# Patient Record
Sex: MALE | Race: WHITE | NOT HISPANIC OR LATINO | Employment: STUDENT | URBAN - METROPOLITAN AREA
[De-identification: names, ages, dates, MRNs, and addresses within clinical notes are randomized per-mention and may not be internally consistent; named-entity substitution may affect disease eponyms.]

---
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
## 2021-01-07 ENCOUNTER — TELEPHONE (OUTPATIENT)
Dept: GASTROENTEROLOGY | Facility: CLINIC | Age: 21
End: 2021-01-07

## 2021-01-07 NOTE — TELEPHONE ENCOUNTER
----- Message from Tong Aly MD sent at 1/4/2021  8:46 PM EST -----  01/04/21  Tell him that his urinalysis came back normal. Let's see what the Urologist says about his kidney stone seen on the CT abd/pelvis. We'll also see what the Gastric emptying study and Kinevac Hida scan show? FAX this to his PCP and the Urologist that he is to see.   Thx. kjh   [facility]  Dr. Tsosie Hodgkins Progress Note     Subjective:   Edna Mendes is a(n) 77year old female.    Current complaints: Patient presents with:  Rheumatoid Arthritis: 3mo f/u, RAPID 3 high severity, labs 7/12/18 CA 8.7, Pt States she has been feeling diazepam 5 mg tablets as a muscle relaxer 3 times a day. Discontinue methotrexate due to liver function abnormalities. Hopefully the Norco with the diazepam can control your condition.   Since you are on Eliquis you cannot take nonsteroidal anti-inflammat

## 2021-01-07 NOTE — TELEPHONE ENCOUNTER
Call from pt.  Advise per DR Aly note.  Verb understanding.     \[facility]\"" has seen urologist and been advised he had a kidney stone.  \[facility]\"" urologist says this is causing him a little pain, but further GI testing should be done.     Pt has GES and HIDA scheduled.     Update to Dr Aly.

## 2021-01-18 ENCOUNTER — HOSPITAL ENCOUNTER (OUTPATIENT)
Dept: NUCLEAR MEDICINE | Facility: HOSPITAL | Age: 21
Discharge: HOME | End: 2021-01-18

## 2021-01-18 DIAGNOSIS — R10.10 PAIN OF UPPER ABDOMEN: ICD-10-CM

## 2021-01-18 DIAGNOSIS — R10.9 ABDOMINAL CRAMPING: ICD-10-CM

## 2021-01-18 PROCEDURE — 0 TECHNETIUM TC 99M MEBROFENIN KIT: Performed by: INTERNAL MEDICINE

## 2021-01-18 PROCEDURE — A9537 TC99M MEBROFENIN: HCPCS | Performed by: INTERNAL MEDICINE

## 2021-01-18 PROCEDURE — 25010000002 SINCALIDE PER 5 MCG: Performed by: INTERNAL MEDICINE

## 2021-01-18 PROCEDURE — 78227 HEPATOBIL SYST IMAGE W/DRUG: CPT

## 2021-01-18 RX ORDER — KIT FOR THE PREPARATION OF TECHNETIUM TC 99M MEBROFENIN 45 MG/10ML
1 INJECTION, POWDER, LYOPHILIZED, FOR SOLUTION INTRAVENOUS
Status: COMPLETED | OUTPATIENT
Start: 2021-01-18 | End: 2021-01-18

## 2021-01-18 RX ADMIN — MEBROFENIN 1 DOSE: 45 INJECTION, POWDER, LYOPHILIZED, FOR SOLUTION INTRAVENOUS at 07:25

## 2021-01-18 RX ADMIN — SINCALIDE 1.2 MCG: 5 INJECTION, POWDER, LYOPHILIZED, FOR SOLUTION INTRAVENOUS at 08:35

## 2021-01-21 ENCOUNTER — TELEPHONE (OUTPATIENT)
Dept: GASTROENTEROLOGY | Facility: CLINIC | Age: 21
End: 2021-01-21

## 2021-01-21 NOTE — TELEPHONE ENCOUNTER
----- Message from Tong Aly MD sent at 1/20/2021  7:25 PM EST -----  01/20/21  Tell him that his Kinevac HIDA scan came back normal with a gallbladder ejection fraction of 69% which is normal.  Was a gastric emptying study done?  Please fax a copy of this report to his PCP.  Kassandra. kjh

## 2021-01-21 NOTE — TELEPHONE ENCOUNTER
Called pt and advised of Dr Aly's note. Verb understanding. Pt did reports that his ges is scheduled for 02/16/2021.  Update sent to Dr Aly.     Results sent to Dr Carballo thru [facility].

## 2021-01-21 NOTE — PROGRESS NOTES
01/20/21  Tell him that his Kinevac HIDA scan came back normal with a gallbladder ejection fraction of 69% which is normal.  Was a gastric emptying study done?  Please fax a copy of this report to his PCP.  Becca olea

## 2021-02-16 ENCOUNTER — HOSPITAL ENCOUNTER (OUTPATIENT)
Dept: NUCLEAR MEDICINE | Facility: HOSPITAL | Age: 21
End: 2021-02-16

## 2021-03-03 ENCOUNTER — HOSPITAL ENCOUNTER (EMERGENCY)
Facility: HOSPITAL | Age: 21
Discharge: HOME | End: 2021-03-03
Attending: EMERGENCY MEDICINE | Admitting: EMERGENCY MEDICINE

## 2021-03-03 ENCOUNTER — APPOINTMENT (OUTPATIENT)
Dept: GENERAL RADIOLOGY | Facility: HOSPITAL | Age: 21
End: 2021-03-03

## 2021-03-03 VITALS
WEIGHT: 135 LBS | HEART RATE: 74 BPM | HEIGHT: 71 IN | SYSTOLIC BLOOD PRESSURE: 127 MMHG | TEMPERATURE: 97.8 F | RESPIRATION RATE: 18 BRPM | OXYGEN SATURATION: 98 % | DIASTOLIC BLOOD PRESSURE: 59 MMHG | BODY MASS INDEX: 18.9 KG/M2

## 2021-03-03 DIAGNOSIS — F41.1 ANXIETY REACTION: Primary | ICD-10-CM

## 2021-03-03 LAB
ALBUMIN SERPL-MCNC: 4.9 G/DL (ref 3.5–5.2)
ALBUMIN/GLOB SERPL: 1.9 G/DL
ALP SERPL-CCNC: 162 U/L (ref 39–117)
ALT SERPL W P-5'-P-CCNC: 14 U/L (ref 1–41)
ANION GAP SERPL CALCULATED.3IONS-SCNC: 12.9 MMOL/L (ref 5–15)
AST SERPL-CCNC: 16 U/L (ref 1–40)
BASOPHILS # BLD AUTO: 0.02 10*3/MM3 (ref 0–0.2)
BASOPHILS NFR BLD AUTO: 0.3 % (ref 0–1.5)
BILIRUB SERPL-MCNC: 0.6 MG/DL (ref 0–1.2)
BUN SERPL-MCNC: 14 MG/DL (ref 6–20)
BUN/CREAT SERPL: 20.9 (ref 7–25)
CALCIUM SPEC-SCNC: 9.6 MG/DL (ref 8.6–10.5)
CHLORIDE SERPL-SCNC: 103 MMOL/L (ref 98–107)
CO2 SERPL-SCNC: 24.1 MMOL/L (ref 22–29)
CREAT SERPL-MCNC: 0.67 MG/DL (ref 0.76–1.27)
DEPRECATED RDW RBC AUTO: 41.6 FL (ref 37–54)
EOSINOPHIL # BLD AUTO: 0.01 10*3/MM3 (ref 0–0.4)
EOSINOPHIL NFR BLD AUTO: 0.1 % (ref 0.3–6.2)
ERYTHROCYTE [DISTWIDTH] IN BLOOD BY AUTOMATED COUNT: 12.9 % (ref 12.3–15.4)
GFR SERPL CREATININE-BSD FRML MDRD: >150 ML/MIN/1.73
GLOBULIN UR ELPH-MCNC: 2.6 GM/DL
GLUCOSE SERPL-MCNC: 93 MG/DL (ref 65–99)
HCT VFR BLD AUTO: 46.4 % (ref 37.5–51)
HGB BLD-MCNC: 16.3 G/DL (ref 13–17.7)
IMM GRANULOCYTES # BLD AUTO: 0.03 10*3/MM3 (ref 0–0.05)
IMM GRANULOCYTES NFR BLD AUTO: 0.4 % (ref 0–0.5)
LIPASE SERPL-CCNC: 27 U/L (ref 13–60)
LYMPHOCYTES # BLD AUTO: 1.94 10*3/MM3 (ref 0.7–3.1)
LYMPHOCYTES NFR BLD AUTO: 28.8 % (ref 19.6–45.3)
MCH RBC QN AUTO: 31.7 PG (ref 26.6–33)
MCHC RBC AUTO-ENTMCNC: 35.1 G/DL (ref 31.5–35.7)
MCV RBC AUTO: 90.3 FL (ref 79–97)
MONOCYTES # BLD AUTO: 0.45 10*3/MM3 (ref 0.1–0.9)
MONOCYTES NFR BLD AUTO: 6.7 % (ref 5–12)
NEUTROPHILS NFR BLD AUTO: 4.29 10*3/MM3 (ref 1.7–7)
NEUTROPHILS NFR BLD AUTO: 63.7 % (ref 42.7–76)
NRBC BLD AUTO-RTO: 0 /100 WBC (ref 0–0.2)
PLATELET # BLD AUTO: 213 10*3/MM3 (ref 140–450)
PMV BLD AUTO: 11.3 FL (ref 6–12)
POTASSIUM SERPL-SCNC: 3.8 MMOL/L (ref 3.5–5.2)
PROT SERPL-MCNC: 7.5 G/DL (ref 6–8.5)
QT INTERVAL: 336 MS
RBC # BLD AUTO: 5.14 10*6/MM3 (ref 4.14–5.8)
SODIUM SERPL-SCNC: 140 MMOL/L (ref 136–145)
TROPONIN T SERPL-MCNC: <0.01 NG/ML (ref 0–0.03)
WBC # BLD AUTO: 6.74 10*3/MM3 (ref 3.4–10.8)

## 2021-03-03 PROCEDURE — 85025 COMPLETE CBC W/AUTO DIFF WBC: CPT | Performed by: NURSE PRACTITIONER

## 2021-03-03 PROCEDURE — 25010000002 LORAZEPAM PER 2 MG: Performed by: NURSE PRACTITIONER

## 2021-03-03 PROCEDURE — 83690 ASSAY OF LIPASE: CPT | Performed by: NURSE PRACTITIONER

## 2021-03-03 PROCEDURE — 84484 ASSAY OF TROPONIN QUANT: CPT | Performed by: NURSE PRACTITIONER

## 2021-03-03 PROCEDURE — 71045 X-RAY EXAM CHEST 1 VIEW: CPT

## 2021-03-03 PROCEDURE — 99284 EMERGENCY DEPT VISIT MOD MDM: CPT

## 2021-03-03 PROCEDURE — 96374 THER/PROPH/DIAG INJ IV PUSH: CPT

## 2021-03-03 PROCEDURE — 96375 TX/PRO/DX INJ NEW DRUG ADDON: CPT

## 2021-03-03 PROCEDURE — 93010 ELECTROCARDIOGRAM REPORT: CPT | Performed by: INTERNAL MEDICINE

## 2021-03-03 PROCEDURE — 80053 COMPREHEN METABOLIC PANEL: CPT | Performed by: NURSE PRACTITIONER

## 2021-03-03 PROCEDURE — 93005 ELECTROCARDIOGRAM TRACING: CPT | Performed by: NURSE PRACTITIONER

## 2021-03-03 PROCEDURE — 25010000002 KETOROLAC TROMETHAMINE PER 15 MG: Performed by: NURSE PRACTITIONER

## 2021-03-03 RX ORDER — SODIUM CHLORIDE 0.9 % (FLUSH) 0.9 %
10 SYRINGE (ML) INJECTION AS NEEDED
Status: DISCONTINUED | OUTPATIENT
Start: 2021-03-03 | End: 2021-03-03 | Stop reason: HOSPADM

## 2021-03-03 RX ORDER — LORAZEPAM 2 MG/ML
1 INJECTION INTRAMUSCULAR ONCE
Status: COMPLETED | OUTPATIENT
Start: 2021-03-03 | End: 2021-03-03

## 2021-03-03 RX ORDER — KETOROLAC TROMETHAMINE 15 MG/ML
10 INJECTION, SOLUTION INTRAMUSCULAR; INTRAVENOUS ONCE
Status: COMPLETED | OUTPATIENT
Start: 2021-03-03 | End: 2021-03-03

## 2021-03-03 RX ADMIN — LORAZEPAM 1 MG: 2 INJECTION INTRAMUSCULAR; INTRAVENOUS at 18:29

## 2021-03-03 RX ADMIN — KETOROLAC TROMETHAMINE 10 MG: 15 INJECTION, SOLUTION INTRAMUSCULAR; INTRAVENOUS at 19:25

## 2021-03-03 RX ADMIN — SODIUM CHLORIDE 500 ML: 9 INJECTION, SOLUTION INTRAVENOUS at 18:27

## 2021-03-03 NOTE — ED PROVIDER NOTES
EMERGENCY DEPARTMENT ENCOUNTER    Room Number:  08/08  Date seen:  3/3/2021  Time seen: 17:55 EST  PCP: Abdullahi Carballo MD  Historian: Patient    HPI:  Chief complaint: Anxiety attack   A complete HPI/ROS/PMH/PSH/SH/FH are unobtainable due to: Not applicable  Context:Wilber Hopkins is a 20 y.o. male who presents to the ED with c/o moderate to severe anxiety attack which occurred prior to arrival right after he got home from [place] today.  He has associated bilateral hand tingling, chest pain described as moderate and stabbing and associated abdominal pain again described as intermittent, mild and stabbing.  He states the symptoms are similar to prior anxiety attack for which he usually takes hydroxyzine 25 mg.  He took hydroxyzine earlier today and states that it did not help today.  He states increased stressors in life including death of an aunt, school stress, and history of being bullied.  He denies any suicidal ideations or homicidal ideations.   He denies any illicit substance abuse or alcohol.  He has had these problems before.    Patient was placed in face mask in first look. Patient was wearing facemask when I entered the room and throughout our encounter. I wore full protective equipment throughout this patient encounter including a face mask, eye shield and gloves. Hand hygiene/washing of hands was performed before donning protective equipment and after removal when leaving the room.    MEDICAL RECORD REVIEW    ALLERGIES  Amoxicillin and Penicillins    PAST MEDICAL HISTORY  Active Ambulatory Problems     Diagnosis Date Noted   • Pain of upper abdomen 07/08/2020   • Diarrhea 07/08/2020   • Constipation 07/08/2020   • Nausea and vomiting 07/08/2020   • Gastroesophageal reflux disease 07/08/2020     Resolved Ambulatory Problems     Diagnosis Date Noted   • No Resolved Ambulatory Problems     Past Medical History:   Diagnosis Date   • Anxiety    • Gastritis    • GERD (gastroesophageal reflux disease)    •  OCD (obsessive compulsive disorder)    • Panic attack    • PTSD (post-traumatic stress disorder)        PAST SURGICAL HISTORY  Past Surgical History:   Procedure Laterality Date   • COLONOSCOPY N/A 7/18/2020    Procedure: COLONOSCOPY to cecum and t.i. with biopsies;  Surgeon: Tong Aly MD;  Location: [facility] ENDOSCOPY;  Service: Gastroenterology;  Laterality: N/A;  pre-  abd pain, n/v, diarrhea  post- normal   • ENDOSCOPY N/A 7/18/2020    Procedure: ESOPHAGOGASTRODUODENOSCOPY with biopsies;  Surgeon: Tong Aly MD;  Location: [facility] ENDOSCOPY;  Service: Gastroenterology;  Laterality: N/A;  pre- abd pain, n/v, diarrhea  post- gastritis       FAMILY HISTORY  No family history on file.    SOCIAL HISTORY  Social History     Socioeconomic History   • Marital status: Single     Spouse name: Not on file   • Number of children: Not on file   • Years of education: Not on file   • Highest education level: Not on file   Tobacco Use   • Smoking status: Never Smoker   • Smokeless tobacco: Never Used   Substance and Sexual Activity   • Alcohol use: No     Frequency: Never   • Drug use: No   • Sexual activity: Defer       REVIEW OF SYSTEMS  Review of Systems    All systems reviewed and negative except for those discussed in HPI.     PHYSICAL EXAM    ED Triage Vitals [03/03/21 1722]   Temp Heart Rate Resp BP SpO2   97.8 °F (36.6 °C) 93 16 -- 95 %      Temp src Heart Rate Source Patient Position BP Location FiO2 (%)   Tympanic Monitor -- -- --     Physical Exam    I have reviewed the triage vital signs and nursing notes.      GENERAL: very anxious, trembling  HENT: nares patent, mm moist  EYES: no scleral icterus  NECK: no ROM limitations  CV: regular rhythm, sinus tachycardia, no murmur, no rubs, no gallups  RESPIRATORY: normal effort, CTAB  ABDOMEN: soft, no focal tenderness, no guarding or rebound  : deferred  MUSCULOSKELETAL: no deformity  NEURO: alert, moves all extremities, follows commands  SKIN: warm, dry    LAB  RESULTS  Recent Results (from the past 24 hour(s))   ECG 12 Lead    Collection Time: 03/03/21  6:20 PM   Result Value Ref Range    QT Interval 336 ms   Comprehensive Metabolic Panel    Collection Time: 03/03/21  6:28 PM    Specimen: Blood   Result Value Ref Range    Glucose 93 65 - 99 mg/dL    BUN 14 6 - 20 mg/dL    Creatinine 0.67 (L) 0.76 - 1.27 mg/dL    Sodium 140 136 - 145 mmol/L    Potassium 3.8 3.5 - 5.2 mmol/L    Chloride 103 98 - 107 mmol/L    CO2 24.1 22.0 - 29.0 mmol/L    Calcium 9.6 8.6 - 10.5 mg/dL    Total Protein 7.5 6.0 - 8.5 g/dL    Albumin 4.90 3.50 - 5.20 g/dL    ALT (SGPT) 14 1 - 41 U/L    AST (SGOT) 16 1 - 40 U/L    Alkaline Phosphatase 162 (H) 39 - 117 U/L    Total Bilirubin 0.6 0.0 - 1.2 mg/dL    eGFR Non African Amer >150 >60 mL/min/1.73    Globulin 2.6 gm/dL    A/G Ratio 1.9 g/dL    BUN/Creatinine Ratio 20.9 7.0 - 25.0    Anion Gap 12.9 5.0 - 15.0 mmol/L   Lipase    Collection Time: 03/03/21  6:28 PM    Specimen: Blood   Result Value Ref Range    Lipase 27 13 - 60 U/L   CBC Auto Differential    Collection Time: 03/03/21  6:28 PM    Specimen: Blood   Result Value Ref Range    WBC 6.74 3.40 - 10.80 10*3/mm3    RBC 5.14 4.14 - 5.80 10*6/mm3    Hemoglobin 16.3 13.0 - 17.7 g/dL    Hematocrit 46.4 37.5 - 51.0 %    MCV 90.3 79.0 - 97.0 fL    MCH 31.7 26.6 - 33.0 pg    MCHC 35.1 31.5 - 35.7 g/dL    RDW 12.9 12.3 - 15.4 %    RDW-SD 41.6 37.0 - 54.0 fl    MPV 11.3 6.0 - 12.0 fL    Platelets 213 140 - 450 10*3/mm3    Neutrophil % 63.7 42.7 - 76.0 %    Lymphocyte % 28.8 19.6 - 45.3 %    Monocyte % 6.7 5.0 - 12.0 %    Eosinophil % 0.1 (L) 0.3 - 6.2 %    Basophil % 0.3 0.0 - 1.5 %    Immature Grans % 0.4 0.0 - 0.5 %    Neutrophils, Absolute 4.29 1.70 - 7.00 10*3/mm3    Lymphocytes, Absolute 1.94 0.70 - 3.10 10*3/mm3    Monocytes, Absolute 0.45 0.10 - 0.90 10*3/mm3    Eosinophils, Absolute 0.01 0.00 - 0.40 10*3/mm3    Basophils, Absolute 0.02 0.00 - 0.20 10*3/mm3    Immature Grans, Absolute 0.03 0.00 - 0.05  10*3/mm3    nRBC 0.0 0.0 - 0.2 /100 WBC   Troponin    Collection Time: 03/03/21  6:28 PM    Specimen: Blood   Result Value Ref Range    Troponin T <0.010 0.000 - 0.030 ng/mL         RADIOLOGY RESULTS  XR Chest 1 View               PROGRESS, DATA ANALYSIS, CONSULTS AND MEDICAL DECISION MAKING  All labs have been independently reviewed by me.  All radiology studies have been reviewed by me and discussed with radiologist dictating the report.  EKG's independently viewed and interpreted by me unless stated otherwise. Discussion below represents my analysis of pertinent findings related to patient's condition, differential diagnosis, treatment plan and final disposition.     ED Course as of Mar 03 2336   Wed Mar 03, 2021   1838 EKG   viewed by ER MD prior to my interpretation       EKG time:1820  Rhythm/Rate:107, sinus tachycardia  P waves and CT: normal CT, normal NEHEMIAH  QRS, axis: normal qRS, normal axis  ST and T waves: borderline T wave abnormalities    Interpreted Contemporaneously by me, independently viewed  No prior available for comparison      [EW]   2007 I viewed chest x-ray on PACS.  My interpretation is no acute abnormalities.    [EW]      ED Course User Index  [EW] Brodie [place], LOLIS     DDX: anxiety reaction, CP, abdominal pain    MDM: labs, EKG and troponin are normal.  His anxiety and tremulousness is improved after Ativan and his chest pain is completely gone after Toradol.  No evidence of ACS, pericarditis, myocarditis, pulmonary embolism, pneumothorax, pneumonia, Zoster, or esophageal perforation. Historically not abrupt in onset, tearing or ripping, pulses symmetric, no evidence of aortic dissection.     Reviewed pt's history and workup with Dr. Jones.  After a bedside evaluation, Dr. Jones agrees with the plan of care.    The patient's history, physical exam, and lab findings were discussed with the physician, who also performed a face to face history and physical exam.  I discussed all  "results and noted any abnormalities with patient.  Discussed absoute need to recheck abnormalities with their family physician.  I answered any of the patient's questions.  Discussed plan for discharge, as there is no emergent indication for admission.  Pt is agreeable and understands need for follow up and repeat testing.  Pt is aware that discharge does not mean that nothing is wrong but it indicates no emergency is present and they must continue care with their family physician.  Pt is discharged with instructions to follow up with primary care doctor to have their blood pressure rechecked.       Disposition vitals:  /59 (BP Location: Left arm, Patient Position: Lying)   Pulse 74   Temp 97.8 °F (36.6 °C) (Tympanic)   Resp 18   Ht 180.3 cm (71\")   Wt 61.2 kg (135 lb)   SpO2 98%   BMI 18.83 kg/m²       DIAGNOSIS  Final diagnoses:   Anxiety reaction       FOLLOW UP   Abdullahi Carballo MD  [address] [id number]  [phone]    Schedule an appointment as soon as possible for a visit in 1 day           Nydia Calloway, LOLIS  03/03/21 [id number]    "

## 2021-03-03 NOTE — ED NOTES
Patient was placed in face mask in first look.  Patient was wearing a face mask throughout our encounter.  I wore protective eye protection throughout the encounter.  Hand hygiene was performed before and after patient encounter.       Patient c/o panic attack, abdominal pain, and chest pain about 30 minutes PTA.  Pt has a Hx of Anxiety.     Galo Carvajal, RN  03/03/21 1472

## 2021-03-04 ENCOUNTER — OFFICE VISIT (OUTPATIENT)
Dept: GASTROENTEROLOGY | Facility: CLINIC | Age: 21
End: 2021-03-04

## 2021-03-04 VITALS — WEIGHT: 132.4 LBS | HEIGHT: 71 IN | TEMPERATURE: 98 F | BODY MASS INDEX: 18.54 KG/M2

## 2021-03-04 DIAGNOSIS — R10.10 PAIN OF UPPER ABDOMEN: Primary | ICD-10-CM

## 2021-03-04 DIAGNOSIS — K21.9 GASTROESOPHAGEAL REFLUX DISEASE WITHOUT ESOPHAGITIS: ICD-10-CM

## 2021-03-04 DIAGNOSIS — R19.8 PAIN ASSOCIATED WITH DEFECATION: ICD-10-CM

## 2021-03-04 PROCEDURE — 99214 OFFICE O/P EST MOD 30 MIN: CPT | Performed by: NURSE PRACTITIONER

## 2021-03-04 NOTE — PROGRESS NOTES
Chief Complaint   Patient presents with   • Follow-up   • Diarrhea     Resolved   • Heartburn   • Constipation     Pain with BMs. OTC meds 0% effective       Wilber Hopkins is a  20 y.o. male here for a follow up visit for abdominal pain.    HPI  20-year-old male presents today for follow-up visit for upper abdominal pain.  He is a patient of Dr. Aly.  He was last seen in the office on 12/30/2020.  Yesterday he was in the [facility] ER for a nasty panic attack.  Cardiac work-up was negative.  He tells me school has been quite stressful lately.  He is currently on spring break.  He has had an extensive GI work-up including unremarkable EGD and colonoscopy last year, H. pylori breath test that was negative, small bowel follow-through that was normal, gallbladder ultrasound which was normal and most recently a HIDA scan which was normal.  Patient has yet to get the gastric emptying study done.  He does have a history of GERD and admits as long as he takes the omeprazole 20 mg daily he does well.  He does admit his anxiety makes all of his symptoms worse.  He is occasionally having to take the bentyl for the upper abdominal pain and cramping.  He admits the Bentyl works well for that.  He still reports several times a week having pain with a bowel movement.  Sometimes his stools can be harder than others.  He does have 1-2 bowel movements every day.  He tells me he drinks plenty of water.  He denies any dysphagia, nausea and vomiting, diarrhea, rectal bleeding or melena.  Admits his appetite is good and his weight is stable.  Past Medical History:   Diagnosis Date   • Anxiety    • Gastritis    • GERD (gastroesophageal reflux disease)    • OCD (obsessive compulsive disorder)    • Panic attack    • PTSD (post-traumatic stress disorder)        Past Surgical History:   Procedure Laterality Date   • COLONOSCOPY N/A 7/18/2020    Procedure: COLONOSCOPY to cecum and t.i. with biopsies;  Surgeon: Tong Aly MD;  Location:   [facility] ENDOSCOPY;  Service: Gastroenterology;  Laterality: N/A;  pre-  abd pain, n/v, diarrhea  post- normal   • ENDOSCOPY N/A 7/18/2020    Procedure: ESOPHAGOGASTRODUODENOSCOPY with biopsies;  Surgeon: Tong Aly MD;  Location: [facility] ENDOSCOPY;  Service: Gastroenterology;  Laterality: N/A;  pre- abd pain, n/v, diarrhea  post- gastritis       Scheduled Meds:    Continuous Infusions:No current facility-administered medications for this visit.       PRN Meds:.    Allergies   Allergen Reactions   • Amoxicillin GI Intolerance   • Penicillins GI Intolerance       Social History     Socioeconomic History   • Marital status: Single     Spouse name: Not on file   • Number of children: Not on file   • Years of education: Not on file   • Highest education level: Not on file   Tobacco Use   • Smoking status: Never Smoker   • Smokeless tobacco: Never Used   Substance and Sexual Activity   • Alcohol use: No     Frequency: Never   • Drug use: No   • Sexual activity: Defer       History reviewed. No pertinent family history.    Review of Systems   Constitutional: Negative for appetite change, chills, diaphoresis, fatigue, fever and unexpected weight change.   HENT: Negative for nosebleeds, postnasal drip, sore throat, trouble swallowing and voice change.    Respiratory: Negative for cough, choking, chest tightness, shortness of breath and wheezing.    Cardiovascular: Negative for chest pain, palpitations and leg swelling.   Gastrointestinal: Negative for abdominal distention, abdominal pain, anal bleeding, blood in stool, constipation, diarrhea, nausea, rectal pain and vomiting.   Endocrine: Negative for polydipsia, polyphagia and polyuria.   Musculoskeletal: Negative for gait problem.   Skin: Negative for rash and wound.   Allergic/Immunologic: Negative for food allergies.   Neurological: Negative for dizziness, speech difficulty and light-headedness.   Psychiatric/Behavioral: Negative for confusion, self-injury, sleep  disturbance and suicidal ideas.       Vitals:    03/04/21 1131   Temp: 98 °F (36.7 °C)       Physical Exam  Constitutional:       General: He is not in acute distress.     Appearance: He is well-developed. He is not ill-appearing.   HENT:      Head: Normocephalic.   Eyes:      Pupils: Pupils are equal, round, and reactive to light.   Cardiovascular:      Rate and Rhythm: Normal rate and regular rhythm.      Heart sounds: Normal heart sounds.   Pulmonary:      Effort: Pulmonary effort is normal.      Breath sounds: Normal breath sounds.   Abdominal:      General: Bowel sounds are normal. There is no distension.      Palpations: Abdomen is soft. There is no mass.      Tenderness: There is no abdominal tenderness. There is no guarding or rebound.      Hernia: No hernia is present.   Musculoskeletal: Normal range of motion.   Skin:     General: Skin is warm and dry.   Neurological:      Mental Status: He is alert and oriented to person, place, and time.   Psychiatric:         Speech: Speech normal.         Behavior: Behavior normal.         Judgment: Judgment normal.         No radiology results for the last 7 days     Assessment and plan    1. Pain of upper abdomen    2. Gastroesophageal reflux disease without esophagitis    3. Pain associated with defecation        Reviewed ER records and imaging records with him today.  HIDA scan was normal.  Small bowel follow-through was negative.  Gallbladder ultrasound was normal.  H. pylori breath test was negative.  Sounds like his anxiety attacks really seem to make all of his GI symptoms much worse.  GERD seems well controlled right now on omeprazole 20 mg daily.  Bentyl seems to help with the abdominal pain and cramping.  He still is reporting pain with having a bowel movement.  I think he needs to increase his water and fiber in his diet.  Patient still needs to get a gastric emptying study done.  He is in a call scheduling today and see about getting on the schedule for  that.  Patient to call the office with any issues.  Patient to follow-up with Dr. Aly as planned.

## 2021-03-04 NOTE — DISCHARGE INSTRUCTIONS
Talk to your Primary Care Provider or Therapist about an antidepressant that may help with your anxiety    Also, you can take 1-2 of the 25 mg Hydroxyzine tablets as needed for anxiety    Although you are being discharged from the ED today, I encourage you to return for worsening symptoms. Things can, and do, change such that treatment at home with medication may not be adequate. Specifically I recommend returning for chest pain or discomfort, difficulty breathing, persistent vomiting or difficulty holding down liquids or medications, fever > 102.0 F, or any other worsening or alarming symptoms.     Rest. Drink plenty of fluids.  Follow up with PCP or provider listed for further evaluation and management.  Follow up with primary care provider for further management and to have blood pressure rechecked.  Take all medications as prescribed.

## 2021-03-18 ENCOUNTER — HOSPITAL ENCOUNTER (EMERGENCY)
Facility: HOSPITAL | Age: 21
Discharge: HOME | End: 2021-03-18
Attending: EMERGENCY MEDICINE | Admitting: EMERGENCY MEDICINE

## 2021-03-18 ENCOUNTER — HOSPITAL ENCOUNTER (EMERGENCY)
Facility: HOSPITAL | Age: 21
End: 2021-03-18

## 2021-03-18 VITALS
RESPIRATION RATE: 16 BRPM | HEART RATE: 76 BPM | OXYGEN SATURATION: 94 % | DIASTOLIC BLOOD PRESSURE: 71 MMHG | TEMPERATURE: 98 F | SYSTOLIC BLOOD PRESSURE: 119 MMHG

## 2021-03-18 DIAGNOSIS — F41.0 ANXIETY ATTACK: Primary | ICD-10-CM

## 2021-03-18 PROCEDURE — 99284 EMERGENCY DEPT VISIT MOD MDM: CPT

## 2021-03-18 PROCEDURE — 93010 ELECTROCARDIOGRAM REPORT: CPT | Performed by: INTERNAL MEDICINE

## 2021-03-18 PROCEDURE — 99283 EMERGENCY DEPT VISIT LOW MDM: CPT

## 2021-03-18 PROCEDURE — 93005 ELECTROCARDIOGRAM TRACING: CPT

## 2021-03-18 RX ORDER — ACETAMINOPHEN 500 MG
1000 TABLET ORAL ONCE
Status: COMPLETED | OUTPATIENT
Start: 2021-03-18 | End: 2021-03-18

## 2021-03-18 RX ADMIN — ACETAMINOPHEN 1000 MG: 500 TABLET, FILM COATED ORAL at 21:26

## 2021-03-18 NOTE — ED TRIAGE NOTES
Pt was brought in by ems for chest pain and anxiety.     Pt wearing mask on arrival. Staff wearing mask and goggles at time of triage.

## 2021-03-19 LAB — QT INTERVAL: 313 MS

## 2021-03-19 NOTE — ED PROVIDER NOTES
" EMERGENCY DEPARTMENT ENCOUNTER    Room Number:  22/22  Date of encounter:  3/18/2021  PCP: Abdullahi Carballo MD  Historian: Patient     I used full protective equipment while examining this patient.  This includes face mask, gloves and protective eyewear.  I washed my hands before entering the room and immediately upon leaving the room      HPI:  Chief Complaint: \"Panic attack\"  A complete HPI/ROS/PMH/PSH/SH/FH are unobtainable due to: None    Context: Wilber Hopkins is a 21 y.o. male who presents to the ED c/o \"panic attack\".  Patient was on his bike tonight around 7:00 when he developed a panic attack.  He developed chest pain and upper abdominal pain.  He became very short of breath and had difficulty calming down.  Symptoms lasted for about 1 hour and he was calm down when EMS got there and helped him relax.  He is not given any medications and now is feeling somewhat better although still has some upper chest pain which is described as aching.  This pain is very typical of prior panic attacks.  He was seen in our ED several weeks ago with the same and had negative work-up.  He has been seen multiple times for same.  He generally takes hydroxyzine when he gets panic attack but is never been on long-term medication to help with generalized anxiety.  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  He denies any suicidal ideation.  He denies any illegal drug use.      MEDICAL RECORD REVIEW  I reviewed prior medical records and note the patient was seen in our ED on 3/3/2021 for chest pain and anxiety.  He had a fairly benign work-up including normal EKG x-ray and labs.    PAST MEDICAL HISTORY  Active Ambulatory Problems     Diagnosis Date Noted   • Pain of upper abdomen 07/08/2020   • Diarrhea 07/08/2020   • Constipation 07/08/2020   • Nausea and vomiting 07/08/2020   • Gastroesophageal reflux disease 07/08/2020     Resolved Ambulatory Problems     Diagnosis Date Noted   • " No Resolved Ambulatory Problems     Past Medical History:   Diagnosis Date   • Anxiety    • Gastritis    • GERD (gastroesophageal reflux disease)    • OCD (obsessive compulsive disorder)    • Panic attack    • PTSD (post-traumatic stress disorder)          PAST SURGICAL HISTORY  Past Surgical History:   Procedure Laterality Date   • COLONOSCOPY N/A 7/18/2020    Procedure: COLONOSCOPY to cecum and t.i. with biopsies;  Surgeon: Tong Aly MD;  Location: [facility] ENDOSCOPY;  Service: Gastroenterology;  Laterality: N/A;  pre-  abd pain, n/v, diarrhea  post- normal   • ENDOSCOPY N/A 7/18/2020    Procedure: ESOPHAGOGASTRODUODENOSCOPY with biopsies;  Surgeon: Tong Aly MD;  Location: [facility] ENDOSCOPY;  Service: Gastroenterology;  Laterality: N/A;  pre- abd pain, n/v, diarrhea  post- gastritis         FAMILY HISTORY  No family history on file.      SOCIAL HISTORY  Social History     Socioeconomic History   • Marital status: Single     Spouse name: Not on file   • Number of children: Not on file   • Years of education: Not on file   • Highest education level: Not on file   Tobacco Use   • Smoking status: Never Smoker   • Smokeless tobacco: Never Used   Vaping Use   • Vaping Use: Never used   Substance and Sexual Activity   • Alcohol use: No   • Drug use: No   • Sexual activity: Defer         ALLERGIES  Amoxicillin and Penicillins       REVIEW OF SYSTEMS  Review of Systems   Constitutional: Negative.  Negative for fever.   HENT: Negative.  Negative for sore throat.    Eyes: Negative.    Respiratory: Negative.  Negative for cough.    Cardiovascular: Positive for chest pain (As per HPI).   Gastrointestinal: Positive for abdominal pain (Chronic upper abdominal pain).   Genitourinary: Negative.  Negative for dysuria.   Musculoskeletal: Negative.  Negative for back pain.   Skin: Negative.  Negative for rash.   Neurological: Negative.  Negative for headaches.   Psychiatric/Behavioral: Negative for self-injury. The patient is  nervous/anxious.    All other systems reviewed and are negative.          PHYSICAL EXAM    I have reviewed the triage vital signs and nursing notes.    ED Triage Vitals [03/18/21 1906]   Temp Heart Rate Resp BP SpO2   98 °F (36.7 °C) 103 28 134/86 98 %      Temp src Heart Rate Source Patient Position BP Location FiO2 (%)   -- Monitor -- -- --       Physical Exam  GENERAL: Alert male in no obvious distress.  Vitals reviewed notable for mildly elevated heart rate of 103.  HENT: nares patent  EYES: no scleral icterus  CV: regular rhythm, regular rate-no murmur  RESPIRATORY: normal effort, clear to auscultation bilaterally  ABDOMEN: soft, nontender to palpation  MUSCULOSKELETAL: no deformity-no swelling or tenderness to palpation  NEURO: Strength, sensation, and coordination are grossly intact.  Speech and mentation are unremarkable  SKIN: warm, dry      LAB RESULTS  Recent Results (from the past 24 hour(s))   ECG 12 Lead    Collection Time: 03/18/21  7:36 PM   Result Value Ref Range    QT Interval 313 ms       Ordered the above labs and independently reviewed the results.      RADIOLOGY  No Radiology Exams Resulted Within Past 24 Hours    I ordered the above noted radiological studies. Reviewed by me and discussed with radiologist.  See dictation for official radiology interpretation.      PROCEDURES  Procedures      MEDICATIONS GIVEN IN ER    Medications   acetaminophen (TYLENOL) tablet 1,000 mg (has no administration in time range)         PROGRESS, DATA ANALYSIS, CONSULTS, AND MEDICAL DECISION MAKING    All labs have been independently reviewed by me.  All radiology studies have been reviewed by me and discussed with radiologist dictating the report.   EKG's independently viewed and interpreted by me.  Discussion below represents my analysis of pertinent findings related to patient's condition, differential diagnosis, treatment plan and final disposition.      ED Course as of Mar 18 2125   Thu Mar 18, 2021   2116  DAE-14-rvkx-old male with history of panic attacks and prior ED visits for same presents with similar episode tonight.  I think we are dealing again with recurrence of panic attack but will get EKG for further assessment.    [DB]   2117 EKG          EKG time: 1936  Rhythm/Rate: Sinus 95 with occasional PACs  P waves and PA: Normal P waves and PA intervals  QRS, axis: Normal axis, normal QRS  ST and T waves: Nonspecific ST and T wave changes    Interpreted Contemporaneously by me, independently viewed  Not significantly changed from 3/3/2021      [DB]   2118 MDM-I spoke with patient at length about the severity of these panic attacks and I really encouraged him to follow-up with his primary care provider, Dr. Markos espinosa and discussed the severity of these episodes.  He has never been on long-term antianxiety medication such as Paxil or Zoloft and I do believe that these may be helpful.  However there are risks of suicidality in young patients and this should really be discussed with his primary care provider.  I did discuss having a behavioral health assessment here in the ED the patient states he does not want a wait to be seen by that as he does have a therapist that he already sees as an outpatient.  We will go ahead and give Tylenol 1 g to help with pain related to anxiety attack.    [DB]      ED Course User Index  [DB] Darrick Munoz MD       AS OF 21:25 EDT VITALS:    BP - 115/62  HR - 103  TEMP - 98 °F (36.7 °C)  O2 SATS - 98%      DIAGNOSIS  Final diagnoses:   Anxiety attack         DISPOSITION  DISCHARGE    Patient discharged in stable condition.    Reviewed implications of results, diagnosis, meds, responsibility to follow up, warning signs and symptoms of possible worsening, potential complications and reasons to return to ER, including increased chest pain, shortness of breath or as needed.    Patient/Family voiced understanding of above instructions.    Discussed plan for discharge, as there is no  emergent indication for admission. Patient referred to primary care provider for BP management due to today's BP. Pt/family is agreeable and understands need for follow up and repeat testing.  Pt is aware that discharge does not mean that nothing is wrong but it indicates no emergency is present that requires admission and they must continue care with follow-up as given below or physician of their choice.     FOLLOW-UP  Abdullahi Carballo MD  [address] [id number] [phone]      Call for Appointment         Medication List      No changes were made to your prescriptions during this visit.                Darrick Munoz MD  03/18/21 5684

## 2021-03-19 NOTE — DISCHARGE INSTRUCTIONS
I do heartily recommend discussing these episodes with your primary care provider.  There are a number of medications that may be useful, however there are side effects with all medications so this should really be discussed with your primary care provider.

## 2021-05-26 ENCOUNTER — TELEPHONE (OUTPATIENT)
Dept: GASTROENTEROLOGY | Facility: CLINIC | Age: 21
End: 2021-05-26

## 2021-05-26 RX ORDER — DICYCLOMINE HCL 20 MG
20 TABLET ORAL 3 TIMES DAILY PRN
Qty: 90 TABLET | Refills: 0 | OUTPATIENT
Start: 2021-05-26 | End: 2021-06-16

## 2021-05-26 NOTE — TELEPHONE ENCOUNTER
----- Message from Hortensia Arthur sent at 5/26/2021 10:42 AM EDT -----  Regarding: med refill  Contact: [phone]  Pt is requesting med refill      dicyclomine (BENTYL) 20 MG tablet 90 tablet 0 12/23/2020    Sig - Route: Take 1 tablet by mouth 3 (Three) Times a Day As Needed (abd pain or cramping). - Oral   Sent to pharmacy as: Dicyclomine HCl 20 MG Oral Tablet (BENTYL)   E-Prescribing Status: Receipt confirmed by pharmacy (12/23/2020  4:23 PM EST)   Pharmacy    [facility] [address] [place] [id number] [facility] [phone] [facility] [phone]

## 2021-06-15 ENCOUNTER — APPOINTMENT (OUTPATIENT)
Dept: GENERAL RADIOLOGY | Facility: HOSPITAL | Age: 21
End: 2021-06-15

## 2021-06-15 ENCOUNTER — HOSPITAL ENCOUNTER (EMERGENCY)
Facility: HOSPITAL | Age: 21
Discharge: HOME | End: 2021-06-15
Attending: EMERGENCY MEDICINE | Admitting: EMERGENCY MEDICINE

## 2021-06-15 ENCOUNTER — APPOINTMENT (OUTPATIENT)
Dept: CT IMAGING | Facility: HOSPITAL | Age: 21
End: 2021-06-15

## 2021-06-15 VITALS
TEMPERATURE: 97.3 F | RESPIRATION RATE: 16 BRPM | HEART RATE: 80 BPM | OXYGEN SATURATION: 95 % | SYSTOLIC BLOOD PRESSURE: 150 MMHG | DIASTOLIC BLOOD PRESSURE: 76 MMHG

## 2021-06-15 DIAGNOSIS — F41.9 ANXIETY: Primary | ICD-10-CM

## 2021-06-15 DIAGNOSIS — R11.2 NAUSEA AND VOMITING IN ADULT: ICD-10-CM

## 2021-06-15 DIAGNOSIS — R10.84 GENERALIZED ABDOMINAL PAIN: ICD-10-CM

## 2021-06-15 LAB
ALBUMIN SERPL-MCNC: 4.8 G/DL (ref 3.5–5.2)
ALBUMIN/GLOB SERPL: 2.1 G/DL
ALP SERPL-CCNC: 128 U/L (ref 39–117)
ALT SERPL W P-5'-P-CCNC: 21 U/L (ref 1–41)
AMPHET+METHAMPHET UR QL: NEGATIVE
ANION GAP SERPL CALCULATED.3IONS-SCNC: 10.3 MMOL/L (ref 5–15)
AST SERPL-CCNC: 28 U/L (ref 1–40)
BARBITURATES UR QL SCN: NEGATIVE
BASOPHILS # BLD AUTO: 0.03 10*3/MM3 (ref 0–0.2)
BASOPHILS NFR BLD AUTO: 0.6 % (ref 0–1.5)
BENZODIAZ UR QL SCN: NEGATIVE
BILIRUB SERPL-MCNC: 0.5 MG/DL (ref 0–1.2)
BILIRUB UR QL STRIP: NEGATIVE
BUN SERPL-MCNC: 16 MG/DL (ref 6–20)
BUN/CREAT SERPL: 21.9 (ref 7–25)
CALCIUM SPEC-SCNC: 9.2 MG/DL (ref 8.6–10.5)
CANNABINOIDS SERPL QL: NEGATIVE
CHLORIDE SERPL-SCNC: 107 MMOL/L (ref 98–107)
CLARITY UR: CLEAR
CO2 SERPL-SCNC: 26.7 MMOL/L (ref 22–29)
COCAINE UR QL: NEGATIVE
COLOR UR: YELLOW
CREAT SERPL-MCNC: 0.73 MG/DL (ref 0.76–1.27)
DEPRECATED RDW RBC AUTO: 42.1 FL (ref 37–54)
EOSINOPHIL # BLD AUTO: 0.03 10*3/MM3 (ref 0–0.4)
EOSINOPHIL NFR BLD AUTO: 0.6 % (ref 0.3–6.2)
ERYTHROCYTE [DISTWIDTH] IN BLOOD BY AUTOMATED COUNT: 12.5 % (ref 12.3–15.4)
ETHANOL BLD-MCNC: <10 MG/DL (ref 0–10)
ETHANOL UR QL: <0.01 %
GFR SERPL CREATININE-BSD FRML MDRD: 137 ML/MIN/1.73
GLOBULIN UR ELPH-MCNC: 2.3 GM/DL
GLUCOSE SERPL-MCNC: 93 MG/DL (ref 65–99)
GLUCOSE UR STRIP-MCNC: NEGATIVE MG/DL
HCT VFR BLD AUTO: 47 % (ref 37.5–51)
HGB BLD-MCNC: 16.7 G/DL (ref 13–17.7)
HGB UR QL STRIP.AUTO: NEGATIVE
HOLD SPECIMEN: NORMAL
HOLD SPECIMEN: NORMAL
IMM GRANULOCYTES # BLD AUTO: 0.02 10*3/MM3 (ref 0–0.05)
IMM GRANULOCYTES NFR BLD AUTO: 0.4 % (ref 0–0.5)
KETONES UR QL STRIP: ABNORMAL
LEUKOCYTE ESTERASE UR QL STRIP.AUTO: NEGATIVE
LIPASE SERPL-CCNC: 25 U/L (ref 13–60)
LYMPHOCYTES # BLD AUTO: 1.33 10*3/MM3 (ref 0.7–3.1)
LYMPHOCYTES NFR BLD AUTO: 26.1 % (ref 19.6–45.3)
MCH RBC QN AUTO: 32.8 PG (ref 26.6–33)
MCHC RBC AUTO-ENTMCNC: 35.5 G/DL (ref 31.5–35.7)
MCV RBC AUTO: 92.3 FL (ref 79–97)
METHADONE UR QL SCN: NEGATIVE
MONOCYTES # BLD AUTO: 0.61 10*3/MM3 (ref 0.1–0.9)
MONOCYTES NFR BLD AUTO: 12 % (ref 5–12)
NEUTROPHILS NFR BLD AUTO: 3.07 10*3/MM3 (ref 1.7–7)
NEUTROPHILS NFR BLD AUTO: 60.3 % (ref 42.7–76)
NITRITE UR QL STRIP: NEGATIVE
NRBC BLD AUTO-RTO: 0 /100 WBC (ref 0–0.2)
OPIATES UR QL: NEGATIVE
OXYCODONE UR QL SCN: NEGATIVE
PH UR STRIP.AUTO: 6.5 [PH] (ref 5–8)
PLATELET # BLD AUTO: 170 10*3/MM3 (ref 140–450)
PMV BLD AUTO: 11.7 FL (ref 6–12)
POTASSIUM SERPL-SCNC: 4.1 MMOL/L (ref 3.5–5.2)
PROT SERPL-MCNC: 7.1 G/DL (ref 6–8.5)
PROT UR QL STRIP: NEGATIVE
QT INTERVAL: 361 MS
RBC # BLD AUTO: 5.09 10*6/MM3 (ref 4.14–5.8)
SODIUM SERPL-SCNC: 144 MMOL/L (ref 136–145)
SP GR UR STRIP: 1.03 (ref 1–1.03)
UROBILINOGEN UR QL STRIP: ABNORMAL
WBC # BLD AUTO: 5.09 10*3/MM3 (ref 3.4–10.8)
WHOLE BLOOD HOLD SPECIMEN: NORMAL

## 2021-06-15 PROCEDURE — 93010 ELECTROCARDIOGRAM REPORT: CPT | Performed by: INTERNAL MEDICINE

## 2021-06-15 PROCEDURE — 80307 DRUG TEST PRSMV CHEM ANLYZR: CPT | Performed by: NURSE PRACTITIONER

## 2021-06-15 PROCEDURE — 85025 COMPLETE CBC W/AUTO DIFF WBC: CPT | Performed by: NURSE PRACTITIONER

## 2021-06-15 PROCEDURE — 93005 ELECTROCARDIOGRAM TRACING: CPT | Performed by: NURSE PRACTITIONER

## 2021-06-15 PROCEDURE — 74177 CT ABD & PELVIS W/CONTRAST: CPT

## 2021-06-15 PROCEDURE — 25010000002 LORAZEPAM PER 2 MG: Performed by: NURSE PRACTITIONER

## 2021-06-15 PROCEDURE — 99283 EMERGENCY DEPT VISIT LOW MDM: CPT

## 2021-06-15 PROCEDURE — 83690 ASSAY OF LIPASE: CPT | Performed by: NURSE PRACTITIONER

## 2021-06-15 PROCEDURE — 96375 TX/PRO/DX INJ NEW DRUG ADDON: CPT

## 2021-06-15 PROCEDURE — 25010000002 IOPAMIDOL 61 % SOLUTION: Performed by: EMERGENCY MEDICINE

## 2021-06-15 PROCEDURE — 81003 URINALYSIS AUTO W/O SCOPE: CPT | Performed by: NURSE PRACTITIONER

## 2021-06-15 PROCEDURE — 96374 THER/PROPH/DIAG INJ IV PUSH: CPT

## 2021-06-15 PROCEDURE — 25010000002 ONDANSETRON PER 1 MG: Performed by: NURSE PRACTITIONER

## 2021-06-15 PROCEDURE — 71045 X-RAY EXAM CHEST 1 VIEW: CPT

## 2021-06-15 PROCEDURE — 80053 COMPREHEN METABOLIC PANEL: CPT | Performed by: NURSE PRACTITIONER

## 2021-06-15 PROCEDURE — 82077 ASSAY SPEC XCP UR&BREATH IA: CPT | Performed by: NURSE PRACTITIONER

## 2021-06-15 RX ORDER — ONDANSETRON 2 MG/ML
4 INJECTION INTRAMUSCULAR; INTRAVENOUS ONCE
Status: COMPLETED | OUTPATIENT
Start: 2021-06-15 | End: 2021-06-15

## 2021-06-15 RX ORDER — LORAZEPAM 2 MG/ML
1 INJECTION INTRAMUSCULAR ONCE
Status: COMPLETED | OUTPATIENT
Start: 2021-06-15 | End: 2021-06-15

## 2021-06-15 RX ORDER — ONDANSETRON 4 MG/1
4 TABLET, ORALLY DISINTEGRATING ORAL EVERY 8 HOURS PRN
Qty: 10 TABLET | Refills: 0 | Status: SHIPPED | OUTPATIENT
Start: 2021-06-15 | End: 2022-05-23

## 2021-06-15 RX ADMIN — ONDANSETRON 4 MG: 2 INJECTION INTRAMUSCULAR; INTRAVENOUS at 12:31

## 2021-06-15 RX ADMIN — LORAZEPAM 1 MG: 2 INJECTION INTRAMUSCULAR; INTRAVENOUS at 09:19

## 2021-06-15 RX ADMIN — IOPAMIDOL 85 ML: 612 INJECTION, SOLUTION INTRAVENOUS at 11:14

## 2021-06-15 NOTE — ED TRIAGE NOTES
abd pain and cp started yest aft.  Has had nausea.  No vd    Patient was placed in face mask during first look triage.  Patient was wearing a face mask throughout encounter.  I wore personal protective equipment throughout the encounter.  Hand hygiene was performed before and after patient encounter.

## 2021-06-15 NOTE — DISCHARGE INSTRUCTIONS
Continue hydroxyzine.  Take Zofran as needed for nausea.  Follow-up with your GI doctor.  Follow up With your therapist.  Return for worsening symptoms or new concerns.  Continue care with your primary care physician and have your blood pressure regularly checked and managed. Normal blood pressure is 120/80.

## 2021-07-25 ENCOUNTER — HOSPITAL ENCOUNTER (EMERGENCY)
Facility: HOSPITAL | Age: 21
Discharge: HOME | End: 2021-07-25
Attending: EMERGENCY MEDICINE | Admitting: EMERGENCY MEDICINE

## 2021-07-25 VITALS
SYSTOLIC BLOOD PRESSURE: 112 MMHG | DIASTOLIC BLOOD PRESSURE: 67 MMHG | RESPIRATION RATE: 17 BRPM | WEIGHT: 130 LBS | BODY MASS INDEX: 18.2 KG/M2 | OXYGEN SATURATION: 100 % | TEMPERATURE: 98.4 F | HEART RATE: 58 BPM | HEIGHT: 71 IN

## 2021-07-25 DIAGNOSIS — F41.0 PANIC ATTACK: ICD-10-CM

## 2021-07-25 DIAGNOSIS — J02.8 PHARYNGITIS DUE TO OTHER ORGANISM: Primary | ICD-10-CM

## 2021-07-25 LAB
ANION GAP SERPL CALCULATED.3IONS-SCNC: 9.1 MMOL/L (ref 5–15)
BASOPHILS # BLD AUTO: 0.02 10*3/MM3 (ref 0–0.2)
BASOPHILS NFR BLD AUTO: 0.3 % (ref 0–1.5)
BUN SERPL-MCNC: 13 MG/DL (ref 6–20)
BUN/CREAT SERPL: 14.9 (ref 7–25)
CALCIUM SPEC-SCNC: 9.3 MG/DL (ref 8.6–10.5)
CHLORIDE SERPL-SCNC: 105 MMOL/L (ref 98–107)
CO2 SERPL-SCNC: 27.9 MMOL/L (ref 22–29)
CREAT SERPL-MCNC: 0.87 MG/DL (ref 0.76–1.27)
DEPRECATED RDW RBC AUTO: 42.8 FL (ref 37–54)
EOSINOPHIL # BLD AUTO: 0.04 10*3/MM3 (ref 0–0.4)
EOSINOPHIL NFR BLD AUTO: 0.6 % (ref 0.3–6.2)
ERYTHROCYTE [DISTWIDTH] IN BLOOD BY AUTOMATED COUNT: 12.9 % (ref 12.3–15.4)
GFR SERPL CREATININE-BSD FRML MDRD: 112 ML/MIN/1.73
GLUCOSE SERPL-MCNC: 94 MG/DL (ref 65–99)
HCT VFR BLD AUTO: 43.2 % (ref 37.5–51)
HGB BLD-MCNC: 14.8 G/DL (ref 13–17.7)
LYMPHOCYTES # BLD AUTO: 1.18 10*3/MM3 (ref 0.7–3.1)
LYMPHOCYTES NFR BLD AUTO: 18 % (ref 19.6–45.3)
MCH RBC QN AUTO: 31.5 PG (ref 26.6–33)
MCHC RBC AUTO-ENTMCNC: 34.3 G/DL (ref 31.5–35.7)
MCV RBC AUTO: 91.9 FL (ref 79–97)
MONOCYTES # BLD AUTO: 0.58 10*3/MM3 (ref 0.1–0.9)
MONOCYTES NFR BLD AUTO: 8.9 % (ref 5–12)
NEUTROPHILS NFR BLD AUTO: 4.7 10*3/MM3 (ref 1.7–7)
NEUTROPHILS NFR BLD AUTO: 71.9 % (ref 42.7–76)
PLATELET # BLD AUTO: 131 10*3/MM3 (ref 140–450)
PMV BLD AUTO: 11.6 FL (ref 6–12)
POTASSIUM SERPL-SCNC: 4.1 MMOL/L (ref 3.5–5.2)
RBC # BLD AUTO: 4.7 10*6/MM3 (ref 4.14–5.8)
S PYO AG THROAT QL: NEGATIVE
SODIUM SERPL-SCNC: 142 MMOL/L (ref 136–145)
WBC # BLD AUTO: 6.54 10*3/MM3 (ref 3.4–10.8)

## 2021-07-25 PROCEDURE — 87880 STREP A ASSAY W/OPTIC: CPT | Performed by: NURSE PRACTITIONER

## 2021-07-25 PROCEDURE — 80048 BASIC METABOLIC PNL TOTAL CA: CPT | Performed by: NURSE PRACTITIONER

## 2021-07-25 PROCEDURE — 63710000001 ONDANSETRON ODT 4 MG TABLET DISPERSIBLE: Performed by: NURSE PRACTITIONER

## 2021-07-25 PROCEDURE — 87081 CULTURE SCREEN ONLY: CPT | Performed by: NURSE PRACTITIONER

## 2021-07-25 PROCEDURE — 25010000002 DEXAMETHASONE SODIUM PHOSPHATE 10 MG/ML SOLUTION: Performed by: NURSE PRACTITIONER

## 2021-07-25 PROCEDURE — 99283 EMERGENCY DEPT VISIT LOW MDM: CPT

## 2021-07-25 PROCEDURE — 85025 COMPLETE CBC W/AUTO DIFF WBC: CPT | Performed by: NURSE PRACTITIONER

## 2021-07-25 PROCEDURE — 36415 COLL VENOUS BLD VENIPUNCTURE: CPT

## 2021-07-25 PROCEDURE — 96374 THER/PROPH/DIAG INJ IV PUSH: CPT

## 2021-07-25 RX ORDER — ONDANSETRON 4 MG/1
4 TABLET, ORALLY DISINTEGRATING ORAL ONCE
Status: COMPLETED | OUTPATIENT
Start: 2021-07-25 | End: 2021-07-25

## 2021-07-25 RX ORDER — DEXAMETHASONE SODIUM PHOSPHATE 10 MG/ML
10 INJECTION, SOLUTION INTRAMUSCULAR; INTRAVENOUS ONCE
Status: COMPLETED | OUTPATIENT
Start: 2021-07-25 | End: 2021-07-25

## 2021-07-25 RX ORDER — CEPHALEXIN 500 MG/1
500 CAPSULE ORAL 4 TIMES DAILY
Qty: 28 CAPSULE | Refills: 0 | Status: SHIPPED | OUTPATIENT
Start: 2021-07-25 | End: 2022-03-31 | Stop reason: HOSPADM

## 2021-07-25 RX ADMIN — DEXAMETHASONE SODIUM PHOSPHATE 10 MG: 10 INJECTION, SOLUTION INTRAMUSCULAR; INTRAVENOUS at 12:30

## 2021-07-25 RX ADMIN — ONDANSETRON 4 MG: 4 TABLET, ORALLY DISINTEGRATING ORAL at 11:19

## 2021-07-25 NOTE — ED PROVIDER NOTES
Brief history of present illness: 21-year-old male with severe anxiety reports that he had a significant anxiety attack yesterday and feels sore in his chest and abdomen since then with no associated vomiting or diarrhea.  Today he does also complain of some sore throat that is new for him.  Hurts to swallow but he is able to swallow saliva.  No voice changes reported.  No fevers.  Patient specifically denies any recent exposures to STD oral intercourse recently.    Physical exam:     ED Triage Vitals   Temp Heart Rate Resp BP SpO2   07/25/21 0941 07/25/21 0941 07/25/21 0941 07/25/21 1000 07/25/21 0941   98.4 °F (36.9 °C) 64 16 111/84 96 %      Temp src Heart Rate Source Patient Position BP Location FiO2 (%)   -- 07/25/21 1004 07/25/21 1004 07/25/21 1004 --    Monitor Sitting Right arm      Alert appropriate.  Not overtly toxic.  No meningismus or rigidity.  Neck supple.  Posterior pharyngeal erythema without any tonsillar exudate or hypertrophy noted.  Mucous membranes moist.  Pink warm and well-perfused.  No tachypnea or increased work of breathing.  Abdomen is soft though diffusely sore with palpation.  No rebound or rigidity appreciated.  Moves all extremities equally.    MDM:    Results for orders placed or performed during the hospital encounter of 07/25/21   Rapid Strep A Screen - Swab, Throat    Specimen: Throat; Swab   Result Value Ref Range    Strep A Ag Negative Negative   Basic Metabolic Panel    Specimen: Blood   Result Value Ref Range    Glucose 94 65 - 99 mg/dL    BUN 13 6 - 20 mg/dL    Creatinine 0.87 0.76 - 1.27 mg/dL    Sodium 142 136 - 145 mmol/L    Potassium 4.1 3.5 - 5.2 mmol/L    Chloride 105 98 - 107 mmol/L    CO2 27.9 22.0 - 29.0 mmol/L    Calcium 9.3 8.6 - 10.5 mg/dL    eGFR Non African Amer 112 >60 mL/min/1.73    BUN/Creatinine Ratio 14.9 7.0 - 25.0    Anion Gap 9.1 5.0 - 15.0 mmol/L   CBC Auto Differential    Specimen: Blood   Result Value Ref Range    WBC 6.54 3.40 - 10.80 10*3/mm3    RBC  4.70 4.14 - 5.80 10*6/mm3    Hemoglobin 14.8 13.0 - 17.7 g/dL    Hematocrit 43.2 37.5 - 51.0 %    MCV 91.9 79.0 - 97.0 fL    MCH 31.5 26.6 - 33.0 pg    MCHC 34.3 31.5 - 35.7 g/dL    RDW 12.9 12.3 - 15.4 %    RDW-SD 42.8 37.0 - 54.0 fl    MPV 11.6 6.0 - 12.0 fL    Platelets 131 (L) 140 - 450 10*3/mm3    Neutrophil % 71.9 42.7 - 76.0 %    Lymphocyte % 18.0 (L) 19.6 - 45.3 %    Monocyte % 8.9 5.0 - 12.0 %    Eosinophil % 0.6 0.3 - 6.2 %    Basophil % 0.3 0.0 - 1.5 %    Neutrophils, Absolute 4.70 1.70 - 7.00 10*3/mm3    Lymphocytes, Absolute 1.18 0.70 - 3.10 10*3/mm3    Monocytes, Absolute 0.58 0.10 - 0.90 10*3/mm3    Eosinophils, Absolute 0.04 0.00 - 0.40 10*3/mm3    Basophils, Absolute 0.02 0.00 - 0.20 10*3/mm3     Agree with plan for discharge and outpatient follow-up.    I have seen and personally evaluated this patient, discussed the case with the treating advanced practice provider, and reviewed their note. I was involved in the medical decision making during the evaluation, testing and disposition planning for this patient.     Wilmer Mosley MD  07/25/21 2247

## 2021-07-25 NOTE — ED NOTES
Patient from home with c/o sore throat, abdominal pain and chest pain. Sore throat started Friday. Yesterday he started having abdominal pain and chest pain after a panic attack.      Amna Velasquez RN  07/25/21 8406

## 2021-07-25 NOTE — ED PROVIDER NOTES
EMERGENCY DEPARTMENT ENCOUNTER    Room Number:  08/08  Date of encounter:  7/25/2021  PCP: Abdullahi Carballo MD  Historian: Patient      PPE    Patient was placed in face mask in first look. Patient was wearing facemask when I entered the room and throughout our encounter. I wore full protective equipment throughout this patient encounter including a face mask, and gloves. Hand hygiene was performed before donning protective equipment and after removal when leaving the room.        HPI:  Chief Complaint: Sore throat  A complete HPI/ROS/PMH/PSH/SH/FH are unobtainable due to: Nothing    Context: Wilber Hopkins is a 21 y.o. male who arrives to the ED via private vehicle.  Patient presents with c/o mild, constant sore throat that began Friday.   Patient also complains of pain with swallowing.  Patient states that he also had a panic attack last night which he states is what is causing his chest and abdomen pain.  He states he does have panic attacks recurrently and these symptoms usually occur afterwards.  Patient denies fever, chills, cough, shortness of breath, dizziness or any other symptoms.  Patient states that nothing makes the symptoms better and swallowing worsens symptoms.          PAST MEDICAL HISTORY  Active Ambulatory Problems     Diagnosis Date Noted   • Pain of upper abdomen 07/08/2020   • Diarrhea 07/08/2020   • Constipation 07/08/2020   • Nausea and vomiting 07/08/2020   • Gastroesophageal reflux disease 07/08/2020     Resolved Ambulatory Problems     Diagnosis Date Noted   • No Resolved Ambulatory Problems     Past Medical History:   Diagnosis Date   • Anxiety    • Gastritis    • GERD (gastroesophageal reflux disease)    • OCD (obsessive compulsive disorder)    • Panic attack    • PTSD (post-traumatic stress disorder)          PAST SURGICAL HISTORY  Past Surgical History:   Procedure Laterality Date   • COLONOSCOPY N/A 7/18/2020    Procedure: COLONOSCOPY to cecum and t.i. with biopsies;  Surgeon:  Tong Aly MD;  Location: [facility] ENDOSCOPY;  Service: Gastroenterology;  Laterality: N/A;  pre-  abd pain, n/v, diarrhea  post- normal   • ENDOSCOPY N/A 7/18/2020    Procedure: ESOPHAGOGASTRODUODENOSCOPY with biopsies;  Surgeon: Tong Aly MD;  Location: [facility] ENDOSCOPY;  Service: Gastroenterology;  Laterality: N/A;  pre- abd pain, n/v, diarrhea  post- gastritis         FAMILY HISTORY  History reviewed. No pertinent family history.      SOCIAL HISTORY  Social History     Socioeconomic History   • Marital status: Single     Spouse name: Not on file   • Number of children: Not on file   • Years of education: Not on file   • Highest education level: Not on file   Tobacco Use   • Smoking status: Never Smoker   • Smokeless tobacco: Never Used   Vaping Use   • Vaping Use: Never used   Substance and Sexual Activity   • Alcohol use: No   • Drug use: No   • Sexual activity: Defer         ALLERGIES  Amoxicillin and Penicillins        REVIEW OF SYSTEMS  Review of Systems     All systems reviewed and negative except for those discussed in HPI.        PHYSICAL EXAM    ED Triage Vitals   Temp Heart Rate Resp BP SpO2   07/25/21 0941 07/25/21 0941 07/25/21 0941 07/25/21 1004 07/25/21 0941   98.4 °F (36.9 °C) 64 16 111/84 96 %       Physical Exam  HENT:      Right Ear: Tympanic membrane and ear canal normal.      Left Ear: Tympanic membrane and ear canal normal.      Mouth/Throat:      Mouth: Mucous membranes are moist.      Pharynx: Posterior oropharyngeal erythema present.       GENERAL: Well appearing, non-toxic appearing, not distressed, speaking in full sentences handling his secretions  HENT: normocephalic, atraumatic    EYES: no scleral icterus, PERRL  CV: regular rhythm, regular rate, no murmur  RESPIRATORY: normal effort, CTAB  ABDOMEN: soft, normal bowel sounds, nontender  MUSCULOSKELETAL: no deformity  NEURO: alert, moves all extremities, follows commands, mental status normal/baseline  SKIN: warm, dry, no rash    Psych: Appropriate mood and affect  Nursing notes and vital signs reviewed      LAB RESULTS  Recent Results (from the past 24 hour(s))   Basic Metabolic Panel    Collection Time: 07/25/21 10:21 AM    Specimen: Blood   Result Value Ref Range    Glucose 94 65 - 99 mg/dL    BUN 13 6 - 20 mg/dL    Creatinine 0.87 0.76 - 1.27 mg/dL    Sodium 142 136 - 145 mmol/L    Potassium 4.1 3.5 - 5.2 mmol/L    Chloride 105 98 - 107 mmol/L    CO2 27.9 22.0 - 29.0 mmol/L    Calcium 9.3 8.6 - 10.5 mg/dL    eGFR Non African Amer 112 >60 mL/min/1.73    BUN/Creatinine Ratio 14.9 7.0 - 25.0    Anion Gap 9.1 5.0 - 15.0 mmol/L   Rapid Strep A Screen - Swab, Throat    Collection Time: 07/25/21 10:21 AM    Specimen: Throat; Swab   Result Value Ref Range    Strep A Ag Negative Negative   CBC Auto Differential    Collection Time: 07/25/21 10:21 AM    Specimen: Blood   Result Value Ref Range    WBC 6.54 3.40 - 10.80 10*3/mm3    RBC 4.70 4.14 - 5.80 10*6/mm3    Hemoglobin 14.8 13.0 - 17.7 g/dL    Hematocrit 43.2 37.5 - 51.0 %    MCV 91.9 79.0 - 97.0 fL    MCH 31.5 26.6 - 33.0 pg    MCHC 34.3 31.5 - 35.7 g/dL    RDW 12.9 12.3 - 15.4 %    RDW-SD 42.8 37.0 - 54.0 fl    MPV 11.6 6.0 - 12.0 fL    Platelets 131 (L) 140 - 450 10*3/mm3    Neutrophil % 71.9 42.7 - 76.0 %    Lymphocyte % 18.0 (L) 19.6 - 45.3 %    Monocyte % 8.9 5.0 - 12.0 %    Eosinophil % 0.6 0.3 - 6.2 %    Basophil % 0.3 0.0 - 1.5 %    Neutrophils, Absolute 4.70 1.70 - 7.00 10*3/mm3    Lymphocytes, Absolute 1.18 0.70 - 3.10 10*3/mm3    Monocytes, Absolute 0.58 0.10 - 0.90 10*3/mm3    Eosinophils, Absolute 0.04 0.00 - 0.40 10*3/mm3    Basophils, Absolute 0.02 0.00 - 0.20 10*3/mm3       Ordered the above labs and independently reviewed the results.      RADIOLOGY  No Radiology Exams Resulted Within Past 24 Hours    I ordered the above noted radiological studies and viewed the images on the PACS system.       MEDICAL RECORD REVIEW  Medical records reviewed in epic, patient was last seen  here in June for anxiety and chest pain.      PROCEDURES    Procedures        DIFFERENTIAL DIAGNOSIS  Differential Diagnosis for Sore Throat include bu are not limited to the following:    Viral Pharyngitis, Strep Pharyngitis, Mononucleosis, Epiglottis, Peritonsillar abscess, Retropharyngeal abscess, Tonsillitis, Viral Illness        PROGRESS, DATA ANALYSIS, CONSULTS, AND MEDICAL DECISION MAKING        ED Course as of Jul 25 1232   Sun Jul 25, 2021   1041 Discussed pertinent information from history and physical exam with patient.  Discussed differential diagnosis and plan for ED evaluation/work-up and treatment including labs rapid strep screen.  All questions answered.  Patient is agreeable with this plan.        [MS]   1224 Reviewed pt's history and workup with Dr. Mosley.  After a bedside evaluation, they agree with the plan of care.          [MS]   1228 Patient was updated on unremarkable work-up done here today specifically negative strep screen.  Patient's throat is pretty erythematous and he is having pain with swallowing, I will write him a prescription for antibiotics however he was instructed if he is feeling better by tomorrow he does not necessarily need to take them.  Patient was given strict return to ER precautions and close follow-up with his PMD.  Patient verbalized understanding and is agreeable to this plan.    [MS]      ED Course User Index  [MS] Earline Werner APRN     Discussed plan for discharge, as there is no emergent indication for admission. Pt/family is agreeable and understands need for follow up and repeat testing.  Pt is aware that discharge does not mean that nothing is wrong but it indicates no emergency is present that requires admission and they must continue care with follow-up as given below or physician of their choice.   Patient/Family voiced understanding of above instructions.  Patient discharged in stable condition.    DIAGNOSIS  Final diagnoses:   Pharyngitis due  to other organism   Panic attack       FOLLOW UP   Abdullahi Carballo MD  [address] [place] [id number]  [phone]    Schedule an appointment as soon as possible for a visit in 1 week  If symptoms worsen      RX     Medication List      New Prescriptions    cephalexin 500 MG capsule  Commonly known as: KEFLEX  Take 1 capsule by mouth 4 (Four) Times a Day.           Where to Get Your Medications      You can get these medications from any pharmacy    Bring a paper prescription for each of these medications  · cephalexin 500 MG capsule           MEDICATIONS GIVEN IN ED    Medications   ondansetron ODT (ZOFRAN-ODT) disintegrating tablet 4 mg (4 mg Oral Given 7/25/21 1119)   dexamethasone sodium phosphate injection 10 mg (10 mg Intravenous Given 7/25/21 1230)           COURSE & MEDICAL DECISION MAKING  Any/All labs and Any/All Imaging studies that were ordered were reviewed and are noted above.  Results were reviewed/discussed with the patient and they were also made aware of online access.    Pt also made aware that some labs, such as cultures, will not be resulted during ER visit and follow up with PMD is necessary.        Earline Werner, APRN  07/25/21 1233

## 2021-07-25 NOTE — DISCHARGE INSTRUCTIONS
If you are feeling better in the a.m. tomorrow you will not need to get the prescription for the antibiotic filled.  Warm salt water gargles  Tylenol or ibuprofen as needed for pain or fever  Follow-up with your PMD in 5 to 7 days if symptoms not improving  Return to the ER for fever, chills, chest pain, shortness of breath, inability to swallow, worsening anxiety or panic attack.

## 2021-07-27 LAB — BACTERIA SPEC AEROBE CULT: NORMAL

## 2022-03-30 ENCOUNTER — HOSPITAL ENCOUNTER (EMERGENCY)
Facility: HOSPITAL | Age: 22
Discharge: HOME | End: 2022-03-30
Attending: EMERGENCY MEDICINE | Admitting: EMERGENCY MEDICINE

## 2022-03-30 ENCOUNTER — APPOINTMENT (OUTPATIENT)
Dept: CT IMAGING | Facility: HOSPITAL | Age: 22
End: 2022-03-30

## 2022-03-30 VITALS
HEIGHT: 71 IN | RESPIRATION RATE: 18 BRPM | WEIGHT: 135 LBS | HEART RATE: 90 BPM | TEMPERATURE: 97.7 F | OXYGEN SATURATION: 98 % | DIASTOLIC BLOOD PRESSURE: 89 MMHG | SYSTOLIC BLOOD PRESSURE: 134 MMHG | BODY MASS INDEX: 18.9 KG/M2

## 2022-03-30 DIAGNOSIS — R10.9 CHRONIC ABDOMINAL PAIN: Primary | ICD-10-CM

## 2022-03-30 DIAGNOSIS — R51.9 CHRONIC NONINTRACTABLE HEADACHE, UNSPECIFIED HEADACHE TYPE: ICD-10-CM

## 2022-03-30 DIAGNOSIS — G89.29 CHRONIC NONINTRACTABLE HEADACHE, UNSPECIFIED HEADACHE TYPE: ICD-10-CM

## 2022-03-30 DIAGNOSIS — G89.29 CHRONIC ABDOMINAL PAIN: Primary | ICD-10-CM

## 2022-03-30 LAB
ALBUMIN SERPL-MCNC: 4.6 G/DL (ref 3.5–5.2)
ALBUMIN/GLOB SERPL: 1.9 G/DL
ALP SERPL-CCNC: 97 U/L (ref 39–117)
ALT SERPL W P-5'-P-CCNC: 13 U/L (ref 1–41)
AMORPH URATE CRY URNS QL MICRO: ABNORMAL /HPF
ANION GAP SERPL CALCULATED.3IONS-SCNC: 8 MMOL/L (ref 5–15)
AST SERPL-CCNC: 15 U/L (ref 1–40)
BACTERIA UR QL AUTO: ABNORMAL /HPF
BASOPHILS # BLD AUTO: 0.02 10*3/MM3 (ref 0–0.2)
BASOPHILS NFR BLD AUTO: 0.5 % (ref 0–1.5)
BILIRUB SERPL-MCNC: 0.7 MG/DL (ref 0–1.2)
BILIRUB UR QL STRIP: NEGATIVE
BUN SERPL-MCNC: 15 MG/DL (ref 6–20)
BUN/CREAT SERPL: 17.9 (ref 7–25)
CALCIUM SPEC-SCNC: 9.3 MG/DL (ref 8.6–10.5)
CHLORIDE SERPL-SCNC: 103 MMOL/L (ref 98–107)
CLARITY UR: ABNORMAL
CO2 SERPL-SCNC: 27 MMOL/L (ref 22–29)
COLOR UR: YELLOW
CREAT SERPL-MCNC: 0.84 MG/DL (ref 0.76–1.27)
DEPRECATED RDW RBC AUTO: 40.1 FL (ref 37–54)
EGFRCR SERPLBLD CKD-EPI 2021: 127.2 ML/MIN/1.73
EOSINOPHIL # BLD AUTO: 0.03 10*3/MM3 (ref 0–0.4)
EOSINOPHIL NFR BLD AUTO: 0.7 % (ref 0.3–6.2)
ERYTHROCYTE [DISTWIDTH] IN BLOOD BY AUTOMATED COUNT: 12.2 % (ref 12.3–15.4)
GLOBULIN UR ELPH-MCNC: 2.4 GM/DL
GLUCOSE SERPL-MCNC: 88 MG/DL (ref 65–99)
GLUCOSE UR STRIP-MCNC: NEGATIVE MG/DL
HCT VFR BLD AUTO: 46.4 % (ref 37.5–51)
HGB BLD-MCNC: 16.4 G/DL (ref 13–17.7)
HGB UR QL STRIP.AUTO: NEGATIVE
HYALINE CASTS UR QL AUTO: ABNORMAL /LPF
IMM GRANULOCYTES # BLD AUTO: 0.01 10*3/MM3 (ref 0–0.05)
IMM GRANULOCYTES NFR BLD AUTO: 0.2 % (ref 0–0.5)
KETONES UR QL STRIP: NEGATIVE
LEUKOCYTE ESTERASE UR QL STRIP.AUTO: ABNORMAL
LIPASE SERPL-CCNC: 24 U/L (ref 13–60)
LYMPHOCYTES # BLD AUTO: 1.46 10*3/MM3 (ref 0.7–3.1)
LYMPHOCYTES NFR BLD AUTO: 33.2 % (ref 19.6–45.3)
MCH RBC QN AUTO: 32.3 PG (ref 26.6–33)
MCHC RBC AUTO-ENTMCNC: 35.3 G/DL (ref 31.5–35.7)
MCV RBC AUTO: 91.3 FL (ref 79–97)
MONOCYTES # BLD AUTO: 0.38 10*3/MM3 (ref 0.1–0.9)
MONOCYTES NFR BLD AUTO: 8.6 % (ref 5–12)
MUCOUS THREADS URNS QL MICRO: ABNORMAL /HPF
NEUTROPHILS NFR BLD AUTO: 2.5 10*3/MM3 (ref 1.7–7)
NEUTROPHILS NFR BLD AUTO: 56.8 % (ref 42.7–76)
NITRITE UR QL STRIP: NEGATIVE
NRBC BLD AUTO-RTO: 0 /100 WBC (ref 0–0.2)
PH UR STRIP.AUTO: 8.5 [PH] (ref 5–8)
PLATELET # BLD AUTO: 145 10*3/MM3 (ref 140–450)
PMV BLD AUTO: 11.8 FL (ref 6–12)
POTASSIUM SERPL-SCNC: 4 MMOL/L (ref 3.5–5.2)
PROT SERPL-MCNC: 7 G/DL (ref 6–8.5)
PROT UR QL STRIP: NEGATIVE
RBC # BLD AUTO: 5.08 10*6/MM3 (ref 4.14–5.8)
RBC # UR STRIP: ABNORMAL /HPF
REF LAB TEST METHOD: ABNORMAL
SODIUM SERPL-SCNC: 138 MMOL/L (ref 136–145)
SP GR UR STRIP: 1.02 (ref 1–1.03)
SQUAMOUS #/AREA URNS HPF: ABNORMAL /HPF
TRI-PHOS CRY URNS QL MICRO: ABNORMAL /HPF
UROBILINOGEN UR QL STRIP: ABNORMAL
WBC # UR STRIP: ABNORMAL /HPF
WBC NRBC COR # BLD: 4.4 10*3/MM3 (ref 3.4–10.8)

## 2022-03-30 PROCEDURE — 83690 ASSAY OF LIPASE: CPT | Performed by: EMERGENCY MEDICINE

## 2022-03-30 PROCEDURE — 96375 TX/PRO/DX INJ NEW DRUG ADDON: CPT

## 2022-03-30 PROCEDURE — 85025 COMPLETE CBC W/AUTO DIFF WBC: CPT | Performed by: EMERGENCY MEDICINE

## 2022-03-30 PROCEDURE — 25010000002 DROPERIDOL PER 5 MG: Performed by: EMERGENCY MEDICINE

## 2022-03-30 PROCEDURE — 96374 THER/PROPH/DIAG INJ IV PUSH: CPT

## 2022-03-30 PROCEDURE — 99283 EMERGENCY DEPT VISIT LOW MDM: CPT

## 2022-03-30 PROCEDURE — 25010000002 KETOROLAC TROMETHAMINE PER 15 MG: Performed by: EMERGENCY MEDICINE

## 2022-03-30 PROCEDURE — 81001 URINALYSIS AUTO W/SCOPE: CPT | Performed by: EMERGENCY MEDICINE

## 2022-03-30 PROCEDURE — 80053 COMPREHEN METABOLIC PANEL: CPT | Performed by: EMERGENCY MEDICINE

## 2022-03-30 PROCEDURE — 74177 CT ABD & PELVIS W/CONTRAST: CPT

## 2022-03-30 PROCEDURE — 70450 CT HEAD/BRAIN W/O DYE: CPT

## 2022-03-30 PROCEDURE — 25010000002 IOPAMIDOL 61 % SOLUTION: Performed by: EMERGENCY MEDICINE

## 2022-03-30 PROCEDURE — 25010000002 ONDANSETRON PER 1 MG: Performed by: EMERGENCY MEDICINE

## 2022-03-30 RX ORDER — KETOROLAC TROMETHAMINE 15 MG/ML
15 INJECTION, SOLUTION INTRAMUSCULAR; INTRAVENOUS ONCE
Status: COMPLETED | OUTPATIENT
Start: 2022-03-30 | End: 2022-03-30

## 2022-03-30 RX ORDER — SODIUM CHLORIDE 0.9 % (FLUSH) 0.9 %
10 SYRINGE (ML) INJECTION AS NEEDED
Status: DISCONTINUED | OUTPATIENT
Start: 2022-03-30 | End: 2022-03-30 | Stop reason: HOSPADM

## 2022-03-30 RX ORDER — DROPERIDOL 2.5 MG/ML
2.5 INJECTION, SOLUTION INTRAMUSCULAR; INTRAVENOUS ONCE
Status: COMPLETED | OUTPATIENT
Start: 2022-03-30 | End: 2022-03-30

## 2022-03-30 RX ORDER — ONDANSETRON 2 MG/ML
4 INJECTION INTRAMUSCULAR; INTRAVENOUS ONCE
Status: COMPLETED | OUTPATIENT
Start: 2022-03-30 | End: 2022-03-30

## 2022-03-30 RX ADMIN — KETOROLAC TROMETHAMINE 15 MG: 15 INJECTION, SOLUTION INTRAMUSCULAR; INTRAVENOUS at 16:23

## 2022-03-30 RX ADMIN — DROPERIDOL 2.5 MG: 2.5 INJECTION, SOLUTION INTRAMUSCULAR; INTRAVENOUS at 17:39

## 2022-03-30 RX ADMIN — IOPAMIDOL 85 ML: 612 INJECTION, SOLUTION INTRAVENOUS at 17:29

## 2022-03-30 RX ADMIN — ONDANSETRON 4 MG: 2 INJECTION INTRAMUSCULAR; INTRAVENOUS at 16:23

## 2022-03-30 RX ADMIN — SODIUM CHLORIDE, POTASSIUM CHLORIDE, SODIUM LACTATE AND CALCIUM CHLORIDE 1000 ML: 600; 310; 30; 20 INJECTION, SOLUTION INTRAVENOUS at 16:15

## 2022-03-31 ENCOUNTER — APPOINTMENT (OUTPATIENT)
Dept: CARDIOLOGY | Facility: HOSPITAL | Age: 22
End: 2022-03-31

## 2022-03-31 ENCOUNTER — HOSPITAL ENCOUNTER (OUTPATIENT)
Facility: HOSPITAL | Age: 22
Setting detail: OBSERVATION
Discharge: HOME | End: 2022-03-31
Attending: EMERGENCY MEDICINE | Admitting: EMERGENCY MEDICINE

## 2022-03-31 ENCOUNTER — APPOINTMENT (OUTPATIENT)
Dept: MRI IMAGING | Facility: HOSPITAL | Age: 22
End: 2022-03-31

## 2022-03-31 ENCOUNTER — NURSE TRIAGE (OUTPATIENT)
Dept: CALL CENTER | Facility: HOSPITAL | Age: 22
End: 2022-03-31

## 2022-03-31 VITALS
SYSTOLIC BLOOD PRESSURE: 124 MMHG | BODY MASS INDEX: 18.9 KG/M2 | WEIGHT: 135 LBS | HEART RATE: 81 BPM | DIASTOLIC BLOOD PRESSURE: 82 MMHG | TEMPERATURE: 98.1 F | RESPIRATION RATE: 18 BRPM | HEIGHT: 71 IN | OXYGEN SATURATION: 100 %

## 2022-03-31 DIAGNOSIS — R20.2 BILATERAL LEG PARESTHESIA: Primary | ICD-10-CM

## 2022-03-31 LAB
ANION GAP SERPL CALCULATED.3IONS-SCNC: 12 MMOL/L (ref 5–15)
BASOPHILS # BLD AUTO: 0.03 10*3/MM3 (ref 0–0.2)
BASOPHILS NFR BLD AUTO: 0.3 % (ref 0–1.5)
BH CV LOWER VASCULAR LEFT COMMON FEMORAL AUGMENT: NORMAL
BH CV LOWER VASCULAR LEFT COMMON FEMORAL COMPETENT: NORMAL
BH CV LOWER VASCULAR LEFT COMMON FEMORAL COMPRESS: NORMAL
BH CV LOWER VASCULAR LEFT COMMON FEMORAL PHASIC: NORMAL
BH CV LOWER VASCULAR LEFT COMMON FEMORAL SPONT: NORMAL
BH CV LOWER VASCULAR LEFT DISTAL FEMORAL COMPRESS: NORMAL
BH CV LOWER VASCULAR LEFT GASTRONEMIUS COMPRESS: NORMAL
BH CV LOWER VASCULAR LEFT GREATER SAPH AK COMPRESS: NORMAL
BH CV LOWER VASCULAR LEFT GREATER SAPH BK COMPRESS: NORMAL
BH CV LOWER VASCULAR LEFT LESSER SAPH COMPRESS: NORMAL
BH CV LOWER VASCULAR LEFT MID FEMORAL AUGMENT: NORMAL
BH CV LOWER VASCULAR LEFT MID FEMORAL COMPETENT: NORMAL
BH CV LOWER VASCULAR LEFT MID FEMORAL COMPRESS: NORMAL
BH CV LOWER VASCULAR LEFT MID FEMORAL PHASIC: NORMAL
BH CV LOWER VASCULAR LEFT MID FEMORAL SPONT: NORMAL
BH CV LOWER VASCULAR LEFT PERONEAL COMPRESS: NORMAL
BH CV LOWER VASCULAR LEFT POPLITEAL AUGMENT: NORMAL
BH CV LOWER VASCULAR LEFT POPLITEAL COMPETENT: NORMAL
BH CV LOWER VASCULAR LEFT POPLITEAL COMPRESS: NORMAL
BH CV LOWER VASCULAR LEFT POPLITEAL PHASIC: NORMAL
BH CV LOWER VASCULAR LEFT POPLITEAL SPONT: NORMAL
BH CV LOWER VASCULAR LEFT POSTERIOR TIBIAL COMPRESS: NORMAL
BH CV LOWER VASCULAR LEFT PROFUNDA FEMORAL COMPRESS: NORMAL
BH CV LOWER VASCULAR LEFT PROXIMAL FEMORAL COMPRESS: NORMAL
BH CV LOWER VASCULAR LEFT SAPHENOFEMORAL JUNCTION COMPRESS: NORMAL
BH CV LOWER VASCULAR RIGHT COMMON FEMORAL AUGMENT: NORMAL
BH CV LOWER VASCULAR RIGHT COMMON FEMORAL COMPETENT: NORMAL
BH CV LOWER VASCULAR RIGHT COMMON FEMORAL COMPRESS: NORMAL
BH CV LOWER VASCULAR RIGHT COMMON FEMORAL PHASIC: NORMAL
BH CV LOWER VASCULAR RIGHT COMMON FEMORAL SPONT: NORMAL
BH CV LOWER VASCULAR RIGHT DISTAL FEMORAL COMPRESS: NORMAL
BH CV LOWER VASCULAR RIGHT GASTRONEMIUS COMPRESS: NORMAL
BH CV LOWER VASCULAR RIGHT GREATER SAPH AK COMPRESS: NORMAL
BH CV LOWER VASCULAR RIGHT GREATER SAPH BK COMPRESS: NORMAL
BH CV LOWER VASCULAR RIGHT LESSER SAPH COMPRESS: NORMAL
BH CV LOWER VASCULAR RIGHT MID FEMORAL AUGMENT: NORMAL
BH CV LOWER VASCULAR RIGHT MID FEMORAL COMPETENT: NORMAL
BH CV LOWER VASCULAR RIGHT MID FEMORAL COMPRESS: NORMAL
BH CV LOWER VASCULAR RIGHT MID FEMORAL PHASIC: NORMAL
BH CV LOWER VASCULAR RIGHT MID FEMORAL SPONT: NORMAL
BH CV LOWER VASCULAR RIGHT PERONEAL COMPRESS: NORMAL
BH CV LOWER VASCULAR RIGHT POPLITEAL AUGMENT: NORMAL
BH CV LOWER VASCULAR RIGHT POPLITEAL COMPETENT: NORMAL
BH CV LOWER VASCULAR RIGHT POPLITEAL COMPRESS: NORMAL
BH CV LOWER VASCULAR RIGHT POPLITEAL PHASIC: NORMAL
BH CV LOWER VASCULAR RIGHT POPLITEAL SPONT: NORMAL
BH CV LOWER VASCULAR RIGHT POSTERIOR TIBIAL COMPRESS: NORMAL
BH CV LOWER VASCULAR RIGHT PROFUNDA FEMORAL COMPRESS: NORMAL
BH CV LOWER VASCULAR RIGHT PROXIMAL FEMORAL COMPRESS: NORMAL
BH CV LOWER VASCULAR RIGHT SAPHENOFEMORAL JUNCTION COMPRESS: NORMAL
BUN SERPL-MCNC: 14 MG/DL (ref 6–20)
BUN/CREAT SERPL: 12.4 (ref 7–25)
CALCIUM SPEC-SCNC: 9.8 MG/DL (ref 8.6–10.5)
CHLORIDE SERPL-SCNC: 102 MMOL/L (ref 98–107)
CO2 SERPL-SCNC: 23 MMOL/L (ref 22–29)
CREAT SERPL-MCNC: 1.13 MG/DL (ref 0.76–1.27)
CRP SERPL-MCNC: <0.3 MG/DL (ref 0–0.5)
DEPRECATED RDW RBC AUTO: 41 FL (ref 37–54)
EGFRCR SERPLBLD CKD-EPI 2021: 94.8 ML/MIN/1.73
EOSINOPHIL # BLD AUTO: 0.02 10*3/MM3 (ref 0–0.4)
EOSINOPHIL NFR BLD AUTO: 0.2 % (ref 0.3–6.2)
ERYTHROCYTE [DISTWIDTH] IN BLOOD BY AUTOMATED COUNT: 12.7 % (ref 12.3–15.4)
ERYTHROCYTE [SEDIMENTATION RATE] IN BLOOD: 2 MM/HR (ref 0–15)
FOLATE SERPL-MCNC: 19.9 NG/ML (ref 4.78–24.2)
GLUCOSE SERPL-MCNC: 91 MG/DL (ref 65–99)
HCT VFR BLD AUTO: 48.2 % (ref 37.5–51)
HGB BLD-MCNC: 17.6 G/DL (ref 13–17.7)
IMM GRANULOCYTES # BLD AUTO: 0.03 10*3/MM3 (ref 0–0.05)
IMM GRANULOCYTES NFR BLD AUTO: 0.3 % (ref 0–0.5)
LYMPHOCYTES # BLD AUTO: 2.33 10*3/MM3 (ref 0.7–3.1)
LYMPHOCYTES NFR BLD AUTO: 24.8 % (ref 19.6–45.3)
MAGNESIUM SERPL-MCNC: 2.2 MG/DL (ref 1.6–2.6)
MAXIMAL PREDICTED HEART RATE: 199 BPM
MCH RBC QN AUTO: 33.1 PG (ref 26.6–33)
MCHC RBC AUTO-ENTMCNC: 36.5 G/DL (ref 31.5–35.7)
MCV RBC AUTO: 90.8 FL (ref 79–97)
MONOCYTES # BLD AUTO: 0.82 10*3/MM3 (ref 0.1–0.9)
MONOCYTES NFR BLD AUTO: 8.7 % (ref 5–12)
NEUTROPHILS NFR BLD AUTO: 6.15 10*3/MM3 (ref 1.7–7)
NEUTROPHILS NFR BLD AUTO: 65.7 % (ref 42.7–76)
NRBC BLD AUTO-RTO: 0 /100 WBC (ref 0–0.2)
PLATELET # BLD AUTO: 213 10*3/MM3 (ref 140–450)
PMV BLD AUTO: 12.2 FL (ref 6–12)
POTASSIUM SERPL-SCNC: 3.9 MMOL/L (ref 3.5–5.2)
RBC # BLD AUTO: 5.31 10*6/MM3 (ref 4.14–5.8)
SARS-COV-2 RNA PNL SPEC NAA+PROBE: NOT DETECTED
SODIUM SERPL-SCNC: 137 MMOL/L (ref 136–145)
STRESS TARGET HR: 169 BPM
T4 FREE SERPL-MCNC: 1.47 NG/DL (ref 0.93–1.7)
TSH SERPL DL<=0.05 MIU/L-ACNC: 3.06 UIU/ML (ref 0.27–4.2)
VIT B12 BLD-MCNC: 340 PG/ML (ref 211–946)
WBC NRBC COR # BLD: 9.38 10*3/MM3 (ref 3.4–10.8)

## 2022-03-31 PROCEDURE — 86140 C-REACTIVE PROTEIN: CPT | Performed by: STUDENT IN AN ORGANIZED HEALTH CARE EDUCATION/TRAINING PROGRAM

## 2022-03-31 PROCEDURE — A9577 INJ MULTIHANCE: HCPCS | Performed by: EMERGENCY MEDICINE

## 2022-03-31 PROCEDURE — 84439 ASSAY OF FREE THYROXINE: CPT | Performed by: EMERGENCY MEDICINE

## 2022-03-31 PROCEDURE — 80048 BASIC METABOLIC PNL TOTAL CA: CPT | Performed by: EMERGENCY MEDICINE

## 2022-03-31 PROCEDURE — 84443 ASSAY THYROID STIM HORMONE: CPT | Performed by: EMERGENCY MEDICINE

## 2022-03-31 PROCEDURE — 99284 EMERGENCY DEPT VISIT MOD MDM: CPT

## 2022-03-31 PROCEDURE — 93970 EXTREMITY STUDY: CPT

## 2022-03-31 PROCEDURE — G0378 HOSPITAL OBSERVATION PER HR: HCPCS

## 2022-03-31 PROCEDURE — 83735 ASSAY OF MAGNESIUM: CPT | Performed by: EMERGENCY MEDICINE

## 2022-03-31 PROCEDURE — 72157 MRI CHEST SPINE W/O & W/DYE: CPT

## 2022-03-31 PROCEDURE — 72156 MRI NECK SPINE W/O & W/DYE: CPT

## 2022-03-31 PROCEDURE — 85025 COMPLETE CBC W/AUTO DIFF WBC: CPT | Performed by: EMERGENCY MEDICINE

## 2022-03-31 PROCEDURE — 82746 ASSAY OF FOLIC ACID SERUM: CPT | Performed by: STUDENT IN AN ORGANIZED HEALTH CARE EDUCATION/TRAINING PROGRAM

## 2022-03-31 PROCEDURE — 0 GADOBENATE DIMEGLUMINE 529 MG/ML SOLUTION: Performed by: EMERGENCY MEDICINE

## 2022-03-31 PROCEDURE — 99214 OFFICE O/P EST MOD 30 MIN: CPT | Performed by: STUDENT IN AN ORGANIZED HEALTH CARE EDUCATION/TRAINING PROGRAM

## 2022-03-31 PROCEDURE — 82607 VITAMIN B-12: CPT | Performed by: STUDENT IN AN ORGANIZED HEALTH CARE EDUCATION/TRAINING PROGRAM

## 2022-03-31 PROCEDURE — 96374 THER/PROPH/DIAG INJ IV PUSH: CPT

## 2022-03-31 PROCEDURE — C9803 HOPD COVID-19 SPEC COLLECT: HCPCS

## 2022-03-31 PROCEDURE — 25010000002 LORAZEPAM PER 2 MG: Performed by: EMERGENCY MEDICINE

## 2022-03-31 PROCEDURE — 87635 SARS-COV-2 COVID-19 AMP PRB: CPT | Performed by: EMERGENCY MEDICINE

## 2022-03-31 PROCEDURE — 85652 RBC SED RATE AUTOMATED: CPT | Performed by: STUDENT IN AN ORGANIZED HEALTH CARE EDUCATION/TRAINING PROGRAM

## 2022-03-31 RX ORDER — NITROGLYCERIN 0.4 MG/1
0.4 TABLET SUBLINGUAL
Status: DISCONTINUED | OUTPATIENT
Start: 2022-03-31 | End: 2022-04-01 | Stop reason: HOSPADM

## 2022-03-31 RX ORDER — ONDANSETRON 2 MG/ML
4 INJECTION INTRAMUSCULAR; INTRAVENOUS EVERY 6 HOURS PRN
Status: DISCONTINUED | OUTPATIENT
Start: 2022-03-31 | End: 2022-04-01 | Stop reason: HOSPADM

## 2022-03-31 RX ORDER — SODIUM CHLORIDE 0.9 % (FLUSH) 0.9 %
10 SYRINGE (ML) INJECTION AS NEEDED
Status: DISCONTINUED | OUTPATIENT
Start: 2022-03-31 | End: 2022-04-01 | Stop reason: HOSPADM

## 2022-03-31 RX ORDER — ACETAMINOPHEN 325 MG/1
650 TABLET ORAL EVERY 4 HOURS PRN
Status: DISCONTINUED | OUTPATIENT
Start: 2022-03-31 | End: 2022-04-01 | Stop reason: HOSPADM

## 2022-03-31 RX ORDER — SODIUM CHLORIDE 0.9 % (FLUSH) 0.9 %
10 SYRINGE (ML) INJECTION EVERY 12 HOURS SCHEDULED
Status: DISCONTINUED | OUTPATIENT
Start: 2022-03-31 | End: 2022-04-01 | Stop reason: HOSPADM

## 2022-03-31 RX ORDER — HYDROXYZINE HYDROCHLORIDE 25 MG/1
25 TABLET, FILM COATED ORAL EVERY 6 HOURS PRN
Status: DISCONTINUED | OUTPATIENT
Start: 2022-03-31 | End: 2022-04-01 | Stop reason: HOSPADM

## 2022-03-31 RX ORDER — ONDANSETRON 4 MG/1
4 TABLET, FILM COATED ORAL EVERY 6 HOURS PRN
Status: DISCONTINUED | OUTPATIENT
Start: 2022-03-31 | End: 2022-04-01 | Stop reason: HOSPADM

## 2022-03-31 RX ORDER — LORAZEPAM 2 MG/ML
1 INJECTION INTRAMUSCULAR ONCE
Status: COMPLETED | OUTPATIENT
Start: 2022-03-31 | End: 2022-03-31

## 2022-03-31 RX ADMIN — LORAZEPAM 1 MG: 2 INJECTION INTRAMUSCULAR; INTRAVENOUS at 16:15

## 2022-03-31 RX ADMIN — GADOBENATE DIMEGLUMINE 12 ML: 529 INJECTION, SOLUTION INTRAVENOUS at 17:20

## 2022-03-31 NOTE — TELEPHONE ENCOUNTER
Reason for Disposition  • [1] Numbness (i.e., loss of sensation) of the face, arm / hand, or leg / foot on one side of the body AND [2] gradual onset (e.g., days to weeks) AND [3] present now    Additional Information  • Negative: [1] SEVERE weakness (i.e., unable to walk or barely able to walk, requires support) AND [2] new-onset or worsening  • Negative: [1] Weakness (i.e., paralysis, loss of muscle strength) of the face, arm / hand, or leg / foot on one side of the body AND [2] sudden onset AND [3] present now (Exception: Bell's palsy suspected [i.e., weakness only one side of the face, developing over hours to days, no other symptoms])  • Negative: [1] Numbness (i.e., loss of sensation) of the face, arm / hand, or leg / foot on one side of the body AND [2] sudden onset AND [3] present now  • Negative: [1] Loss of speech or garbled speech AND [2] sudden onset AND [3] present now  • Negative: Difficult to awaken or acting confused (e.g., disoriented, slurred speech)  • Negative: Sounds like a life-threatening emergency to the triager  • Negative: Confusion, disorientation, or hallucinations is main symptom  • Negative: Neck pain is main symptom (and having weakness, numbness, or tingling in arm / hand because of neck pain)  • Negative: Back pain is main symptom (and having weakness, numbness, or tingling in leg because of back pain)  • Negative: Hand pain is main symptom (and having mild weakness, numbness, or tingling in hand related to hand pain)  • Negative: Dizziness is main symptom  • Negative: Vision loss or change is main symptom  • Negative: Followed a head injury within last 3 days  • Negative: Followed a neck injury within last 3 days  • Negative: [1] Tingling in both hands and/or feet AND [2] breathing faster than normal AND [3] feels similar to prior panic attack or hyperventilation episode  • Negative: Weakness in both sides of the body or weakness all over  • Negative: Headache  (and neurologic  "deficit)  • Negative: [1] Back pain AND [2] numbness (loss of sensation) in groin or rectal area  • Negative: [1] Unable to urinate (or only a few drops) > 4 hours AND [2] bladder feels very full (e.g., palpable bladder or strong urge to urinate)  • Negative: [1] Loss of bladder or bowel control (urine or bowel incontinence; wetting self, leaking stool) AND [2] new-onset  • Negative: [1] Weakness (i.e., paralysis, loss of muscle strength) of the face, arm / hand, or leg / foot on one side of the body AND [2] sudden onset AND [3] brief (now gone)  • Negative: [1] Numbness (i.e., loss of sensation) of the face, arm / hand, or leg / foot on one side of the body AND [2] sudden onset AND [3] brief (now gone)  • Negative: [1] Loss of speech or garbled speech AND [2] sudden onset AND [3] brief (now gone)  • Negative: Bell's palsy suspected (i.e., weakness on only one side of the face, developing over hours to days, no other symptoms)  • Negative: Patient sounds very sick or weak to the triager  • Negative: Neck pain (and neurologic deficit)  • Negative: Back pain (and neurologic deficit)  • Negative: [1] Weakness of the face, arm / hand, or leg / foot on one side of the body AND [2] gradual onset (e.g., days to weeks) AND [3] present now    Answer Assessment - Initial Assessment Questions  1. SYMPTOM: \"What is the main symptom you are concerned about?\" (e.g., weakness, numbness)      numbness  2. ONSET: \"When did this start?\" (minutes, hours, days; while sleeping)      Last night  3. LAST NORMAL: \"When was the last time you were normal (no symptoms)?\"      yesterday  4. PATTERN \"Does this come and go, or has it been constant since it started?\"  \"Is it present now?\"      constant  5. CARDIAC SYMPTOMS: \"Have you had any of the following symptoms: chest pain, difficulty breathing, palpitations?\"      na  6. NEUROLOGIC SYMPTOMS: \"Have you had any of the following symptoms: headache, dizziness, vision loss, double vision, " "changes in speech, unsteady on your feet?\"      Headache, unsteady  7. OTHER SYMPTOMS: \"Do you have any other symptoms?\"      Numbness from the waist down  8. PREGNANCY: \"Is there any chance you are pregnant?\" \"When was your last menstrual period?\"      na    Protocols used: NEUROLOGIC DEFICIT-ADULT-AH      "

## 2022-04-01 NOTE — DISCHARGE INSTRUCTIONS
Continue taking home medications as prescribed.  Follow-up with neurology outpatient for continued symptoms as needed.  Follow-up with psychiatry as needed.  Follow-up with your primary care provider in 1 to 2 weeks.    Return to the emergency department with worsening symptoms, uncontrolled pain, inability to tolerate oral liquids, fever greater than 101°F not controlled by Tylenol or as needed with emergent concerns.

## 2022-04-01 NOTE — PROGRESS NOTES
ED [facility] ATTESTATION NOTE    The RAMANDEEP and I have discussed this patient's history, physical exam, and treatment plan.  I have reviewed the documentation and personally had a face to face interaction with the patient. I affirm the documentation and agree with the treatment and plan.  The attached note describes my personal findings.      I provided a substantive portion of the care of this patient. I personally performed the physical exam, in its entirety.    Wilber Hopkins is a 22 y.o. male who presented to the emergency department on today complaining of a dense numbness from his umbilicus distally.  He states he was in the emergency department yesterday for abdominal pain.  He reports after leaving yesterday he developed distance numbness with pain from his umbilicus down both of his legs.  He states his symptoms have significantly improved at this point.  In the emergency department he had normal chemistries, normal CRP, normal blood counts.  Neurology was consulted and he was admitted to the observation unit for further evaluation.    We have obtained a neurology consultation, obtained MRIs of the cervical and thoracic spines.  We are pending further recommendations from neurology.      On exam:  GENERAL: Awake, anxious, alert, no acute distress  SKIN: Warm, dry  HENT: Normocephalic, atraumatic  EYES: no scleral icterus  CV: regular rhythm, regular rate  RESPIRATORY: normal effort, lungs clear  ABDOMEN: soft, nontender, nondistended  MUSCULOSKELETAL: no deformity  NEURO: alert, moves all extremities, follows commands        Labs  Recent Results (from the past 24 hour(s))   Basic Metabolic Panel    Collection Time: 03/31/22  3:06 PM    Specimen: Blood   Result Value Ref Range    Glucose 91 65 - 99 mg/dL    BUN 14 6 - 20 mg/dL    Creatinine 1.13 0.76 - 1.27 mg/dL    Sodium 137 136 - 145 mmol/L    Potassium 3.9 3.5 - 5.2 mmol/L    Chloride 102 98 - 107 mmol/L    CO2 23.0 22.0 - 29.0 mmol/L    Calcium 9.8 8.6 - 10.5  mg/dL    BUN/Creatinine Ratio 12.4 7.0 - 25.0    Anion Gap 12.0 5.0 - 15.0 mmol/L    eGFR 94.8 >60.0 mL/min/1.73   T4, Free    Collection Time: 03/31/22  3:06 PM    Specimen: Blood   Result Value Ref Range    Free T4 1.47 0.93 - 1.70 ng/dL   TSH    Collection Time: 03/31/22  3:06 PM    Specimen: Blood   Result Value Ref Range    TSH 3.060 0.270 - 4.200 uIU/mL   Magnesium    Collection Time: 03/31/22  3:06 PM    Specimen: Blood   Result Value Ref Range    Magnesium 2.2 1.6 - 2.6 mg/dL   CBC Auto Differential    Collection Time: 03/31/22  3:06 PM    Specimen: Blood   Result Value Ref Range    WBC 9.38 3.40 - 10.80 10*3/mm3    RBC 5.31 4.14 - 5.80 10*6/mm3    Hemoglobin 17.6 13.0 - 17.7 g/dL    Hematocrit 48.2 37.5 - 51.0 %    MCV 90.8 79.0 - 97.0 fL    MCH 33.1 (H) 26.6 - 33.0 pg    MCHC 36.5 (H) 31.5 - 35.7 g/dL    RDW 12.7 12.3 - 15.4 %    RDW-SD 41.0 37.0 - 54.0 fl    MPV 12.2 (H) 6.0 - 12.0 fL    Platelets 213 140 - 450 10*3/mm3    Neutrophil % 65.7 42.7 - 76.0 %    Lymphocyte % 24.8 19.6 - 45.3 %    Monocyte % 8.7 5.0 - 12.0 %    Eosinophil % 0.2 (L) 0.3 - 6.2 %    Basophil % 0.3 0.0 - 1.5 %    Immature Grans % 0.3 0.0 - 0.5 %    Neutrophils, Absolute 6.15 1.70 - 7.00 10*3/mm3    Lymphocytes, Absolute 2.33 0.70 - 3.10 10*3/mm3    Monocytes, Absolute 0.82 0.10 - 0.90 10*3/mm3    Eosinophils, Absolute 0.02 0.00 - 0.40 10*3/mm3    Basophils, Absolute 0.03 0.00 - 0.20 10*3/mm3    Immature Grans, Absolute 0.03 0.00 - 0.05 10*3/mm3    nRBC 0.0 0.0 - 0.2 /100 WBC   Sedimentation Rate    Collection Time: 03/31/22  3:06 PM    Specimen: Blood   Result Value Ref Range    Sed Rate 2 0 - 15 mm/hr   C-reactive Protein    Collection Time: 03/31/22  3:06 PM    Specimen: Blood   Result Value Ref Range    C-Reactive Protein <0.30 0.00 - 0.50 mg/dL   COVID-19,[facility] IN-HOUSE CEPHEID/RITIKA NP SWAB IN TRANSPORT MEDIA 8-12 HR TAT - Swab, Nasopharynx    Collection Time: 03/31/22  6:16 PM    Specimen: Nasopharynx; Swab   Result Value Ref  Range    COVID19 Not Detected Not Detected - Ref. Range   Duplex Venous Lower Extremity - Bilateral CAR    Collection Time: 03/31/22  6:23 PM   Result Value Ref Range    Target HR (85%) 169 bpm    Max. Pred. HR (100%) 199 bpm    Right Common Femoral Spont Y     Right Common Femoral Phasic Y     Right Common Femoral Augment Y     Right Common Femoral Competent Y     Right Common Femoral Compress C     Right Saphenofemoral Junction Compress C     Right Profunda Femoral Compress C     Right Proximal Femoral Compress C     Right Mid Femoral Spont Y     Right Mid Femoral Phasic Y     Right Mid Femoral Augment Y     Right Mid Femoral Competent Y     Right Mid Femoral Compress C     Right Distal Femoral Compress C     Right Popliteal Spont Y     Right Popliteal Phasic Y     Right Popliteal Augment Y     Right Popliteal Competent Y     Right Popliteal Compress C     Right Posterior Tibial Compress C     Right Peroneal Compress C     Right Gastronemius Compress C     Right Greater Saph AK Compress C     Right Greater Saph BK Compress C     Right Lesser Saph Compress C     Left Common Femoral Spont Y     Left Common Femoral Phasic Y     Left Common Femoral Augment Y     Left Common Femoral Competent Y     Left Common Femoral Compress C     Left Saphenofemoral Junction Compress C     Left Profunda Femoral Compress C     Left Proximal Femoral Compress C     Left Mid Femoral Spont Y     Left Mid Femoral Phasic Y     Left Mid Femoral Augment Y     Left Mid Femoral Competent Y     Left Mid Femoral Compress C     Left Distal Femoral Compress C     Left Popliteal Spont Y     Left Popliteal Phasic Y     Left Popliteal Augment Y     Left Popliteal Competent Y     Left Popliteal Compress C     Left Posterior Tibial Compress C     Left Peroneal Compress C     Left Gastronemius Compress C     Left Greater Saph AK Compress C     Left Greater Saph BK Compress C     Left Lesser Saph Compress C        Radiology  MRI Cervical Spine With &  Without Contrast, MRI Thoracic Spine With & Without Contrast    Result Date: 3/31/2022  MRI CERVICAL SPINE W WO CONTRAST-, MRI THORACIC SPINE W WO CONTRAST-  CLINICAL HISTORY: 22-year-old male with lower extremity weakness and numbness. Diminished reflexes and arms and legs.  TECHNIQUE: Sagittal T1, T2 and gradient echo images of the cervical spine were obtained. Sagittal T1, T2 and proton-density-weighted images of the thoracic spine were acquired. Sagittal and axial T1 and T2-weighted images were also acquired through both areas, including postcontrast T1-weighted images.  COMPARISON: None  FINDINGS: There is normal alignment. All of the cervical and thoracic vertebral bodies are normal in height. There is no significant disc bulging or focal disc herniation or spinal canal or foraminal stenosis at any level within the cervical spine or thoracic spine. The spinal cord shows normal signal intensity. In particular, there is no evidence of transverse myelitis. There is no abnormal enhancement within the subarachnoid space or bone marrow.      Normal MRI imaging of the cervical spine and thoracic spine with and without contrast.  This report was finalized on 3/31/2022 6:09 PM by Dr. Darrick Branham M.D.      Duplex Venous Lower Extremity - Bilateral CAR    Result Date: 3/31/2022  · Normal bilateral lower extremity venous duplex scan.                PPE: The patient and I wore a mask throughout the entire patient encounter.

## 2022-04-19 ENCOUNTER — APPOINTMENT (OUTPATIENT)
Dept: GENERAL RADIOLOGY | Facility: HOSPITAL | Age: 22
End: 2022-04-19

## 2022-04-19 ENCOUNTER — HOSPITAL ENCOUNTER (EMERGENCY)
Facility: HOSPITAL | Age: 22
Discharge: HOME | End: 2022-04-20
Attending: EMERGENCY MEDICINE | Admitting: EMERGENCY MEDICINE

## 2022-04-19 DIAGNOSIS — F41.0 PANIC ATTACK: Primary | ICD-10-CM

## 2022-04-19 LAB
ANION GAP SERPL CALCULATED.3IONS-SCNC: 15 MMOL/L (ref 5–15)
BASOPHILS # BLD AUTO: 0.03 10*3/MM3 (ref 0–0.2)
BASOPHILS NFR BLD AUTO: 0.4 % (ref 0–1.5)
BUN SERPL-MCNC: 17 MG/DL (ref 6–20)
BUN/CREAT SERPL: 20.2 (ref 7–25)
CALCIUM SPEC-SCNC: 9.6 MG/DL (ref 8.6–10.5)
CHLORIDE SERPL-SCNC: 105 MMOL/L (ref 98–107)
CO2 SERPL-SCNC: 22 MMOL/L (ref 22–29)
CREAT SERPL-MCNC: 0.84 MG/DL (ref 0.76–1.27)
DEPRECATED RDW RBC AUTO: 43.2 FL (ref 37–54)
EGFRCR SERPLBLD CKD-EPI 2021: 127.2 ML/MIN/1.73
EOSINOPHIL # BLD AUTO: 0.03 10*3/MM3 (ref 0–0.4)
EOSINOPHIL NFR BLD AUTO: 0.4 % (ref 0.3–6.2)
ERYTHROCYTE [DISTWIDTH] IN BLOOD BY AUTOMATED COUNT: 12.8 % (ref 12.3–15.4)
GLUCOSE SERPL-MCNC: 103 MG/DL (ref 65–99)
HCT VFR BLD AUTO: 45.8 % (ref 37.5–51)
HGB BLD-MCNC: 16.3 G/DL (ref 13–17.7)
IMM GRANULOCYTES # BLD AUTO: 0.02 10*3/MM3 (ref 0–0.05)
IMM GRANULOCYTES NFR BLD AUTO: 0.3 % (ref 0–0.5)
LYMPHOCYTES # BLD AUTO: 1.95 10*3/MM3 (ref 0.7–3.1)
LYMPHOCYTES NFR BLD AUTO: 26.4 % (ref 19.6–45.3)
MAGNESIUM SERPL-MCNC: 2 MG/DL (ref 1.6–2.6)
MCH RBC QN AUTO: 33 PG (ref 26.6–33)
MCHC RBC AUTO-ENTMCNC: 35.6 G/DL (ref 31.5–35.7)
MCV RBC AUTO: 92.7 FL (ref 79–97)
MONOCYTES # BLD AUTO: 0.64 10*3/MM3 (ref 0.1–0.9)
MONOCYTES NFR BLD AUTO: 8.6 % (ref 5–12)
NEUTROPHILS NFR BLD AUTO: 4.73 10*3/MM3 (ref 1.7–7)
NEUTROPHILS NFR BLD AUTO: 63.9 % (ref 42.7–76)
NRBC BLD AUTO-RTO: 0 /100 WBC (ref 0–0.2)
PLATELET # BLD AUTO: 187 10*3/MM3 (ref 140–450)
PMV BLD AUTO: 12 FL (ref 6–12)
POTASSIUM SERPL-SCNC: 3.3 MMOL/L (ref 3.5–5.2)
RBC # BLD AUTO: 4.94 10*6/MM3 (ref 4.14–5.8)
SODIUM SERPL-SCNC: 142 MMOL/L (ref 136–145)
WBC NRBC COR # BLD: 7.4 10*3/MM3 (ref 3.4–10.8)

## 2022-04-19 PROCEDURE — 80048 BASIC METABOLIC PNL TOTAL CA: CPT | Performed by: EMERGENCY MEDICINE

## 2022-04-19 PROCEDURE — 25010000002 LORAZEPAM PER 2 MG: Performed by: EMERGENCY MEDICINE

## 2022-04-19 PROCEDURE — 99283 EMERGENCY DEPT VISIT LOW MDM: CPT

## 2022-04-19 PROCEDURE — 85025 COMPLETE CBC W/AUTO DIFF WBC: CPT | Performed by: EMERGENCY MEDICINE

## 2022-04-19 PROCEDURE — 96374 THER/PROPH/DIAG INJ IV PUSH: CPT

## 2022-04-19 PROCEDURE — 83735 ASSAY OF MAGNESIUM: CPT | Performed by: EMERGENCY MEDICINE

## 2022-04-19 PROCEDURE — 71045 X-RAY EXAM CHEST 1 VIEW: CPT

## 2022-04-19 RX ORDER — SODIUM CHLORIDE 0.9 % (FLUSH) 0.9 %
10 SYRINGE (ML) INJECTION AS NEEDED
Status: DISCONTINUED | OUTPATIENT
Start: 2022-04-19 | End: 2022-04-20 | Stop reason: HOSPADM

## 2022-04-19 RX ORDER — LORAZEPAM 2 MG/ML
1 INJECTION INTRAMUSCULAR ONCE
Status: COMPLETED | OUTPATIENT
Start: 2022-04-19 | End: 2022-04-19

## 2022-04-19 RX ADMIN — LORAZEPAM 1 MG: 2 INJECTION INTRAMUSCULAR; INTRAVENOUS at 23:19

## 2022-04-20 VITALS
RESPIRATION RATE: 20 BRPM | HEART RATE: 60 BPM | SYSTOLIC BLOOD PRESSURE: 117 MMHG | HEIGHT: 71 IN | WEIGHT: 135 LBS | DIASTOLIC BLOOD PRESSURE: 62 MMHG | OXYGEN SATURATION: 98 % | BODY MASS INDEX: 18.9 KG/M2 | TEMPERATURE: 97.8 F

## 2022-04-20 LAB — QT INTERVAL: 396 MS

## 2022-04-20 PROCEDURE — 93005 ELECTROCARDIOGRAM TRACING: CPT | Performed by: EMERGENCY MEDICINE

## 2022-04-20 PROCEDURE — 93010 ELECTROCARDIOGRAM REPORT: CPT | Performed by: INTERNAL MEDICINE

## 2022-04-20 NOTE — ED PROVIDER NOTES
" EMERGENCY DEPARTMENT ENCOUNTER    Room Number:  36/36  Date of encounter:  4/20/2022  PCP: Abdullahi Carballo MD  Historian: Patient and grandfather      HPI:  Chief Complaint: \"I am having a bad panic attack\"  A complete HPI/ROS/PMH/PSH/SH/FH are unobtainable due to: Not applicable  Context: Wilber Hopkins is a 22 y.o. male who presents to the ED c/o symptoms started about an hour prior to arrival here when he was driving home.  Suddenly he started getting symptoms consistent with panic attack.  He developed some shortness of breath and could not calm himself down.  He developed diffuse body pain and ache.  Patient had numbness and tingling to his hands and feet as well as to his face and around his mouth.  He also was tremulous and shaking.  He has a long history of panic attacks and this felt similar to his previous panic attacks.  He will take Atarax to help his pain attacks in the past.  He did not have his Atarax with him and has not taken any Atarax prior to arrival here.  He has seen a psychiatrist recently and has been placed upon Lexapro within the past week.  He does not smoke he does not drink or and does not take any illegal drugs.        Previous Episodes: Feels similar to his previous panic attacks.  He has a long history of anxiety and panic disorder.  Current Symptoms: See above    MEDICAL HISTORY REVIEWED        PAST MEDICAL HISTORY  Active Ambulatory Problems     Diagnosis Date Noted   • Pain of upper abdomen 07/08/2020   • Diarrhea 07/08/2020   • Constipation 07/08/2020   • Nausea and vomiting 07/08/2020   • Gastroesophageal reflux disease 07/08/2020   • Paresthesia 03/31/2022     Resolved Ambulatory Problems     Diagnosis Date Noted   • No Resolved Ambulatory Problems     Past Medical History:   Diagnosis Date   • Anxiety    • Gastritis    • GERD (gastroesophageal reflux disease)    • OCD (obsessive compulsive disorder)    • Panic attack    • PTSD (post-traumatic stress disorder)          PAST " SURGICAL HISTORY  Past Surgical History:   Procedure Laterality Date   • COLONOSCOPY N/A 7/18/2020    Procedure: COLONOSCOPY to cecum and t.i. with biopsies;  Surgeon: Tong Aly MD;  Location: [facility] ENDOSCOPY;  Service: Gastroenterology;  Laterality: N/A;  pre-  abd pain, n/v, diarrhea  post- normal   • ENDOSCOPY N/A 7/18/2020    Procedure: ESOPHAGOGASTRODUODENOSCOPY with biopsies;  Surgeon: Tong Aly MD;  Location: [facility] ENDOSCOPY;  Service: Gastroenterology;  Laterality: N/A;  pre- abd pain, n/v, diarrhea  post- gastritis         FAMILY HISTORY  No family history on file.      SOCIAL HISTORY  Social History     Socioeconomic History   • Marital status: Single   Tobacco Use   • Smoking status: Never Smoker   • Smokeless tobacco: Never Used   Vaping Use   • Vaping Use: Never used   Substance and Sexual Activity   • Alcohol use: No   • Drug use: No   • Sexual activity: Defer         ALLERGIES  Amoxicillin and Penicillins        REVIEW OF SYSTEMS  Review of Systems     All systems reviewed and negative except for those discussed in HPI.       PHYSICAL EXAM    I have reviewed the triage vital signs and nursing notes.    ED Triage Vitals [04/19/22 2214]   Temp Heart Rate Resp BP SpO2   97.8 °F (36.6 °C) 96 20 126/74 100 %      Temp src Heart Rate Source Patient Position BP Location FiO2 (%)   -- -- -- -- --       GENERAL: Very anxious male hyperventilating.  No acute distress.Vital signs on my initial evaluation he is tachypneic heart rate is 90s to low 100s.  Blood pressure is normal.  He is afebrile.  Has the appearance of someone with a panic attack.  HENT: nares patent  Head/neck/ face are symmetric without gross deformity, signs of trauma, or swelling  EYES: no scleral icterus, no conjunctival pallor.  NECK: Supple, no meningismus  CV: regular rhythm, regular rate with intact distal pulses.  No murmur or rub  RESPIRATORY: normal effort and no respiratory distress.  To auscultation bilaterally  ABDOMEN:  soft and nontender.  MUSCULOSKELETAL: no deformity.  Intact distal pulses to upper and lower extremities that are equal strong and symmetric.  No cyanosis or pallor.  No obvious carpopedal spasm but patient feels as if there is numbness and tingling in his fingers and hands.  NEURO: alert and appropriate, moves all extremities, follows commands.  No focal motor abnormality.  Has some tingling most prominent to face and arms bilaterally.  SKIN: warm, dry    Vital signs and nursing notes reviewed.        LAB RESULTS  Recent Results (from the past 24 hour(s))   Basic Metabolic Panel    Collection Time: 04/19/22 11:18 PM    Specimen: Blood   Result Value Ref Range    Glucose 103 (H) 65 - 99 mg/dL    BUN 17 6 - 20 mg/dL    Creatinine 0.84 0.76 - 1.27 mg/dL    Sodium 142 136 - 145 mmol/L    Potassium 3.3 (L) 3.5 - 5.2 mmol/L    Chloride 105 98 - 107 mmol/L    CO2 22.0 22.0 - 29.0 mmol/L    Calcium 9.6 8.6 - 10.5 mg/dL    BUN/Creatinine Ratio 20.2 7.0 - 25.0    Anion Gap 15.0 5.0 - 15.0 mmol/L    eGFR 127.2 >60.0 mL/min/1.73   Magnesium    Collection Time: 04/19/22 11:18 PM    Specimen: Blood   Result Value Ref Range    Magnesium 2.0 1.6 - 2.6 mg/dL   CBC Auto Differential    Collection Time: 04/19/22 11:18 PM    Specimen: Blood   Result Value Ref Range    WBC 7.40 3.40 - 10.80 10*3/mm3    RBC 4.94 4.14 - 5.80 10*6/mm3    Hemoglobin 16.3 13.0 - 17.7 g/dL    Hematocrit 45.8 37.5 - 51.0 %    MCV 92.7 79.0 - 97.0 fL    MCH 33.0 26.6 - 33.0 pg    MCHC 35.6 31.5 - 35.7 g/dL    RDW 12.8 12.3 - 15.4 %    RDW-SD 43.2 37.0 - 54.0 fl    MPV 12.0 6.0 - 12.0 fL    Platelets 187 140 - 450 10*3/mm3    Neutrophil % 63.9 42.7 - 76.0 %    Lymphocyte % 26.4 19.6 - 45.3 %    Monocyte % 8.6 5.0 - 12.0 %    Eosinophil % 0.4 0.3 - 6.2 %    Basophil % 0.4 0.0 - 1.5 %    Immature Grans % 0.3 0.0 - 0.5 %    Neutrophils, Absolute 4.73 1.70 - 7.00 10*3/mm3    Lymphocytes, Absolute 1.95 0.70 - 3.10 10*3/mm3    Monocytes, Absolute 0.64 0.10 - 0.90  10*3/mm3    Eosinophils, Absolute 0.03 0.00 - 0.40 10*3/mm3    Basophils, Absolute 0.03 0.00 - 0.20 10*3/mm3    Immature Grans, Absolute 0.02 0.00 - 0.05 10*3/mm3    nRBC 0.0 0.0 - 0.2 /100 WBC   ECG 12 Lead    Collection Time: 04/20/22 12:21 AM   Result Value Ref Range    QT Interval 396 ms       Ordered the above labs and independently reviewed the results.        RADIOLOGY  XR Chest 1 View    Result Date: 4/19/2022  SINGLE VIEW OF THE CHEST  HISTORY: Shortness of air  COMPARISON: 06/15/2021  FINDINGS: Heart size is within normal limits. No pneumothorax, pleural effusion, or acute infiltrate is seen.      No acute findings.  This report was finalized on 4/19/2022 11:38 PM by Dr. Laura Tamayo M.D.        I ordered the above noted radiological studies. Reviewed by me and discussed with radiologist.  See dictation for official radiology interpretation.      PROCEDURES    Procedures      MEDICATIONS GIVEN IN ER    Medications   sodium chloride 0.9 % flush 10 mL (has no administration in time range)   LORazepam (ATIVAN) injection 1 mg (1 mg Intravenous Given 4/19/22 2640)         PROGRESS, DATA ANALYSIS, CONSULTS, AND MEDICAL DECISION MAKING    Informed patient and spouse that we will get him some medicine to help relax him that he has signs and symptoms very consistent with a panic attack and has had similar episodes in the past.  We will check lab work and a chest x-ray and EKG.  All questions answered at this time.    All labs have been independently reviewed by me.  All radiology studies have been reviewed by me and discussed with radiologist dictating the report.   EKG's independently viewed and interpreted by me.  Discussion below represents my analysis of pertinent findings related to patient's condition, differential diagnosis, treatment plan and final disposition.      ED Course as of 04/20/22 0057   Wed Apr 20, 2022   0006 X-ray is unremarkable.  No acute process. [MM]   0008 Patient is doing much  better.  His heart rate has normalized his blood pressure is normal.  He is resting comfortably.  He states he is feeling much better.  Informed him and his grandfather results of the test. [MM]   0023 EKG reading  EKG was done at 1221  It is a rate of 58 it is normal sinus rhythm  Narrow complex  I do not appreciate any acute abnormality.  QT looks normal  I compared to the previous EKG that was done on 6/15/2021.  I do not appreciate any changes when compared to that. [MM]      ED Course User Index  [MM] Kirt Harris MD       AS OF 00:57 EDT VITALS:    BP - 117/62  HR - 60  TEMP - 97.8 °F (36.6 °C)  02 SATS - 98%        DIAGNOSIS  Final diagnoses:   Panic attack         DISPOSITION  DISCHARGE    Patient discharged in stable condition.    Reviewed implications of results, diagnosis, meds, responsibility to follow up, warning signs and symptoms of possible worsening, potential complications and reasons to return to ER, including worsening of symptoms, worsening of symptoms, fever, any concerns..    Patient/Family voiced understanding of above instructions.    Discussed plan for discharge, as there is no emergent indication for admission. Pt/family is agreeable and understands need for follow up and repeat testing.  Pt is aware that discharge does not mean that nothing is wrong but it indicates no emergency is present that requires admission and they must continue care with follow-up as given below or physician of their choice.     FOLLOW-UP  Abdullahi Carballo MD  [id number] [id number] [phone]      Call tomorrow morning to arrange follow-up this week in the next 2 to 3 days.  Return if symptoms return, chest pain, shortness of breath, fever, any concerns.         Medication List      No changes were made to your prescriptions during this visit.                 DICTATED UTILIZING [employer] DICTATION     Kirt Harris MD  04/20/22 0057

## 2022-04-20 NOTE — ED NOTES
"Patient presents to ED with report of \"anxiety attack\" He reports he is having chest pain and full body numbness.     Patient placed in mask upon arrival, triage staff wearing appropriate PPE.  "

## 2022-05-23 ENCOUNTER — OFFICE VISIT (OUTPATIENT)
Dept: INTERNAL MEDICINE | Facility: CLINIC | Age: 22
End: 2022-05-23

## 2022-05-23 VITALS
HEART RATE: 79 BPM | DIASTOLIC BLOOD PRESSURE: 64 MMHG | BODY MASS INDEX: 20.94 KG/M2 | WEIGHT: 149.6 LBS | SYSTOLIC BLOOD PRESSURE: 120 MMHG | TEMPERATURE: 98.6 F | OXYGEN SATURATION: 99 % | HEIGHT: 71 IN

## 2022-05-23 DIAGNOSIS — G44.209 TENSION HEADACHE: Primary | ICD-10-CM

## 2022-05-23 PROBLEM — K29.00 ACUTE GASTRITIS: Status: ACTIVE | Noted: 2020-07-03

## 2022-05-23 PROBLEM — F41.8 OTHER SPECIFIED ANXIETY DISORDERS: Status: ACTIVE | Noted: 2020-07-03

## 2022-05-23 PROCEDURE — 99213 OFFICE O/P EST LOW 20 MIN: CPT | Performed by: NURSE PRACTITIONER

## 2022-05-23 RX ORDER — TIZANIDINE 2 MG/1
2 TABLET ORAL EVERY 8 HOURS PRN
Qty: 30 TABLET | Refills: 0 | Status: SHIPPED | OUTPATIENT
Start: 2022-05-23 | End: 2022-06-23

## 2022-05-23 RX ORDER — AMITRIPTYLINE HYDROCHLORIDE 25 MG/1
25 TABLET, FILM COATED ORAL NIGHTLY
Qty: 30 TABLET | Refills: 0 | Status: SHIPPED | OUTPATIENT
Start: 2022-05-23 | End: 2022-06-23

## 2022-05-23 NOTE — PROGRESS NOTES
Chief Complaint  Establish Care, Headache (Patient presents here today to establish care and headache. ), and Nausea     Subjective:      History of Present Illness {CC  Problem List  Visit  Diagnosis   Encounters  Notes  Medications  Labs  Result Review Imaging  Media :23}     Wilber Hopkins presents to [facility] for:      He is here to establish care and for evaluation of headaches.    PmHx: GERD/gastritis, anxiety, PTSD, and panic attack    He states last visit with pediatrician about 3 months ago for headaches.      Headache  Headache pattern:  Some headache always there, and the pain level varies  Duration:  3 to 6 months  Providers seen:  Gastroenterologist and psychiatrist  Previous testing:  CT and MRI  Number of ER visits for headache:  3  Time of day symptoms are worse:  No specific time of day  Do headaches wake patient from sleep?: No    Days of the week symptoms are worse:  No specific day of the week  Quality:  Tightness  Laterality:  Both sides at the same time  Location:  Top of head  Changes in thinking and mood:  None  Changes in vision:  None  Stomach/GI changes:  Stomach pain     He had lower extremity weakness: 3/2022: work up at [facility] with neurology negative.     Lexapro: fatigue, took away anxiety.  Stopped due to side effects.      Therapist: one year, helped. Stopped when he thought everything was better.    Now seeing Demarco Vásquez.     He is very active.  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     States he drinks more sodas than water: sprite     I have reviewed patient's medical history, any new submitted information provided by patient or medical assistant and updated medical record.      Objective:      Physical Exam  Constitutional:       Appearance: Normal appearance.   Eyes:      Conjunctiva/sclera: Conjunctivae normal.      Pupils: Pupils are equal,  "round, and reactive to light.   Cardiovascular:      Rate and Rhythm: Normal rate and regular rhythm.      Pulses: Normal pulses.      Heart sounds: Normal heart sounds.   Pulmonary:      Effort: Pulmonary effort is normal.      Breath sounds: Normal breath sounds.   Musculoskeletal:         General: Normal range of motion.      Right lower leg: No edema.      Left lower leg: No edema.   Skin:     General: Skin is warm and dry.   Neurological:      General: No focal deficit present.      Mental Status: He is alert and oriented to person, place, and time.      Cranial Nerves: No cranial nerve deficit.      Sensory: No sensory deficit.   Psychiatric:         Mood and Affect: Mood normal.         Behavior: Behavior normal.         Thought Content: Thought content normal.         Judgment: Judgment normal.        Result Review  Data Reviewed:{ Labs  Result Review  Imaging  Med Tab  Media :23}     The following data was reviewed by: Charito Avila III, NP-C on 05/23/2022  Common labs    Common Labsle 3/30/22 3/30/22 3/31/22 3/31/22 4/19/22 4/19/22    1613 1613 1506 1506 2318 2318   Glucose  88 91   103 (A)   BUN  15 14   17   Creatinine  0.84 1.13   0.84   Sodium  138 137   142   Potassium  4.0 3.9   3.3 (A)   Chloride  103 102   105   Calcium  9.3 9.8   9.6   Albumin  4.60       Total Bilirubin  0.7       Alkaline Phosphatase  97       AST (SGOT)  15       ALT (SGPT)  13       WBC 4.40   9.38 7.40    Hemoglobin 16.4   17.6 16.3    Hematocrit 46.4   48.2 45.8    Platelets 145   213 187    (A) Abnormal value                 ER notes reviewed.     Recent labs: mag 2, B12 340, folate 19.9, hgb 16.3       Vital Signs:   /64 (BP Location: Left arm, Patient Position: Sitting, Cuff Size: Adult)   Pulse 79   Temp 98.6 °F (37 °C) (Temporal)   Ht 180.3 cm (71\")   Wt 67.9 kg (149 lb 9.6 oz)   SpO2 99%   BMI 20.86 kg/m²         Requested Prescriptions     Signed Prescriptions Disp Refills   • " amitriptyline (ELAVIL) 25 MG tablet 30 tablet 0     Sig: Take 1 tablet by mouth Every Night.   • tiZANidine (ZANAFLEX) 2 MG tablet 30 tablet 0     Sig: Take 1 tablet by mouth Every 8 (Eight) Hours As Needed for Muscle Spasms (headache).       Routine medications provided by this office will also be refilled via pharmacy request.       Current Outpatient Medications:   •  DICYCLOMINE HCL PO, , Disp: , Rfl:   •  hydrOXYzine (ATARAX) 25 MG tablet, Take 25 mg by mouth Every 6 (Six) Hours As Needed for Itching., Disp: , Rfl:   •  Multiple Vitamin (MULTIVITAMIN PO), Take  by mouth., Disp: , Rfl:   •  amitriptyline (ELAVIL) 25 MG tablet, Take 1 tablet by mouth Every Night., Disp: 30 tablet, Rfl: 0  •  tiZANidine (ZANAFLEX) 2 MG tablet, Take 1 tablet by mouth Every 8 (Eight) Hours As Needed for Muscle Spasms (headache)., Disp: 30 tablet, Rfl: 0     Assessment and Plan:      Assessment and Plan {CC Problem List  Visit Diagnosis  ROS  Review (Popup)  [facility] Maintenance  Quality  BestPractice  Medications  SmartSets  SnapShot Encounters  Media :23}     Problem List Items Addressed This Visit    None     Visit Diagnoses     Tension headache    -  Primary    Relevant Medications    amitriptyline (ELAVIL) 25 MG tablet    tiZANidine (ZANAFLEX) 2 MG tablet          Will start him on elavil nightly.  Advised he could feel sleep the first few days.     Zanaflex as needed when he has increased muscular tension.     I discussed medication use, dosing and possible side effects.   Patient was given opportunity to ask any questions.     Plenty of fluids.     I spent 25 minutes caring for Wilber on this date of service. This time includes time spent by me in the following activities: preparing for the visit, reviewing tests, obtaining and/or reviewing a separately obtained history, performing a medically appropriate examination and/or evaluation, counseling and educating the patient/family/caregiver, ordering medications,  tests, or procedures, referring and communicating with other health care professionals and documenting information in the medical record    Follow Up {Instructions Charge Capture  Follow-up Communications :23}     Return in about 4 weeks (around 6/20/2022) for Annual physical.      Patient was given instructions and counseling regarding his condition or for health maintenance advice. Please see specific information pulled into the AVS if appropriate.    Dragon disclaimer:   Much of this encounter note is an electronic transcription/translation of spoken language to printed text. The electronic translation of spoken language may permit erroneous, or at times, nonsensical words or phrases to be inadvertently transcribed; Although I have reviewed the note for such errors, some may still exist.     Additional Patient Counseling:       Patient Instructions   Diet:    • Eat vegetables, fruits, whole grain, low-fat dairy, poultry, fish, beans, nontropical vegetable oils, and nuts, but avoid red meat (i.e., Mediterranean-style diet, DASH [Dietary Approaches to Stop Hypertension] diet).  • Limit sugary drinks and sweets.  • Limit saturated and trans fat to 5% to 6% of calories.  • Limit sodium intake to 2,400 mg daily (about one teaspoon table salt [kosher/sea salt have less sodium per teaspoon]).  Weight loss / Calorie Counting Apps:    • Lose It!   • MyFitness Pal   • Works great when you try it with a partner/ friend  Exercise:   • Engage in moderate-to-vigorous aerobic activity for at least 40 minutes (on average) three to four times each week.  Wearables:   • Activity tracker   • Step tracker   Skin Care:   • Use sun screen SPF >30 daily  • Dermatologist for skin check regularly  Bone Health:   • [email or web address]    CDC recommends Flu vaccines for everyone 6 months and older every season with rare exceptions.

## 2022-05-23 NOTE — PATIENT INSTRUCTIONS
Diet:    Eat vegetables, fruits, whole grain, low-fat dairy, poultry, fish, beans, nontropical vegetable oils, and nuts, but avoid red meat (i.e., Mediterranean-style diet, DASH [Dietary Approaches to Stop Hypertension] diet).  Limit sugary drinks and sweets.  Limit saturated and trans fat to 5% to 6% of calories.  Limit sodium intake to 2,400 mg daily (about one teaspoon table salt [kosher/sea salt have less sodium per teaspoon]).  Weight loss / Calorie Counting Apps:    Lose It!   [employer] Pal   Works great when you try it with a partner/ friend  Exercise:   Engage in moderate-to-vigorous aerobic activity for at least 40 minutes (on average) three to four times each week.  Wearables:   Activity tracker   Step tracker   Skin Care:   Use sun screen SPF >30 daily  Dermatologist for skin check regularly  Bone Health:   [email or web address]    CDC recommends Flu vaccines for everyone 6 months and older every season with rare exceptions.

## 2022-06-16 RX ORDER — ONDANSETRON 4 MG/1
4 TABLET, FILM COATED ORAL EVERY 8 HOURS PRN
Qty: 10 TABLET | Refills: 0 | Status: SHIPPED | OUTPATIENT
Start: 2022-06-16 | End: 2022-06-23

## 2022-06-23 ENCOUNTER — OFFICE VISIT (OUTPATIENT)
Dept: INTERNAL MEDICINE | Facility: CLINIC | Age: 22
End: 2022-06-23

## 2022-06-23 VITALS
BODY MASS INDEX: 21.17 KG/M2 | SYSTOLIC BLOOD PRESSURE: 111 MMHG | TEMPERATURE: 97.8 F | DIASTOLIC BLOOD PRESSURE: 80 MMHG | OXYGEN SATURATION: 99 % | WEIGHT: 151.2 LBS | HEART RATE: 58 BPM | HEIGHT: 71 IN

## 2022-06-23 DIAGNOSIS — Z13.6 SCREENING FOR HYPERTENSION: ICD-10-CM

## 2022-06-23 DIAGNOSIS — Z00.00 ANNUAL PHYSICAL EXAM: Primary | ICD-10-CM

## 2022-06-23 PROBLEM — R10.10 PAIN OF UPPER ABDOMEN: Status: RESOLVED | Noted: 2020-07-08 | Resolved: 2022-06-23

## 2022-06-23 PROCEDURE — 3008F BODY MASS INDEX DOCD: CPT | Performed by: NURSE PRACTITIONER

## 2022-06-23 PROCEDURE — 2014F MENTAL STATUS ASSESS: CPT | Performed by: NURSE PRACTITIONER

## 2022-06-23 PROCEDURE — 99395 PREV VISIT EST AGE 18-39: CPT | Performed by: NURSE PRACTITIONER

## 2022-06-23 RX ORDER — HYDROXYZINE PAMOATE 25 MG/1
25-50 CAPSULE ORAL SEE ADMIN INSTRUCTIONS
COMMUNITY
Start: 2022-06-09 | End: 2022-06-23

## 2022-06-23 NOTE — PATIENT INSTRUCTIONS
Summer Health Information:     Stay hydrated when outside  If your urine starts to get darker, you are not drinking enough     Protect yourself from ticks and mosquitos  Here are the best ingredients to look for:  DEET (N,N-diethyl-m-toluamide or N,N-diethyl-3-methyl-benzamide)  Picaridin  Oil of lemon eucalyptus (p-menthane-3,8-diol or PMD)  Wear light-colored pants so you can spot ticks easier  If you use a permethrin product, ONLY apply to clothing    Practice Safe Sun!    Use sun screen SPF >50 daily, reapply regularly per directions on package  See dermatologist for skin check regularly  Protect your eyes with sunglasses with UV protection    Poison Ivy  If you are going into areas that may have poison ivy, prepare with a product like IvyX or other ivy blocker.  Wash your clothes and pets after being in an area with ivy when returning home. If you come in contact with poison ivy, try a product like Technu     Other things you should incorporate all year...     Diet:    Eat vegetables, fruits, whole grain, low-fat dairy, poultry, fish, beans, nontropical vegetable oils, and nuts, but avoid red meat (i.e., Mediterranean-style diet, DASH [Dietary Approaches to Stop Hypertension] diet).  Limit sugary drinks and sweets.  Limit saturated and trans fat to 5% to 6% of calories.  Limit sodium intake to 2,400 mg daily (about one teaspoon table salt [kosher/sea salt have less sodium per teaspoon]).    Weight loss / Calorie Counting Apps:    Lose It!   [employer] Pal   Works great when you try it with a partner/ friend. It takes about 15 minutes a day but studies show that this simple method of monitoring your intake can help you achieve goals as it keeps you accountable.  I often will ask patients to try these apps just to get an idea of how much sodium and how many carbohydrates you are taking in.   Exercise:   Engage in moderate-to-vigorous aerobic activity for at least 40 minutes (on average) three to four times each  week.  Wearables:   Activity tracker   Step tracker: getting 7,500 steps daily can cut your cardiac risks by 44%   Bone Health:   [email or web address]  Routine weight bearing exercise

## 2022-06-23 NOTE — PROGRESS NOTES
"        Chief Complaint  Annual Exam     Subjective:      History of Present Illness {CC  Problem List  Visit  Diagnosis   Encounters  Notes  Medications  Labs  Result Review Imaging  Media :23}     Wliber Hopkins presents to [facility] for:  Annual exam    He has no complaints.  \"Everything is good\"    He remains active:   Gym every mornin hour  Basketball    Diet: in general is healthy.  Does not eat breakfast    Takes multivitamin daily.     No family hx prostate, colon or testicular cancer.   He is not sexually active.     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     Vision: no changes.     Wilber is here for coordination of medical care, to discuss health maintenance, disease prevention as well as to follow up on medical problems.     Patient Care Team:  Charito Avila III NP-C as PCP - General (Internal Medicine)      Health and Weight:   Weight trend is   Wt Readings from Last 4 Encounters:   22 68.6 kg (151 lb 3.2 oz)   22 67.9 kg (149 lb 9.6 oz)   22 61.2 kg (135 lb)   22 61.2 kg (135 lb)         Blood Pressure:   BP Readings from Last 3 Encounters:   22 111/80   22 120/64   22 117/62        Cholesterol Screen:   Most men start screen at 35, if you have family history or comorbidities it may be screen earlier.     Colon Cancer Screen:   Family history: [x] None    [] Yes:     Genital/ Urinary Health:   · He voids without difficulty.  · He is not sexually active.      STI Screen:   [] HIV     [] Syphilis   [] Chlamydia/ Gonorrhea   [x] Declines STD screen  If tests ordered, patient was informed HIV results will not show up on my chart.     Testicular cancer Screen:   Testicular self exams recommended once a month.   Advised that any firm testicular nodules to be reported immediately.    Lung Cancer Screen:   [x] Nonsmoker  [] History of smoking  []     Skin " Cancer:   Regular Sunsceen: Advised  Routine self assessment of your skin, report any changes.     I have reviewed patient's medical history, any new submitted information provided by patient or medical assistant and updated medical record.      Objective:      Physical Exam  Vitals reviewed.   Constitutional:       Appearance: Normal appearance. He is well-developed.   HENT:      Head: Normocephalic and atraumatic.      Right Ear: External ear normal.      Left Ear: External ear normal.      Nose: Nose normal.   Eyes:      Conjunctiva/sclera: Conjunctivae normal.      Pupils: Pupils are equal, round, and reactive to light.   Neck:      Thyroid: No thyromegaly.      Vascular: No JVD.   Cardiovascular:      Rate and Rhythm: Normal rate and regular rhythm.      Pulses: Normal pulses.           Radial pulses are 2+ on the right side and 2+ on the left side.      Heart sounds: Normal heart sounds, S1 normal and S2 normal. No murmur heard.    No friction rub. No gallop.   Pulmonary:      Effort: Pulmonary effort is normal.      Breath sounds: Normal breath sounds.   Chest:      Chest wall: No deformity.   Abdominal:      General: Bowel sounds are normal.      Palpations: Abdomen is soft.      Tenderness: There is no abdominal tenderness. Negative signs include Torrez's sign.      Hernia: No hernia is present. There is no hernia in the left inguinal area or right inguinal area.   Genitourinary:     Penis: Normal and circumcised.       Testes: Normal. Cremasteric reflex is present.   Musculoskeletal:         General: Normal range of motion.      Cervical back: Normal range of motion and neck supple.      Right lower leg: No edema.      Left lower leg: No edema.   Lymphadenopathy:      Cervical: No cervical adenopathy.   Skin:     General: Skin is warm and dry.      Capillary Refill: Capillary refill takes 2 to 3 seconds.      Nails: There is no clubbing.          Neurological:      General: No focal deficit present.       "Mental Status: He is alert and oriented to person, place, and time.      Cranial Nerves: No cranial nerve deficit.      Sensory: No sensory deficit.      Motor: Motor function is intact.   Psychiatric:         Mood and Affect: Mood normal.         Speech: Speech normal.         Behavior: Behavior normal. Behavior is cooperative.         Thought Content: Thought content normal.         Judgment: Judgment normal.        Result Review  Data Reviewed:{ Labs  Result Review  Imaging  Med Tab  Media :23}                Vital Signs:   /80 (BP Location: Left arm, Patient Position: Sitting, Cuff Size: Adult)   Pulse 58   Temp 97.8 °F (36.6 °C) (Temporal)   Ht 180.3 cm (71\")   Wt 68.6 kg (151 lb 3.2 oz)   SpO2 99%   BMI 21.09 kg/m²         Requested Prescriptions      No prescriptions requested or ordered in this encounter       Routine medications provided by this office will also be refilled via pharmacy request.       Current Outpatient Medications:   •  DICYCLOMINE HCL PO, , Disp: , Rfl:   •  hydrOXYzine (ATARAX) 25 MG tablet, Take 25 mg by mouth Every 6 (Six) Hours As Needed for Itching., Disp: , Rfl:   •  Multiple Vitamin (MULTIVITAMIN PO), Take  by mouth., Disp: , Rfl:      Assessment and Plan:      Assessment and Plan {CC Problem List  Visit Diagnosis  ROS  Review (Popup)  Health Maintenance  Quality  BestPractice  Medications  SmartSets  SnapShot Encounters  Media :23}     Problem List Items Addressed This Visit    None     Visit Diagnoses     Annual physical exam    -  Primary    Relevant Orders    Comprehensive Metabolic Panel    CBC (No Diff)    Screening for hypertension        BP controlled: continue low sodium diet, routine exercise.           Follow Up {Instructions Charge Capture  Follow-up Communications :23}     Return in about 1 year (around 6/23/2023) for Annual physical.      Patient was given instructions and counseling regarding his condition or for health maintenance " advice. Please see specific information pulled into the AVS if appropriate.    Steven disclaimer:   Much of this encounter note is an electronic transcription/translation of spoken language to printed text. The electronic translation of spoken language may permit erroneous, or at times, nonsensical words or phrases to be inadvertently transcribed; Although I have reviewed the note for such errors, some may still exist.     Additional Patient Counseling:       Patient Instructions     Summer Health Information:     Stay hydrated when outside  If your urine starts to get darker, you are not drinking enough     Protect yourself from ticks and mosquitos  Here are the best ingredients to look for:  · DEET (N,N-diethyl-m-toluamide or N,N-diethyl-3-methyl-benzamide)  · Picaridin  · Oil of lemon eucalyptus (p-menthane-3,8-diol or PMD)  Wear light-colored pants so you can spot ticks easier  If you use a permethrin product, ONLY apply to clothing    Practice Safe Sun!    · Use sun screen SPF >50 daily, reapply regularly per directions on package  · See dermatologist for skin check regularly  · Protect your eyes with sunglasses with UV protection    Poison Ivy  If you are going into areas that may have poison ivy, prepare with a product like IvyX or other ivy blocker.  Wash your clothes and pets after being in an area with ivy when returning home. If you come in contact with poison ivy, try a product like Technu     Other things you should incorporate all year...     Diet:    • Eat vegetables, fruits, whole grain, low-fat dairy, poultry, fish, beans, nontropical vegetable oils, and nuts, but avoid red meat (i.e., Mediterranean-style diet, DASH [Dietary Approaches to Stop Hypertension] diet).  • Limit sugary drinks and sweets.  • Limit saturated and trans fat to 5% to 6% of calories.  • Limit sodium intake to 2,400 mg daily (about one teaspoon table salt [kosher/sea salt have less sodium per teaspoon]).    Weight loss / Calorie  Counting Apps:    • Lose It!   • MyFitness Pal   • Works great when you try it with a partner/ friend. It takes about 15 minutes a day but studies show that this simple method of monitoring your intake can help you achieve goals as it keeps you accountable.  I often will ask patients to try these apps just to get an idea of how much sodium and how many carbohydrates you are taking in.   Exercise:   • Engage in moderate-to-vigorous aerobic activity for at least 40 minutes (on average) three to four times each week.  Wearables:   • Activity tracker   • Step tracker: getting 7,500 steps daily can cut your cardiac risks by 44%   Bone Health:   • [email or web address]  • Routine weight bearing exercise

## 2022-06-24 LAB
ALBUMIN SERPL-MCNC: 4.6 G/DL (ref 4.1–5.2)
ALBUMIN/GLOB SERPL: 2.3 {RATIO} (ref 1.2–2.2)
ALP SERPL-CCNC: 90 IU/L (ref 44–121)
ALT SERPL-CCNC: 30 IU/L (ref 0–44)
AST SERPL-CCNC: 24 IU/L (ref 0–40)
BILIRUB SERPL-MCNC: 0.7 MG/DL (ref 0–1.2)
BUN SERPL-MCNC: 17 MG/DL (ref 6–20)
BUN/CREAT SERPL: 19 (ref 9–20)
CALCIUM SERPL-MCNC: 9.6 MG/DL (ref 8.7–10.2)
CHLORIDE SERPL-SCNC: 102 MMOL/L (ref 96–106)
CO2 SERPL-SCNC: 26 MMOL/L (ref 20–29)
CREAT SERPL-MCNC: 0.9 MG/DL (ref 0.76–1.27)
EGFRCR SERPLBLD CKD-EPI 2021: 125 ML/MIN/1.73
ERYTHROCYTE [DISTWIDTH] IN BLOOD BY AUTOMATED COUNT: 12.1 % (ref 11.6–15.4)
GLOBULIN SER CALC-MCNC: 2 G/DL (ref 1.5–4.5)
GLUCOSE SERPL-MCNC: 102 MG/DL (ref 65–99)
HCT VFR BLD AUTO: 48 % (ref 37.5–51)
HGB BLD-MCNC: 16.5 G/DL (ref 13–17.7)
MCH RBC QN AUTO: 32.2 PG (ref 26.6–33)
MCHC RBC AUTO-ENTMCNC: 34.4 G/DL (ref 31.5–35.7)
MCV RBC AUTO: 94 FL (ref 79–97)
PLATELET # BLD AUTO: 173 X10E3/UL (ref 150–450)
POTASSIUM SERPL-SCNC: 4.3 MMOL/L (ref 3.5–5.2)
PROT SERPL-MCNC: 6.6 G/DL (ref 6–8.5)
RBC # BLD AUTO: 5.13 X10E6/UL (ref 4.14–5.8)
SODIUM SERPL-SCNC: 140 MMOL/L (ref 134–144)
WBC # BLD AUTO: 5.2 X10E3/UL (ref 3.4–10.8)

## 2022-11-22 ENCOUNTER — OFFICE VISIT (OUTPATIENT)
Dept: INTERNAL MEDICINE | Facility: CLINIC | Age: 22
End: 2022-11-22

## 2022-11-22 VITALS
HEIGHT: 71 IN | TEMPERATURE: 98.3 F | HEART RATE: 73 BPM | BODY MASS INDEX: 19.88 KG/M2 | OXYGEN SATURATION: 98 % | SYSTOLIC BLOOD PRESSURE: 132 MMHG | WEIGHT: 142 LBS | DIASTOLIC BLOOD PRESSURE: 70 MMHG

## 2022-11-22 DIAGNOSIS — J06.9 UPPER RESPIRATORY TRACT INFECTION, UNSPECIFIED TYPE: ICD-10-CM

## 2022-11-22 DIAGNOSIS — J02.9 SORE THROAT: Primary | ICD-10-CM

## 2022-11-22 LAB
EXPIRATION DATE: NORMAL
EXPIRATION DATE: NORMAL
FLUAV AG UPPER RESP QL IA.RAPID: NOT DETECTED
FLUBV AG UPPER RESP QL IA.RAPID: NOT DETECTED
INTERNAL CONTROL: NORMAL
INTERNAL CONTROL: NORMAL
Lab: NORMAL
Lab: NORMAL
S PYO AG THROAT QL: NEGATIVE
SARS-COV-2 AG UPPER RESP QL IA.RAPID: NOT DETECTED

## 2022-11-22 PROCEDURE — 99213 OFFICE O/P EST LOW 20 MIN: CPT | Performed by: FAMILY MEDICINE

## 2022-11-22 PROCEDURE — 87428 SARSCOV & INF VIR A&B AG IA: CPT | Performed by: FAMILY MEDICINE

## 2022-11-22 PROCEDURE — 87880 STREP A ASSAY W/OPTIC: CPT | Performed by: FAMILY MEDICINE

## 2022-11-22 RX ORDER — BUPROPION HYDROCHLORIDE 150 MG/1
150 TABLET ORAL EVERY MORNING
COMMUNITY
Start: 2022-11-10 | End: 2023-01-16

## 2022-11-22 RX ORDER — FLUTICASONE PROPIONATE 50 MCG
2 SPRAY, SUSPENSION (ML) NASAL DAILY
Qty: 16 G | Refills: 0 | Status: SHIPPED | OUTPATIENT
Start: 2022-11-22 | End: 2023-01-16

## 2022-11-22 NOTE — PROGRESS NOTES
"    Chief Complaint  Sore Throat and URI (Chest congestion)    Subjective    History of Present Illness {CC  Problem List  Visit  Diagnosis   Encounters  Notes  Medications  Labs  Result Review Imaging  Media :23}     Wilber Hopkins presents to [facility] for   History of Present Illness     21 yo male present for acute visit. States he currently has sore through and congestion.  He started having symptoms about 4 days ago.  He has runny nose, sore throat, chest congestion and a little cough.  No fever or chills.   She has taken robitussin for the cough and benadryl.    Started feeling hoarse yesterday.            Social History     Socioeconomic History   • Marital status: Single   Tobacco Use   • Smoking status: Never   • Smokeless tobacco: Never   Vaping Use   • Vaping Use: Never used   Substance and Sexual Activity   • Alcohol use: No   • Drug use: No   • Sexual activity: Never      Objective     Vital Signs:   /70   Pulse 73   Temp 98.3 °F (36.8 °C)   Ht 180.3 cm (71\")   Wt 64.4 kg (142 lb)   SpO2 98%   BMI 19.80 kg/m²   Physical Exam  Constitutional:       Appearance: He is not ill-appearing.   HENT:      Head: Normocephalic.      Right Ear: Tympanic membrane normal.      Left Ear: Tympanic membrane normal.      Mouth/Throat:      Mouth: Mucous membranes are moist.      Pharynx: Oropharynx is clear. No oropharyngeal exudate.      Comments: Min erythema  Cardiovascular:      Rate and Rhythm: Regular rhythm.      Heart sounds: Normal heart sounds.   Pulmonary:      Effort: No respiratory distress.      Breath sounds: Normal breath sounds. No wheezing.   Neurological:      Mental Status: He is alert.        Result Review  Data Reviewed:{ Labs  Result Review  Imaging  Med Tab  Media :23}   The following data was reviewed by: Ernestina Hammer MD on 11/22/2022  Lab Results - Last 18 Months   Lab Units 06/23/22  0844 04/19/22  2318 03/31/22  1506 " 03/30/22  1613 07/25/21  1021 06/15/21  0915   BUN mg/dL 17 17 14 15 13 16   CREATININE mg/dL 0.90 0.84 1.13 0.84 0.87 0.73*   EGFR IF NONAFRICN AM mL/min/1.73  --   --   --   --  112 137   SODIUM mmol/L 140 142 137 138 142 144   POTASSIUM mmol/L 4.3 3.3* 3.9 4.0 4.1 4.1   CHLORIDE mmol/L 102 105 102 103 105 107   CALCIUM mg/dL 9.6 9.6 9.8 9.3 9.3 9.2   ALBUMIN g/dL 4.6  --   --  4.60  --  4.80   BILIRUBIN mg/dL 0.7  --   --  0.7  --  0.5   ALK PHOS IU/L 90  --   --  97  --  128*   AST (SGOT) IU/L 24  --   --  15  --  28   ALT (SGPT) IU/L 30  --   --  13  --  21   WBC x10E3/uL 5.2 7.40 9.38 4.40 6.54 5.09   RBC x10E6/uL 5.13 4.94 5.31 5.08 4.70 5.09   HEMATOCRIT % 48.0 45.8 48.2 46.4 43.2 47.0   MCV fL 94 92.7 90.8 91.3 91.9 92.3   MCH pg 32.2 33.0 33.1* 32.3 31.5 32.8   TSH uIU/mL  --   --  3.060  --   --   --    FREE T4 ng/dL  --   --  1.47  --   --   --               Current Outpatient Medications:   •  buPROPion XL (WELLBUTRIN XL) 150 MG 24 hr tablet, Take 150 mg by mouth Every Morning., Disp: , Rfl:   •  hydrOXYzine (ATARAX) 25 MG tablet, Take 25 mg by mouth Every 6 (Six) Hours As Needed for Itching., Disp: , Rfl:   •  Multiple Vitamin (MULTIVITAMIN PO), Take  by mouth., Disp: , Rfl:   •  DICYCLOMINE HCL PO, , Disp: , Rfl:   •  fluticasone (Flonase) 50 MCG/ACT nasal spray, 2 sprays into the nostril(s) as directed by provider Daily., Disp: 16 g, Rfl: 0      Assessment and Plan {CC Problem List  Visit Diagnosis  ROS  Review (Popup)  Health Maintenance  Quality  BestPractice  Medications  SmartSets  SnapShot Encounters  Media :23}   Problem List Items Addressed This Visit    None  Visit Diagnoses     Sore throat    -  Primary    Upper respiratory tract infection, unspecified type        Relevant Medications    fluticasone (Flonase) 50 MCG/ACT nasal spray        covid and flu negative   Strept negative    Advise to start dayquil and nyquil  Given flonase take daily for the next few weeks.   Supportive  care   Seek medical attention if symptoms do not improve or worsen.         Follow Up   Return if symptoms worsen or fail to improve.  Patient was given instructions and counseling regarding his condition or for health maintenance advice. Please see specific information pulled into the AVS if appropriate.    EpicAct:MR_[email or web address]_ORDERS,RunParams:STARTUPTYPE=FOLLOW    MR_WS_AMB_DISCHARGE

## 2023-01-16 ENCOUNTER — OFFICE VISIT (OUTPATIENT)
Dept: GASTROENTEROLOGY | Facility: CLINIC | Age: 23
End: 2023-01-16
Payer: COMMERCIAL

## 2023-01-16 ENCOUNTER — TELEPHONE (OUTPATIENT)
Dept: GASTROENTEROLOGY | Facility: CLINIC | Age: 23
End: 2023-01-16

## 2023-01-16 VITALS
TEMPERATURE: 98.2 F | DIASTOLIC BLOOD PRESSURE: 63 MMHG | HEART RATE: 81 BPM | HEIGHT: 71 IN | SYSTOLIC BLOOD PRESSURE: 100 MMHG | OXYGEN SATURATION: 98 % | BODY MASS INDEX: 19.85 KG/M2 | WEIGHT: 141.8 LBS

## 2023-01-16 DIAGNOSIS — R10.30 LOWER ABDOMINAL PAIN: ICD-10-CM

## 2023-01-16 DIAGNOSIS — K21.9 GASTROESOPHAGEAL REFLUX DISEASE WITHOUT ESOPHAGITIS: ICD-10-CM

## 2023-01-16 DIAGNOSIS — R19.7 DIARRHEA, UNSPECIFIED TYPE: Primary | ICD-10-CM

## 2023-01-16 PROCEDURE — 99214 OFFICE O/P EST MOD 30 MIN: CPT | Performed by: NURSE PRACTITIONER

## 2023-01-16 RX ORDER — BUPROPION HYDROCHLORIDE 150 MG/1
150 TABLET ORAL DAILY
COMMUNITY

## 2023-01-16 RX ORDER — DICYCLOMINE HCL 20 MG
20 TABLET ORAL 3 TIMES DAILY PRN
Qty: 90 TABLET | Refills: 1 | Status: SHIPPED | OUTPATIENT
Start: 2023-01-16 | End: 2023-02-06 | Stop reason: SDUPTHER

## 2023-01-16 NOTE — PROGRESS NOTES
Chief Complaint   Patient presents with   • Abdominal Pain   • Diarrhea   • Rectal Pain   • Nausea       Wilber Hopkins is a  22 y.o. male here for a follow up visit for abdominal pain.    HPI  22-year-old male presents today for follow-up visit for abdominal pain.  He is a patient of Dr. Aly.  He was last seen in the office by me on 3/4/2021.  He reports the whole year of 2022 he really felt pretty good.  He felt like his bowels are moving well and his reflux was well controlled.  But he tells me for the last several months he feels like his symptoms are starting to creep back.  He is having more abdominal pain and cramping and more rectal pain when having a bowel movement.  He tells me his symptoms do improve with a bowel movement.  He does feel like that his GERD is not as bad as it was but he also takes omeprazole 20 mg daily as needed.  He admits he does not have to take it every day.  He did run out of bentyl which is what he used to use for his abdominal pain and cramping.  So he thinks he might need to get back on that.  He had a CT scan of the abdomen and pelvis done in March of last year that was negative for any acute abdominal process.  His last EGD and colonoscopy was on 7/2020.  H. pylori testing was negative, small bowel follow-through was negative, gallbladder ultrasound and HIDA scan were both normal.  He tells me currently he is eating about 1-2 meals a day.  He does admit that stress and anxiety tend to make his symptoms worse.  He is not on any fiber or probiotics.  He denies any dysphagia, reflux, nausea and vomiting, diarrhea, rectal bleeding or melena.  He admits his appetite is okay and his weight is down 10 pounds since June of last year.  He denies any significant GI family history at this time.  Past Medical History:   Diagnosis Date   • Anxiety    • Depression    • Gastritis    • GERD (gastroesophageal reflux disease)    • OCD (obsessive compulsive disorder)    • Panic attack    •  PTSD (post-traumatic stress disorder)        Past Surgical History:   Procedure Laterality Date   • COLONOSCOPY N/A 7/18/2020    Procedure: COLONOSCOPY to cecum and t.i. with biopsies;  Surgeon: Tong Aly MD;  Location: [facility] ENDOSCOPY;  Service: Gastroenterology;  Laterality: N/A;  pre-  abd pain, n/v, diarrhea  post- normal   • ENDOSCOPY N/A 7/18/2020    Procedure: ESOPHAGOGASTRODUODENOSCOPY with biopsies;  Surgeon: Tong Aly MD;  Location: [facility] ENDOSCOPY;  Service: Gastroenterology;  Laterality: N/A;  pre- abd pain, n/v, diarrhea  post- gastritis       Scheduled Meds:    Continuous Infusions:No current facility-administered medications for this visit.      PRN Meds:.    Allergies   Allergen Reactions   • Amoxicillin GI Intolerance   • Penicillins GI Intolerance       Social History     Socioeconomic History   • Marital status: Single   Tobacco Use   • Smoking status: Never   • Smokeless tobacco: Never   Vaping Use   • Vaping Use: Never used   Substance and Sexual Activity   • Alcohol use: No   • Drug use: No   • Sexual activity: Never       Family History   Problem Relation Age of Onset   • Anxiety disorder Mother    • Mental illness Father         Brain Damage causing mental issues   • Prostate cancer Neg Hx    • Colon cancer Neg Hx    • Breast cancer Neg Hx        Review of Systems   Constitutional: Negative for appetite change, chills, diaphoresis, fatigue, fever and unexpected weight change.   HENT: Negative for nosebleeds, postnasal drip, sore throat, trouble swallowing and voice change.    Respiratory: Negative for cough, choking, chest tightness, shortness of breath, wheezing and stridor.    Cardiovascular: Negative for chest pain, palpitations and leg swelling.   Gastrointestinal: Positive for abdominal distention, abdominal pain and rectal pain. Negative for anal bleeding, blood in stool, constipation, diarrhea, nausea and vomiting.   Endocrine: Negative for polydipsia, polyphagia and  polyuria.   Musculoskeletal: Negative for gait problem.   Skin: Negative for rash and wound.   Allergic/Immunologic: Negative for food allergies.   Neurological: Negative for dizziness, speech difficulty and light-headedness.   Psychiatric/Behavioral: Negative for confusion, self-injury, sleep disturbance and suicidal ideas.       Vitals:    01/16/23 0834   BP: 100/63   Pulse: 81   Temp: 98.2 °F (36.8 °C)   SpO2: 98%       Physical Exam  Constitutional:       General: He is not in acute distress.     Appearance: He is well-developed. He is not ill-appearing.   HENT:      Head: Normocephalic.   Eyes:      Pupils: Pupils are equal, round, and reactive to light.   Cardiovascular:      Rate and Rhythm: Normal rate and regular rhythm.      Heart sounds: Normal heart sounds.   Pulmonary:      Effort: Pulmonary effort is normal.      Breath sounds: Normal breath sounds.   Abdominal:      General: Bowel sounds are normal. There is no distension.      Palpations: Abdomen is soft. There is no mass.      Tenderness: There is no abdominal tenderness. There is no guarding or rebound.      Hernia: No hernia is present.   Musculoskeletal:         General: Normal range of motion.   Skin:     General: Skin is warm and dry.   Neurological:      Mental Status: He is alert and oriented to person, place, and time.   Psychiatric:         Speech: Speech normal.         Behavior: Behavior normal.         Judgment: Judgment normal.         No radiology results for the last 7 days     Diagnoses and all orders for this visit:    1. Diarrhea, unspecified type (Primary)  Overview:  Added automatically from request for surgery [id number]      2. Lower abdominal pain    3. Gastroesophageal reflux disease without esophagitis  Overview:  Added automatically from request for surgery [id number]      Other orders  -     dicyclomine (BENTYL) 20 MG tablet; Take 1 tablet by mouth 3 (Three) Times a Day As Needed (abdominal pain and or cramping).  Dispense: 90  tablet; Refill: 1     GERD seems well controlled on omeprazole 20 mg daily OTC as needed.  Continue GERD precautions.  Sounds like his IBS symptoms have returned.  I would like him to resume bentyl as needed.  I would like him to start a daily probiotic.  Continue a high-fiber diet.  Patient to work on stress management.  Patient to call the office next week with an update.  Patient to follow-up with me in 3 weeks.  Patient is agreeable to the plan.

## 2023-01-17 NOTE — TELEPHONE ENCOUNTER
Called pt and spoke with grandmother and she advised she was able to find the florastor. She is asking how long should he take the probiotics. Advised will send message to Rehana KRAUSE.

## 2023-01-17 NOTE — TELEPHONE ENCOUNTER
I would have him take the probiotics daily for the next 6 months.  Thanks Rehana KRAUSE.    Called pt's grandmother and advised of the above. Verb understanding.

## 2023-01-24 ENCOUNTER — OFFICE VISIT (OUTPATIENT)
Dept: INTERNAL MEDICINE | Facility: CLINIC | Age: 23
End: 2023-01-24
Payer: COMMERCIAL

## 2023-01-24 VITALS
WEIGHT: 143.2 LBS | DIASTOLIC BLOOD PRESSURE: 68 MMHG | SYSTOLIC BLOOD PRESSURE: 120 MMHG | HEIGHT: 71 IN | HEART RATE: 59 BPM | TEMPERATURE: 98.6 F | OXYGEN SATURATION: 100 % | BODY MASS INDEX: 20.05 KG/M2

## 2023-01-24 DIAGNOSIS — F41.8 OTHER SPECIFIED ANXIETY DISORDERS: Primary | ICD-10-CM

## 2023-01-24 PROBLEM — K58.0 IRRITABLE BOWEL SYNDROME WITH DIARRHEA: Status: ACTIVE | Noted: 2023-01-24

## 2023-01-24 PROCEDURE — 99213 OFFICE O/P EST LOW 20 MIN: CPT | Performed by: NURSE PRACTITIONER

## 2023-01-24 RX ORDER — NORTRIPTYLINE HYDROCHLORIDE 25 MG/1
25 CAPSULE ORAL NIGHTLY
Qty: 30 CAPSULE | Refills: 1 | Status: SHIPPED | OUTPATIENT
Start: 2023-01-24 | End: 2023-02-21 | Stop reason: SDUPTHER

## 2023-01-24 NOTE — PROGRESS NOTES
Chief Complaint  Headache and Nausea (Patient presents here today for Headache and Nausea for the last 2 months. )     Subjective:      History of Present Illness {CC  Problem List  Visit  Diagnosis   Encounters  Notes  Medications  Labs  Result Review Imaging  Media :23}     Wilber Hopkins presents to [facility] for:      History of Present Illness     He is having similar headache that he has had before.    Headache  Headache pattern:  Some headache always there, and the pain level varies  Duration:  3 to 6 months  Providers seen:  Gastroenterologist and psychiatrist  Previous testing:  CT and MRI  Number of ER visits for headache:  3  Time of day symptoms are worse:  No specific time of day  Do headaches wake patient from sleep?: No    Days of the week symptoms are worse:  No specific day of the week  Quality:  Tightness  Laterality:  Both sides at the same time  Location:  Top of head  Changes in thinking and mood:  None  Changes in vision:  None  Stomach/GI changes:  Stomach pain  Office Visit with Rehana Remy APRN (01/16/2023)      He has had increased stress.  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  States his family is not supportive of his choices.      He has been on lexapro in the past for depression: now on wellbutrtin and atarax per psych:  K Law  He has not had follow up and will follow up with them.       I have reviewed patient's medical history, any new submitted information provided by patient or medical assistant and updated medical record.      Objective:      Physical Exam  Vitals reviewed.   Constitutional:       Appearance: Normal appearance. He is well-developed.   HENT:      Head:      Comments: Wearing mask due to COVID   Neck:      Thyroid: No thyromegaly.   Cardiovascular:      Rate and Rhythm: Normal rate and regular rhythm.      Pulses: Normal pulses.      Heart sounds: Normal heart sounds.  "  Pulmonary:      Effort: Pulmonary effort is normal.      Breath sounds: Normal breath sounds.      Comments: E/U   Abdominal:      General: Bowel sounds are normal.      Tenderness: There is no abdominal tenderness. There is no guarding.   Skin:     General: Skin is warm and dry.   Neurological:      General: No focal deficit present.      Mental Status: He is alert and oriented to person, place, and time.      Motor: No weakness.   Psychiatric:         Mood and Affect: Mood normal.         Behavior: Behavior normal. Behavior is cooperative.         Thought Content: Thought content normal.         Judgment: Judgment normal.        Result Review  Data Reviewed:{ Labs  Result Review  Imaging  Med Tab  Media :23}                Vital Signs:   /68 (BP Location: Left arm, Patient Position: Sitting, Cuff Size: Adult)   Pulse 59   Temp 98.6 °F (37 °C) (Temporal)   Ht 180.3 cm (71\")   Wt 65 kg (143 lb 3.2 oz)   SpO2 100%   BMI 19.97 kg/m²         Requested Prescriptions     Signed Prescriptions Disp Refills   • nortriptyline (Pamelor) 25 MG capsule 30 capsule 1     Sig: Take 1 capsule by mouth Every Night.       Routine medications provided by this office will also be refilled via pharmacy request.       Current Outpatient Medications:   •  buPROPion XL (WELLBUTRIN XL) 150 MG 24 hr tablet, Take 150 mg by mouth Daily., Disp: , Rfl:   •  dicyclomine (BENTYL) 20 MG tablet, Take 1 tablet by mouth 3 (Three) Times a Day As Needed (abdominal pain and or cramping)., Disp: 90 tablet, Rfl: 1  •  hydrOXYzine (ATARAX) 25 MG tablet, Take 25 mg by mouth Every 6 (Six) Hours As Needed for Itching., Disp: , Rfl:   •  Multiple Vitamin (MULTIVITAMIN PO), Take  by mouth., Disp: , Rfl:   •  Saccharomyces boulardii (FLORASTORMAX PO), Take 20 mg by mouth., Disp: , Rfl:   •  nortriptyline (Pamelor) 25 MG capsule, Take 1 capsule by mouth Every Night., Disp: 30 capsule, Rfl: 1     Assessment and Plan:      Assessment and Plan {CC " Problem List  Visit Diagnosis  ROS  Review (Popup)  Health Maintenance  Quality  BestPractice  Medications  SmartSets  SnapShot Encounters  Media :23}     Problem List Items Addressed This Visit        Mental Health    Other specified anxiety disorders - Primary    Relevant Medications    nortriptyline (Pamelor) 25 MG capsule     Will add pamelor.  Should also help with GI issues and HA.   Eat more meals throughout the day. Increase fluid intake.   Not sure if he should continue wellbutrin.  He will reach out to psych and discuss with them.      He is under a lot of stress at home.       Follow Up {Instructions Charge Capture  Follow-up Communications :23}     Return in about 4 weeks (around 2/21/2023).      Patient was given instructions and counseling regarding his condition or for health maintenance advice. Please see specific information pulled into the AVS if appropriate.    Dragon disclaimer:   Much of this encounter note is an electronic transcription/translation of spoken language to printed text. The electronic translation of spoken language may permit erroneous, or at times, nonsensical words or phrases to be inadvertently transcribed; Although I have reviewed the note for such errors, some may still exist.     Additional Patient Counseling:       There are no Patient Instructions on file for this visit.

## 2023-02-06 ENCOUNTER — OFFICE VISIT (OUTPATIENT)
Dept: GASTROENTEROLOGY | Facility: CLINIC | Age: 23
End: 2023-02-06
Payer: COMMERCIAL

## 2023-02-06 VITALS
TEMPERATURE: 98 F | DIASTOLIC BLOOD PRESSURE: 70 MMHG | SYSTOLIC BLOOD PRESSURE: 122 MMHG | HEART RATE: 82 BPM | OXYGEN SATURATION: 98 % | WEIGHT: 142.2 LBS | HEIGHT: 71 IN | BODY MASS INDEX: 19.91 KG/M2

## 2023-02-06 DIAGNOSIS — K58.0 IRRITABLE BOWEL SYNDROME WITH DIARRHEA: Primary | ICD-10-CM

## 2023-02-06 DIAGNOSIS — K21.9 GASTROESOPHAGEAL REFLUX DISEASE, UNSPECIFIED WHETHER ESOPHAGITIS PRESENT: ICD-10-CM

## 2023-02-06 DIAGNOSIS — R10.30 LOWER ABDOMINAL PAIN: ICD-10-CM

## 2023-02-06 PROCEDURE — 99214 OFFICE O/P EST MOD 30 MIN: CPT | Performed by: NURSE PRACTITIONER

## 2023-02-06 RX ORDER — DICYCLOMINE HCL 20 MG
20 TABLET ORAL 3 TIMES DAILY PRN
Qty: 90 TABLET | Refills: 3 | Status: SHIPPED | OUTPATIENT
Start: 2023-02-06

## 2023-02-06 NOTE — PROGRESS NOTES
Chief Complaint   Patient presents with   • Diarrhea   • Heartburn   • Abdominal Pain       Wilber Hopkins is a  22 y.o. male here for a follow up visit for IBS.    HPI  22-year-old male presents today for follow-up visit for IBS with diarrhea.  He is a patient of Dr. Aly.  He was last seen in the office by me on 1/16/2023.  He has a history of IBS-D that is definitely made worse with his stress and anxiety.  He tells me he is doing better with that.  He is also taking bentyl again.  He tells me takes it once a day and this really seems to help calm things down.  He also has recently started Florastor probiotics and he feels like that is also helping.  He does have a history of GERD and admits he does well on omeprazole 20 mg OTC every day.  He is trying to eat more fiber in his diet.  He did have a normal gallbladder ultrasound and HIDA scan done recently.  His last EGD and colonoscopy was on 7/2020.  He denies any significant GI family history at this time.  He denies any dysphagia, nausea and vomiting, rectal bleeding or melena.  He admits his appetite is good and his weight is stable.  Past Medical History:   Diagnosis Date   • Anxiety    • Depression    • Gastritis    • GERD (gastroesophageal reflux disease)    • OCD (obsessive compulsive disorder)    • Panic attack    • PTSD (post-traumatic stress disorder)        Past Surgical History:   Procedure Laterality Date   • COLONOSCOPY N/A 7/18/2020    Procedure: COLONOSCOPY to cecum and t.i. with biopsies;  Surgeon: Tong Aly MD;  Location: [facility] ENDOSCOPY;  Service: Gastroenterology;  Laterality: N/A;  pre-  abd pain, n/v, diarrhea  post- normal   • ENDOSCOPY N/A 7/18/2020    Procedure: ESOPHAGOGASTRODUODENOSCOPY with biopsies;  Surgeon: Tong Aly MD;  Location: [facility] ENDOSCOPY;  Service: Gastroenterology;  Laterality: N/A;  pre- abd pain, n/v, diarrhea  post- gastritis       Scheduled Meds:    Continuous Infusions:No current facility-administered  medications for this visit.      PRN Meds:.    Allergies   Allergen Reactions   • Amoxicillin GI Intolerance   • Penicillins GI Intolerance       Social History     Socioeconomic History   • Marital status: Single   Tobacco Use   • Smoking status: Never   • Smokeless tobacco: Never   Vaping Use   • Vaping Use: Never used   Substance and Sexual Activity   • Alcohol use: No   • Drug use: No   • Sexual activity: Never       Family History   Problem Relation Age of Onset   • Anxiety disorder Mother    • Mental illness Father         Brain Damage causing mental issues   • Prostate cancer Neg Hx    • Colon cancer Neg Hx    • Breast cancer Neg Hx        Review of Systems   Constitutional: Negative for appetite change, chills, diaphoresis, fatigue, fever and unexpected weight change.   HENT: Negative for nosebleeds, postnasal drip, sore throat, trouble swallowing and voice change.    Respiratory: Negative for cough, choking, chest tightness, shortness of breath, wheezing and stridor.    Cardiovascular: Negative for chest pain, palpitations and leg swelling.   Gastrointestinal: Positive for abdominal pain. Negative for abdominal distention, anal bleeding, blood in stool, constipation, diarrhea, nausea, rectal pain and vomiting.   Endocrine: Negative for polydipsia, polyphagia and polyuria.   Musculoskeletal: Negative for gait problem.   Skin: Negative for rash and wound.   Allergic/Immunologic: Negative for food allergies.   Neurological: Negative for dizziness, speech difficulty and light-headedness.   Psychiatric/Behavioral: Negative for confusion, self-injury, sleep disturbance and suicidal ideas.       Vitals:    02/06/23 0836   BP: 122/70   Pulse: 82   Temp: 98 °F (36.7 °C)   SpO2: 98%       Physical Exam  Constitutional:       General: He is not in acute distress.     Appearance: He is well-developed. He is not ill-appearing.   HENT:      Head: Normocephalic.   Eyes:      Pupils: Pupils are equal, round, and reactive  to light.   Cardiovascular:      Rate and Rhythm: Normal rate and regular rhythm.      Heart sounds: Normal heart sounds.   Pulmonary:      Effort: Pulmonary effort is normal.      Breath sounds: Normal breath sounds.   Abdominal:      General: Bowel sounds are normal. There is no distension.      Palpations: Abdomen is soft. There is no mass.      Tenderness: There is no abdominal tenderness. There is no guarding or rebound.      Hernia: No hernia is present.   Musculoskeletal:         General: Normal range of motion.   Skin:     General: Skin is warm and dry.   Neurological:      Mental Status: He is alert and oriented to person, place, and time.   Psychiatric:         Speech: Speech normal.         Behavior: Behavior normal.         Judgment: Judgment normal.         No radiology results for the last 7 days     Diagnoses and all orders for this visit:    1. Irritable bowel syndrome with diarrhea (Primary)    2. Gastroesophageal reflux disease, unspecified whether esophagitis present  Overview:  Added automatically from request for surgery [id number]      3. Lower abdominal pain    Other orders  -     dicyclomine (BENTYL) 20 MG tablet; Take 1 tablet by mouth 3 (Three) Times a Day As Needed (abdominal pain and or cramping).  Dispense: 90 tablet; Refill: 3    IBS with diarrhea definitely sounds better on the bentyl and high-fiber diet.  Still having a painful bowel movement every morning but the other bowel movements throughout the day are much better.  Continue daily probiotics.  GERD seems well controlled on omeprazole 20 mg OTC daily.  Continue GERD precautions.  Overall he definitely seems better.  Patient to continue to work on a high-fiber diet.  He is to continue to work on stress management as he has realized his stress makes his IBS symptoms worse.  Patient to call the office with any issues.  Patient to follow-up with me in 3 months.  Patient is agreeable to the plan.

## 2023-02-21 ENCOUNTER — OFFICE VISIT (OUTPATIENT)
Dept: INTERNAL MEDICINE | Facility: CLINIC | Age: 23
End: 2023-02-21
Payer: COMMERCIAL

## 2023-02-21 VITALS
TEMPERATURE: 96.2 F | DIASTOLIC BLOOD PRESSURE: 70 MMHG | OXYGEN SATURATION: 96 % | HEIGHT: 71 IN | HEART RATE: 104 BPM | SYSTOLIC BLOOD PRESSURE: 112 MMHG | BODY MASS INDEX: 19.57 KG/M2 | WEIGHT: 139.8 LBS

## 2023-02-21 DIAGNOSIS — F41.8 OTHER SPECIFIED ANXIETY DISORDERS: ICD-10-CM

## 2023-02-21 DIAGNOSIS — K58.0 IRRITABLE BOWEL SYNDROME WITH DIARRHEA: Primary | ICD-10-CM

## 2023-02-21 PROCEDURE — 99214 OFFICE O/P EST MOD 30 MIN: CPT | Performed by: NURSE PRACTITIONER

## 2023-02-21 RX ORDER — HYDROXYZINE PAMOATE 25 MG/1
25 CAPSULE ORAL DAILY PRN
COMMUNITY
Start: 2023-01-26

## 2023-02-21 RX ORDER — NORTRIPTYLINE HYDROCHLORIDE 25 MG/1
25 CAPSULE ORAL NIGHTLY
Qty: 30 CAPSULE | Refills: 5 | Status: SHIPPED | OUTPATIENT
Start: 2023-02-21

## 2023-02-21 NOTE — PROGRESS NOTES
"        Chief Complaint  Jaw Pain (4 week f/u. Head pain)     Subjective:      History of Present Illness {CC  Problem List  Visit  Diagnosis   Encounters  Notes  Medications  Labs  Result Review Imaging  Media :23}     Wilber Hopkins presents to [facility] for:      Follow up HA: ? Stress related - states doing better at home.   LV: started nortriptyline and states it has improved.  Does not feel he needs to go up on dose.     He was playing soccer and hit top of head.  He had some jaw pain on left but states better today.     He has seen GI: states bowels doing better on probiotic.  Needs to increase water intake.       I have reviewed patient's medical history, any new submitted information provided by patient or medical assistant and updated medical record.      Objective:      Physical Exam  HENT:      Head:      Jaw: No tenderness, swelling or pain on movement.   Cardiovascular:      Rate and Rhythm: Normal rate.      Pulses: Normal pulses.   Pulmonary:      Effort: Pulmonary effort is normal.   Neurological:      Mental Status: He is oriented to person, place, and time.        Result Review  Data Reviewed:{ Labs  Result Review  Imaging  Med Tab  Media :23}                Vital Signs:   /70 (BP Location: Left arm, Patient Position: Sitting, Cuff Size: Adult)   Pulse 104   Temp 96.2 °F (35.7 °C) (Infrared)   Ht 180.3 cm (71\")   Wt 63.4 kg (139 lb 12.8 oz)   SpO2 96%   BMI 19.50 kg/m²         Requested Prescriptions     Signed Prescriptions Disp Refills   • nortriptyline (Pamelor) 25 MG capsule 30 capsule 5     Sig: Take 1 capsule by mouth Every Night.       Routine medications provided by this office will also be refilled via pharmacy request.       Current Outpatient Medications:   •  buPROPion XL (WELLBUTRIN XL) 150 MG 24 hr tablet, Take 150 mg by mouth Daily., Disp: , Rfl:   •  dicyclomine (BENTYL) 20 MG tablet, Take 1 tablet by mouth 3 (Three) Times " a Day As Needed (abdominal pain and or cramping)., Disp: 90 tablet, Rfl: 3  •  hydrOXYzine pamoate (VISTARIL) 25 MG capsule, Take 25 mg by mouth Daily As Needed., Disp: , Rfl:   •  Multiple Vitamin (MULTIVITAMIN PO), Take  by mouth., Disp: , Rfl:   •  nortriptyline (Pamelor) 25 MG capsule, Take 1 capsule by mouth Every Night., Disp: 30 capsule, Rfl: 5  •  Saccharomyces boulardii (FLORASTORMAX PO), Take 20 mg by mouth., Disp: , Rfl:      Assessment and Plan:      Assessment and Plan {CC Problem List  Visit Diagnosis  ROS  Review (Popup)  Health Maintenance  Quality  BestPractice  Medications  SmartSets  SnapShot Encounters  Media :23}     Problem List Items Addressed This Visit        Gastrointestinal Abdominal     Irritable bowel syndrome with diarrhea - Primary    Current Assessment & Plan     Improved with probiotic and pamelor.   Continue             Mental Health    Other specified anxiety disorders    Current Assessment & Plan     Psychological condition is improving.   Pamelor refilled.          Relevant Medications    hydrOXYzine pamoate (VISTARIL) 25 MG capsule    nortriptyline (Pamelor) 25 MG capsule       Follow Up {Instructions Charge Capture  Follow-up Communications :23}     Return in about 5 months (around 7/17/2023) for Annual physical.      Patient was given instructions and counseling regarding his condition or for health maintenance advice. Please see specific information pulled into the AVS if appropriate.    Dragon disclaimer:   Much of this encounter note is an electronic transcription/translation of spoken language to printed text. The electronic translation of spoken language may permit erroneous, or at times, nonsensical words or phrases to be inadvertently transcribed; Although I have reviewed the note for such errors, some may still exist.     Additional Patient Counseling:       There are no Patient Instructions on file for this visit.

## 2023-03-30 ENCOUNTER — TELEPHONE (OUTPATIENT)
Dept: GASTROENTEROLOGY | Facility: CLINIC | Age: 23
End: 2023-03-30

## 2023-03-30 NOTE — TELEPHONE ENCOUNTER
Tong Aly MD  to Me • Jackie Walden RN • Dominique Fernandez RN        4:23 PM   You could work him in to see me this Monday, 4/3/23 at 10:30 am.  If he cannot wait that long then have him go to the ER. Thx.kj    **Call to pt.  Advise of above.  Accepts appt.  Message to Alba.  Christy Blue RN.

## 2023-03-30 NOTE — TELEPHONE ENCOUNTER
Call to pt.  States for past wk has experienced lower abd and mid abd pain with BM's - ranks at 8 on pain scale.  Otherwise, lower abd pain at 4.  Diarrhea x3 yesterday, x 1 today.  Denies blood, fever.  Taking dicyclomine 20 mg 2x/day with some relief.      Reports yesterday did have one episode of passing very hard stool - had to strain to pass.      Asking how to manage persistent symptoms.      No sooner appt available.  Advise will send message to Dr Aly and in the meantime - if symptoms become OOC, go to ER.  Verb understanding.

## 2023-04-03 ENCOUNTER — OFFICE VISIT (OUTPATIENT)
Dept: GASTROENTEROLOGY | Facility: CLINIC | Age: 23
End: 2023-04-03
Payer: COMMERCIAL

## 2023-04-03 VITALS
BODY MASS INDEX: 20.33 KG/M2 | HEIGHT: 71 IN | DIASTOLIC BLOOD PRESSURE: 74 MMHG | HEART RATE: 69 BPM | OXYGEN SATURATION: 98 % | TEMPERATURE: 97.8 F | WEIGHT: 145.2 LBS | SYSTOLIC BLOOD PRESSURE: 124 MMHG

## 2023-04-03 DIAGNOSIS — K58.2 IRRITABLE BOWEL SYNDROME WITH BOTH CONSTIPATION AND DIARRHEA: ICD-10-CM

## 2023-04-03 DIAGNOSIS — R10.30 LOWER ABDOMINAL PAIN: Primary | ICD-10-CM

## 2023-04-03 PROCEDURE — 99214 OFFICE O/P EST MOD 30 MIN: CPT | Performed by: INTERNAL MEDICINE

## 2023-04-03 PROCEDURE — 1159F MED LIST DOCD IN RCRD: CPT | Performed by: INTERNAL MEDICINE

## 2023-04-03 PROCEDURE — 1160F RVW MEDS BY RX/DR IN RCRD: CPT | Performed by: INTERNAL MEDICINE

## 2023-04-03 RX ORDER — HYOSCYAMINE SULFATE 0.125 MG
0.12 TABLET ORAL
Qty: 120 TABLET | Refills: 11 | Status: SHIPPED | OUTPATIENT
Start: 2023-04-03

## 2023-04-03 NOTE — PROGRESS NOTES
"Chief Complaint   Patient presents with   • Rectal Pain       History of Present Illness:   23 y.o. male         His last EGD and colonoscopy were on 7/2020.        On 3/30/23 one of the Nurses put in the chart:  \"Call to pt.  States for past wk has experienced lower abd and mid abd pain with BM's - ranks at 8 on pain scale.  Otherwise, lower abd pain at 4.  Diarrhea x3 yesterday, x 1 today.  Denies blood, fever.  Taking dicyclomine 20 mg 2x/day with some relief.       Reports yesterday did have one episode of passing very hard stool - had to strain to pass.       Asking how to manage persistent symptoms.       No sooner appt available.  Advise will send message to Dr Aly and in the meantime - if symptoms become OOC, go to ER.  Verb understanding. \"       His GI workup so far has included:       On 3/30/22 he had a CT abd/pelvis that was unrevealing.       1/18/21 - Kinevac Hida scan - normal.       10/20 - US gallbladder - normal       8/20 - SBFT - normal       7/20 - EGD - unrevealing       7/20 - Colonoscopy - unremarkable.       He is doing a [place] for his Mandaeism.        C/o couple week h/o consitpation alternating with diarrhea. Constant lower abdominal pain when he is having a BM. It may be worse when working out in the AM. No change with eating. His weight has gone up. +nausea but no vomiting. No fevers, chills. NO rectal bleeding or melena. He averages 2 BM/day. He has had 3-4 BM/day the last few days. Bentyl helps temporarily. .    Past Medical History:   Diagnosis Date   • Anxiety    • Depression    • Gastritis    • GERD (gastroesophageal reflux disease)    • OCD (obsessive compulsive disorder)    • Panic attack    • PTSD (post-traumatic stress disorder)        Past Surgical History:   Procedure Laterality Date   • COLONOSCOPY N/A 07/18/2020    Procedure: COLONOSCOPY to cecum and t.i. with biopsies;  Surgeon: Tong Aly MD;  Location: [facility] ENDOSCOPY;  Service: Gastroenterology;  Laterality: N/A;  " pre-  abd pain, n/v, diarrhea  post- normal   • ENDOSCOPY N/A 07/18/2020    Procedure: ESOPHAGOGASTRODUODENOSCOPY with biopsies;  Surgeon: Tong Aly MD;  Location: [facility] ENDOSCOPY;  Service: Gastroenterology;  Laterality: N/A;  pre- abd pain, n/v, diarrhea  post- gastritis         Current Outpatient Medications:   •  buPROPion XL (WELLBUTRIN XL) 150 MG 24 hr tablet, Take 1 tablet by mouth Daily., Disp: , Rfl:   •  dicyclomine (BENTYL) 20 MG tablet, Take 1 tablet by mouth 3 (Three) Times a Day As Needed (abdominal pain and or cramping)., Disp: 90 tablet, Rfl: 3  •  hydrOXYzine pamoate (VISTARIL) 25 MG capsule, Take 1 capsule by mouth Daily As Needed., Disp: , Rfl:   •  Multiple Vitamin (MULTIVITAMIN PO), Take  by mouth., Disp: , Rfl:   •  nortriptyline (Pamelor) 25 MG capsule, Take 1 capsule by mouth Every Night., Disp: 30 capsule, Rfl: 5  •  Saccharomyces boulardii (FLORASTORMAX PO), Take 20 mg by mouth., Disp: , Rfl:   •  hyoscyamine (ANASPAZ,LEVSIN) 0.125 MG tablet, Take 1 tablet by mouth 4 (Four) Times a Day After Meals & at Bedtime., Disp: 120 tablet, Rfl: 11    Allergies   Allergen Reactions   • Amoxicillin GI Intolerance   • Penicillins GI Intolerance       Family History   Problem Relation Age of Onset   • Anxiety disorder Mother    • Mental illness Father         Brain Damage causing mental issues   • Prostate cancer Neg Hx    • Colon cancer Neg Hx    • Breast cancer Neg Hx        Social History     Socioeconomic History   • Marital status: Single   Tobacco Use   • Smoking status: Never   • Smokeless tobacco: Never   Vaping Use   • Vaping Use: Never used   Substance and Sexual Activity   • Alcohol use: No   • Drug use: No   • Sexual activity: Never       Review of Systems   Gastrointestinal: Positive for abdominal pain.   All other systems reviewed and are negative.    Pertinent positives and negatives documented in the HPI and all other systems reviewed and were found to be negative.  Vitals:     04/03/23 1032   BP: 124/74   Pulse: 69   Temp: 97.8 °F (36.6 °C)   SpO2: 98%       Physical Exam  Vitals reviewed.   Constitutional:       General: He is not in acute distress.     Appearance: Normal appearance. He is well-developed. He is not diaphoretic.   HENT:      Head: Normocephalic and atraumatic. Hair is normal.      Right Ear: Hearing, tympanic membrane, ear canal and external ear normal.      Left Ear: Hearing, tympanic membrane, ear canal and external ear normal.      Nose: Nose normal. No nasal deformity.      Mouth/Throat:      Mouth: Mucous membranes are moist. No oral lesions.      Pharynx: Uvula midline. No uvula swelling.   Eyes:      General: Lids are normal. No scleral icterus.        Right eye: No discharge.         Left eye: No discharge.      Extraocular Movements: Extraocular movements intact.      Right eye: Normal extraocular motion and no nystagmus.      Left eye: Normal extraocular motion and no nystagmus.      Conjunctiva/sclera: Conjunctivae normal.      Pupils: Pupils are equal, round, and reactive to light.   Neck:      Thyroid: No thyromegaly.      Vascular: No JVD.   Cardiovascular:      Rate and Rhythm: Normal rate and regular rhythm.      Pulses: Normal pulses.      Heart sounds: Normal heart sounds. No murmur heard.    No gallop.   Pulmonary:      Effort: Pulmonary effort is normal. No respiratory distress.      Breath sounds: Normal breath sounds. No wheezing or rales.   Chest:      Chest wall: No tenderness.   Abdominal:      General: Bowel sounds are normal. There is no distension.      Palpations: Abdomen is soft. There is no mass.      Tenderness: There is no abdominal tenderness. There is no guarding.      Hernia: No hernia is present.   Genitourinary:     Rectum: Normal. Guaiac result negative.   Musculoskeletal:         General: No tenderness or deformity. Normal range of motion.      Cervical back: Normal range of motion and neck supple.   Lymphadenopathy:      Cervical:  No cervical adenopathy.   Skin:     General: Skin is warm and dry.      Findings: No rash.   Neurological:      Mental Status: He is alert and oriented to person, place, and time.      Cranial Nerves: No cranial nerve deficit.      Motor: No abnormal muscle tone.      Coordination: Coordination normal.      Deep Tendon Reflexes: Reflexes are normal and symmetric. Reflexes normal.   Psychiatric:         Mood and Affect: Mood normal.         Behavior: Behavior normal.         Thought Content: Thought content normal.         Judgment: Judgment normal.         Diagnoses and all orders for this visit:    1. Lower abdominal pain (Primary)  -     hyoscyamine (ANASPAZ,LEVSIN) 0.125 MG tablet; Take 1 tablet by mouth 4 (Four) Times a Day After Meals & at Bedtime.  Dispense: 120 tablet; Refill: 11  -     CBC & Differential  -     Comprehensive Metabolic Panel  -     Celiac Ab tTG DGP TIgA  -     TSH    2. Irritable bowel syndrome with both constipation and diarrhea  -     hyoscyamine (ANASPAZ,LEVSIN) 0.125 MG tablet; Take 1 tablet by mouth 4 (Four) Times a Day After Meals & at Bedtime.  Dispense: 120 tablet; Refill: 11  -     CBC & Differential  -     Comprehensive Metabolic Panel  -     Celiac Ab tTG DGP TIgA  -     TSH      Assessment:  1. Abdominal pain  2. Alternating diarrhea and constipation.  3.     Recommendations:  1. Labs  2. Fiber daily  3. Hyoscyamine 0.375 mg one po q 12 hrs (sustained release) - his insurance won't cover this so I will have to try hyoscyamine 0.125 mg 1 po QID prn  4. F/u 3 mos.     Return in about 3 months (around 7/3/2023).    Tong Aly MD  4/3/2023

## 2023-04-04 LAB
ALBUMIN SERPL-MCNC: 4.7 G/DL (ref 3.5–5.2)
ALBUMIN/GLOB SERPL: 2.2 G/DL
ALP SERPL-CCNC: 76 U/L (ref 39–117)
ALT SERPL-CCNC: 21 U/L (ref 1–41)
AST SERPL-CCNC: 22 U/L (ref 1–40)
BASOPHILS # BLD AUTO: 0.02 10*3/MM3 (ref 0–0.2)
BASOPHILS NFR BLD AUTO: 0.4 % (ref 0–1.5)
BILIRUB SERPL-MCNC: 0.2 MG/DL (ref 0–1.2)
BUN SERPL-MCNC: 14 MG/DL (ref 6–20)
BUN/CREAT SERPL: 14.7 (ref 7–25)
CALCIUM SERPL-MCNC: 9.9 MG/DL (ref 8.6–10.5)
CHLORIDE SERPL-SCNC: 100 MMOL/L (ref 98–107)
CO2 SERPL-SCNC: 28.6 MMOL/L (ref 22–29)
CREAT SERPL-MCNC: 0.95 MG/DL (ref 0.76–1.27)
EGFRCR SERPLBLD CKD-EPI 2021: 116.1 ML/MIN/1.73
EOSINOPHIL # BLD AUTO: 0.06 10*3/MM3 (ref 0–0.4)
EOSINOPHIL NFR BLD AUTO: 1.1 % (ref 0.3–6.2)
ERYTHROCYTE [DISTWIDTH] IN BLOOD BY AUTOMATED COUNT: 13 % (ref 12.3–15.4)
GLIADIN PEPTIDE IGA SER-ACNC: 6 UNITS (ref 0–19)
GLIADIN PEPTIDE IGG SER-ACNC: 4 UNITS (ref 0–19)
GLOBULIN SER CALC-MCNC: 2.1 GM/DL
GLUCOSE SERPL-MCNC: 91 MG/DL (ref 65–99)
HCT VFR BLD AUTO: 41.4 % (ref 37.5–51)
HGB BLD-MCNC: 14.4 G/DL (ref 13–17.7)
IGA SERPL-MCNC: 106 MG/DL (ref 90–386)
IMM GRANULOCYTES # BLD AUTO: 0.02 10*3/MM3 (ref 0–0.05)
IMM GRANULOCYTES NFR BLD AUTO: 0.4 % (ref 0–0.5)
LYMPHOCYTES # BLD AUTO: 1.65 10*3/MM3 (ref 0.7–3.1)
LYMPHOCYTES NFR BLD AUTO: 31.3 % (ref 19.6–45.3)
MCH RBC QN AUTO: 32.1 PG (ref 26.6–33)
MCHC RBC AUTO-ENTMCNC: 34.8 G/DL (ref 31.5–35.7)
MCV RBC AUTO: 92.2 FL (ref 79–97)
MONOCYTES # BLD AUTO: 0.38 10*3/MM3 (ref 0.1–0.9)
MONOCYTES NFR BLD AUTO: 7.2 % (ref 5–12)
NEUTROPHILS # BLD AUTO: 3.14 10*3/MM3 (ref 1.7–7)
NEUTROPHILS NFR BLD AUTO: 59.6 % (ref 42.7–76)
NRBC BLD AUTO-RTO: 0 /100 WBC (ref 0–0.2)
PLATELET # BLD AUTO: 158 10*3/MM3 (ref 140–450)
POTASSIUM SERPL-SCNC: 4.4 MMOL/L (ref 3.5–5.2)
PROT SERPL-MCNC: 6.8 G/DL (ref 6–8.5)
RBC # BLD AUTO: 4.49 10*6/MM3 (ref 4.14–5.8)
SODIUM SERPL-SCNC: 138 MMOL/L (ref 136–145)
TSH SERPL DL<=0.005 MIU/L-ACNC: 2.6 UIU/ML (ref 0.27–4.2)
TTG IGA SER-ACNC: <2 U/ML (ref 0–3)
TTG IGG SER-ACNC: <2 U/ML (ref 0–5)
WBC # BLD AUTO: 5.27 10*3/MM3 (ref 3.4–10.8)

## 2023-04-06 NOTE — TELEPHONE ENCOUNTER
Caller: Wilber Hopkins     Relationship: SELF    Best call back number: [phone]    What is your medical concern? PAINFUL BOWEL MOVEMENTS. GENERAL ABDOMINAL PAIN THROUGH THE DAY. PATIENT REQUESTING A SOONER APPOINTMENT OR A CALL TO SEE ABOUT RELIEF.       How long has this issue been going on? 1 WEEK     Is your provider already aware of this issue? NO     Have you been treated for this issue? NO    Yes

## 2023-04-17 NOTE — PROGRESS NOTES
04/17/23       Tell him that the following tests came back normal: Celiac sprue antibody panel, CBC, comprehensive metabolic profile, and thyroid-stimulating hormone.  Please send a copy of this report to his PCP.  Becca olea

## 2023-05-17 ENCOUNTER — TELEPHONE (OUTPATIENT)
Dept: GASTROENTEROLOGY | Facility: CLINIC | Age: 23
End: 2023-05-17
Payer: COMMERCIAL

## 2023-05-17 NOTE — TELEPHONE ENCOUNTER
Called pt and advised of Dr Aly's note. Verb understanding.     Results sent to ZI Avila NP thru [facility].

## 2023-05-17 NOTE — TELEPHONE ENCOUNTER
----- Message from Tong Aly MD sent at 4/17/2023 10:33 AM EDT -----  04/17/23       Tell him that the following tests came back normal: Celiac sprue antibody panel, CBC, comprehensive metabolic profile, and thyroid-stimulating hormone.  Please send a copy of this report to his PCP.  Serenex. kjh

## 2023-06-03 ENCOUNTER — HOSPITAL ENCOUNTER (EMERGENCY)
Facility: HOSPITAL | Age: 23
Discharge: HOME | End: 2023-06-03
Attending: STUDENT IN AN ORGANIZED HEALTH CARE EDUCATION/TRAINING PROGRAM
Payer: COMMERCIAL

## 2023-06-03 VITALS
RESPIRATION RATE: 18 BRPM | WEIGHT: 140 LBS | HEIGHT: 71 IN | OXYGEN SATURATION: 97 % | HEART RATE: 102 BPM | SYSTOLIC BLOOD PRESSURE: 123 MMHG | DIASTOLIC BLOOD PRESSURE: 75 MMHG | BODY MASS INDEX: 19.6 KG/M2 | TEMPERATURE: 99 F

## 2023-06-03 DIAGNOSIS — S06.0X0A CONCUSSION WITHOUT LOSS OF CONSCIOUSNESS, INITIAL ENCOUNTER: ICD-10-CM

## 2023-06-03 DIAGNOSIS — S01.81XA FACIAL LACERATION, INITIAL ENCOUNTER: Primary | ICD-10-CM

## 2023-06-03 PROCEDURE — 63710000001 ONDANSETRON PER 8 MG: Performed by: STUDENT IN AN ORGANIZED HEALTH CARE EDUCATION/TRAINING PROGRAM

## 2023-06-03 PROCEDURE — 99283 EMERGENCY DEPT VISIT LOW MDM: CPT

## 2023-06-03 RX ORDER — ONDANSETRON 4 MG/1
4 TABLET, ORALLY DISINTEGRATING ORAL EVERY 6 HOURS PRN
Qty: 30 TABLET | Refills: 0 | Status: SHIPPED | OUTPATIENT
Start: 2023-06-03

## 2023-06-03 RX ORDER — ACETAMINOPHEN 500 MG
1000 TABLET ORAL ONCE
Status: COMPLETED | OUTPATIENT
Start: 2023-06-03 | End: 2023-06-03

## 2023-06-03 RX ORDER — ONDANSETRON 4 MG/1
4 TABLET, FILM COATED ORAL ONCE
Status: COMPLETED | OUTPATIENT
Start: 2023-06-03 | End: 2023-06-03

## 2023-06-03 RX ORDER — LIDOCAINE HYDROCHLORIDE AND EPINEPHRINE 10; 10 MG/ML; UG/ML
10 INJECTION, SOLUTION INFILTRATION; PERINEURAL ONCE
Status: COMPLETED | OUTPATIENT
Start: 2023-06-03 | End: 2023-06-03

## 2023-06-03 RX ADMIN — ACETAMINOPHEN 1000 MG: 500 TABLET ORAL at 21:35

## 2023-06-03 RX ADMIN — ONDANSETRON HYDROCHLORIDE 4 MG: 4 TABLET, FILM COATED ORAL at 21:58

## 2023-06-03 RX ADMIN — LIDOCAINE HYDROCHLORIDE,EPINEPHRINE BITARTRATE 10 ML: 10; .01 INJECTION, SOLUTION INFILTRATION; PERINEURAL at 21:36

## 2023-06-04 NOTE — FSED PROVIDER NOTE
Subjective   History of Present Illness  23-year-old male with past medical history of gastritis presents to the emergency department with a head injury.  Just prior to arrival, he reports he was playing soccer and collided heads with another player.  He notes a small laceration over his right eyebrow.  He denies loss of consciousness.  No anticoagulation.  He reports a mild throbbing headache, and nausea.  No vomiting.  He has no other complaints at this time.    History provided by:  Patient    Review of Systems   Constitutional:  Negative for chills and fever.   HENT:  Negative for congestion and sore throat.    Eyes:  Negative for pain and redness.   Respiratory:  Negative for cough and shortness of breath.    Cardiovascular:  Negative for chest pain and palpitations.   Gastrointestinal:  Positive for nausea. Negative for abdominal pain and vomiting.   Genitourinary:  Negative for difficulty urinating and dysuria.   Musculoskeletal:  Negative for back pain and neck pain.   Skin:  Positive for wound. Negative for rash.   Neurological:  Positive for headaches. Negative for weakness and numbness.   All other systems reviewed and are negative.    Past Medical History:   Diagnosis Date    Anxiety     Depression     Gastritis     GERD (gastroesophageal reflux disease)     OCD (obsessive compulsive disorder)     Panic attack     PTSD (post-traumatic stress disorder)        Allergies   Allergen Reactions    Amoxicillin GI Intolerance    Penicillins GI Intolerance       Past Surgical History:   Procedure Laterality Date    COLONOSCOPY N/A 07/18/2020    Procedure: COLONOSCOPY to cecum and t.i. with biopsies;  Surgeon: Tong Aly MD;  Location: [facility] ENDOSCOPY;  Service: Gastroenterology;  Laterality: N/A;  pre-  abd pain, n/v, diarrhea  post- normal    ENDOSCOPY N/A 07/18/2020    Procedure: ESOPHAGOGASTRODUODENOSCOPY with biopsies;  Surgeon: Tong Aly MD;  Location: [facility] ENDOSCOPY;  Service: Gastroenterology;   Laterality: N/A;  pre- abd pain, n/v, diarrhea  post- gastritis       Family History   Problem Relation Age of Onset    Anxiety disorder Mother     Mental illness Father         Brain Damage causing mental issues    Prostate cancer Neg Hx     Colon cancer Neg Hx     Breast cancer Neg Hx        Social History     Socioeconomic History    Marital status: Single   Tobacco Use    Smoking status: Never    Smokeless tobacco: Never   Vaping Use    Vaping Use: Never used   Substance and Sexual Activity    Alcohol use: No    Drug use: No    Sexual activity: Never           Objective   Physical Exam  Vitals and nursing note reviewed.   Constitutional:       General: He is not in acute distress.     Appearance: Normal appearance.   HENT:      Head: Normocephalic and atraumatic.      Mouth/Throat:      Mouth: Mucous membranes are moist.   Eyes:      Extraocular Movements: Extraocular movements intact.      Conjunctiva/sclera: Conjunctivae normal.      Pupils: Pupils are equal, round, and reactive to light.   Cardiovascular:      Rate and Rhythm: Normal rate and regular rhythm.      Pulses: Normal pulses.      Heart sounds: Normal heart sounds.   Pulmonary:      Effort: Pulmonary effort is normal. No respiratory distress.      Breath sounds: Normal breath sounds.   Abdominal:      General: There is no distension.      Palpations: Abdomen is soft.      Tenderness: There is no abdominal tenderness.   Musculoskeletal:         General: Normal range of motion.      Cervical back: Normal range of motion and neck supple.   Skin:     General: Skin is warm.      Comments: 0.5 cm over right eyebrow.  No foreign bodies.  No active bleeding.   Neurological:      General: No focal deficit present.      Mental Status: He is alert and oriented to person, place, and time.      Cranial Nerves: No cranial nerve deficit.      Sensory: No sensory deficit.      Motor: No weakness.      Coordination: Coordination normal.      Gait: Gait normal.    Psychiatric:         Mood and Affect: Mood normal.         Behavior: Behavior normal.       Laceration Repair    Date/Time: 6/3/2023 9:59 PM  Performed by: Julio César Nava MD  Authorized by: Julio César Nava MD     Consent:     Consent obtained:  Verbal    Consent given by:  Patient    Risks discussed:  Infection and pain    Alternatives discussed:  No treatment, delayed treatment, observation and referral  Universal protocol:     Patient identity confirmed:  Verbally with patient  Anesthesia:     Anesthesia method:  Local infiltration    Local anesthetic:  Lidocaine 1% WITH epi  Laceration details:     Location:  Face    Face location:  R eyebrow    Length (cm):  0.5  Pre-procedure details:     Preparation:  Patient was prepped and draped in usual sterile fashion  Exploration:     Hemostasis achieved with:  Direct pressure    Wound exploration: entire depth of wound visualized      Contaminated: no    Treatment:     Area cleansed with:  Saline    Amount of cleaning:  Standard  Skin repair:     Repair method:  Sutures    Suture size:  5-0    Suture material:  Nylon    Suture technique:  Simple interrupted    Number of sutures:  2  Approximation:     Approximation:  Close  Repair type:     Repair type:  Simple  Post-procedure details:     Dressing:  Adhesive bandage    Procedure completion:  Tolerated well, no immediate complications           ED Course                                           Medical Decision Making  23-year-old male presents the emergency department with a facial lacerations after colliding with another .  Patient has a nonfocal neurologic exam.  Patient does not meet criteria for imaging based on the Vian head CT rule.  Patient sutured at bedside without any complication.  Counseled to follow-up closely with the primary care physician.  Counseled to have the sutures removed in 5 to 7 days for wound recheck.  Return to the ED for any new or worsening symptoms.  Patient  expressed understanding and agreement with this plan.  Patient discharged home.    Problems Addressed:  Concussion without loss of consciousness, initial encounter: acute illness or injury  Facial laceration, initial encounter: acute illness or injury    Risk  OTC drugs.  Prescription drug management.        Final diagnoses:   Facial laceration, initial encounter   Concussion without loss of consciousness, initial encounter       ED Disposition  ED Disposition       ED Disposition   Discharge    Condition   Stable    Comment   --               Charito Avila III, NP-C  [address] [id number] [phone]    Schedule an appointment as soon as possible for a visit in 1 week  For suture removal, For wound re-check    [facility]  [address] [facility] [id number]    As needed, If symptoms worsen         Medication List        New Prescriptions      ondansetron ODT 4 MG disintegrating tablet  Commonly known as: ZOFRAN-ODT  Place 1 tablet on the tongue Every 6 (Six) Hours As Needed for Nausea or Vomiting.               Where to Get Your Medications        These medications were sent to [facility] [address] [place] [id number] [facility] - [phone]  - [phone]   [id number] [place] [address]      Phone: [phone]   ondansetron ODT 4 MG disintegrating tablet

## 2023-06-09 ENCOUNTER — HOSPITAL ENCOUNTER (OUTPATIENT)
Facility: HOSPITAL | Age: 23
Discharge: HOME | End: 2023-06-09
Attending: EMERGENCY MEDICINE | Admitting: EMERGENCY MEDICINE
Payer: COMMERCIAL

## 2023-06-09 VITALS
TEMPERATURE: 97.9 F | HEART RATE: 84 BPM | SYSTOLIC BLOOD PRESSURE: 132 MMHG | BODY MASS INDEX: 19.6 KG/M2 | WEIGHT: 140 LBS | OXYGEN SATURATION: 100 % | RESPIRATION RATE: 16 BRPM | HEIGHT: 71 IN | DIASTOLIC BLOOD PRESSURE: 85 MMHG

## 2023-06-09 DIAGNOSIS — Z48.02 VISIT FOR SUTURE REMOVAL: Primary | ICD-10-CM

## 2023-06-09 PROCEDURE — G0463 HOSPITAL OUTPT CLINIC VISIT: HCPCS | Performed by: NURSE PRACTITIONER

## 2023-06-09 NOTE — FSED PROVIDER NOTE
Subjective   History of Present Illness  Patient presents for suture removal.  States he had a laceration over his right eyebrow with 2 sutures that need to be removed.  States they have been there for 3 or 4 days.  Denies redness, drainage, fever, chills.    Review of Systems   Skin:  Positive for wound.   All other systems reviewed and are negative.    Past Medical History:   Diagnosis Date    Anxiety     Depression     Gastritis     GERD (gastroesophageal reflux disease)     OCD (obsessive compulsive disorder)     Panic attack     PTSD (post-traumatic stress disorder)        Allergies   Allergen Reactions    Amoxicillin GI Intolerance    Penicillins GI Intolerance       Past Surgical History:   Procedure Laterality Date    COLONOSCOPY N/A 07/18/2020    Procedure: COLONOSCOPY to cecum and t.i. with biopsies;  Surgeon: Tong Aly MD;  Location: [facility] ENDOSCOPY;  Service: Gastroenterology;  Laterality: N/A;  pre-  abd pain, n/v, diarrhea  post- normal    ENDOSCOPY N/A 07/18/2020    Procedure: ESOPHAGOGASTRODUODENOSCOPY with biopsies;  Surgeon: Tong Aly MD;  Location: [facility] ENDOSCOPY;  Service: Gastroenterology;  Laterality: N/A;  pre- abd pain, n/v, diarrhea  post- gastritis       Family History   Problem Relation Age of Onset    Anxiety disorder Mother     Mental illness Father         Brain Damage causing mental issues    Prostate cancer Neg Hx     Colon cancer Neg Hx     Breast cancer Neg Hx        Social History     Socioeconomic History    Marital status: Single   Tobacco Use    Smoking status: Never    Smokeless tobacco: Never   Vaping Use    Vaping Use: Never used   Substance and Sexual Activity    Alcohol use: No    Drug use: No    Sexual activity: Never           Objective   Physical Exam  Vitals and nursing note reviewed.   Constitutional:       Appearance: Normal appearance.   HENT:      Head: Normocephalic and atraumatic.   Pulmonary:      Effort: Pulmonary effort is normal.   Musculoskeletal:       Cervical back: Normal range of motion and neck supple.   Skin:     General: Skin is warm and dry.      Capillary Refill: Capillary refill takes less than 2 seconds.      Comments: 2 sutures noted to right eyebrow   Neurological:      General: No focal deficit present.      Mental Status: He is alert and oriented to person, place, and time.       Procedures           ED Course                                           Medical Decision Making  Sutures removed without difficulty.  Wound reveals normal healing.  Given strict return precautions.    Problems Addressed:  Visit for suture removal: acute illness or injury        Final diagnoses:   Visit for suture removal       ED Disposition  ED Disposition       ED Disposition   Discharge    Condition   Stable    Comment   --               Charito Avila III, NP-C  [address] [address]  [phone]    Call   As needed         Medication List      No changes were made to your prescriptions during this visit.

## 2023-07-11 ENCOUNTER — TELEPHONE (OUTPATIENT)
Dept: GASTROENTEROLOGY | Facility: CLINIC | Age: 23
End: 2023-07-11

## 2023-07-11 NOTE — TELEPHONE ENCOUNTER
Returned call to pt who stated he is feeling good, just fearful the pain will return when he completes the antibiotics.    He is asking which medication he should resume after he finishes the antibiotics, the dicyclomine or hyoscyamine?    He is still taking the nortriptyline.

## 2023-07-11 NOTE — TELEPHONE ENCOUNTER
Tong Aly MD   to Dominique Fernandez RN  [facility] [address]       4:35 PM   I called him and left a message.       Please call him and tell him that I would recommend that he finish the antibiotics that he is on and then stop the antibiotics.  He will be off the antibiotics for couple of days when I see him next and I will be curious how he is doing off the antibiotics.    **VM to pt with request to contact office.  Christy HOYOS RN.

## 2023-07-11 NOTE — TELEPHONE ENCOUNTER
Caller: Wilber Hopkins    Relationship to patient: Self    Best call back number: [phone]    Patient is needing: PATIENT CALLED IN, HE HAS AN APPOINTMENT WITH DR BRUNO ON 7/17/23 AND HE WILL BE OUT OF HIS ANTIBIOTICS AS OF 7/14/23, HE IS WONDERING WHAT DR BRUNO WOULD LIKE HIM TO DO FOR SATURDAY AND SUNDAY. HIS PHARMACY IS [id number] [place] [phone] [address] [phone] [facility] [phone]. HE WOULD LIKE A CALL PLEASE TO DISCUSS THIS. YOU CAN LEAVE A DETAILED MESSAGE IF YOU NEED TO.

## 2023-07-17 PROBLEM — R93.5 ABNORMAL CT OF THE ABDOMEN: Status: ACTIVE | Noted: 2023-07-17

## 2023-07-19 PROBLEM — F41.8 OTHER SPECIFIED ANXIETY DISORDERS: Chronic | Status: ACTIVE | Noted: 2020-07-03

## 2023-08-03 ENCOUNTER — TELEPHONE (OUTPATIENT)
Dept: GASTROENTEROLOGY | Facility: CLINIC | Age: 23
End: 2023-08-03
Payer: COMMERCIAL

## 2023-08-03 ENCOUNTER — APPOINTMENT (OUTPATIENT)
Dept: CT IMAGING | Facility: HOSPITAL | Age: 23
End: 2023-08-03
Payer: COMMERCIAL

## 2023-08-03 ENCOUNTER — HOSPITAL ENCOUNTER (EMERGENCY)
Facility: HOSPITAL | Age: 23
Discharge: HOME | End: 2023-08-03
Attending: EMERGENCY MEDICINE
Payer: COMMERCIAL

## 2023-08-03 VITALS
RESPIRATION RATE: 18 BRPM | BODY MASS INDEX: 19.6 KG/M2 | HEIGHT: 71 IN | SYSTOLIC BLOOD PRESSURE: 133 MMHG | OXYGEN SATURATION: 90 % | WEIGHT: 140 LBS | HEART RATE: 71 BPM | TEMPERATURE: 98.1 F | DIASTOLIC BLOOD PRESSURE: 75 MMHG

## 2023-08-03 DIAGNOSIS — R11.0 NAUSEA: ICD-10-CM

## 2023-08-03 DIAGNOSIS — K62.89 PROCTITIS: Primary | ICD-10-CM

## 2023-08-03 LAB
ALBUMIN SERPL-MCNC: 4.4 G/DL (ref 3.5–5.2)
ALBUMIN/GLOB SERPL: 1.9 G/DL
ALP SERPL-CCNC: 66 U/L (ref 39–117)
ALT SERPL W P-5'-P-CCNC: 16 U/L (ref 1–41)
ANION GAP SERPL CALCULATED.3IONS-SCNC: 8.5 MMOL/L (ref 5–15)
AST SERPL-CCNC: 21 U/L (ref 1–40)
BACTERIA UR QL AUTO: NORMAL /HPF
BASOPHILS # BLD AUTO: 0.03 10*3/MM3 (ref 0–0.2)
BASOPHILS NFR BLD AUTO: 0.7 % (ref 0–1.5)
BILIRUB SERPL-MCNC: 0.7 MG/DL (ref 0–1.2)
BILIRUB UR QL STRIP: NEGATIVE
BUN SERPL-MCNC: 14 MG/DL (ref 6–20)
BUN/CREAT SERPL: 13.9 (ref 7–25)
CALCIUM SPEC-SCNC: 9.4 MG/DL (ref 8.6–10.5)
CHLORIDE SERPL-SCNC: 103 MMOL/L (ref 98–107)
CLARITY UR: ABNORMAL
CO2 SERPL-SCNC: 26.5 MMOL/L (ref 22–29)
COLOR UR: YELLOW
CREAT SERPL-MCNC: 1.01 MG/DL (ref 0.76–1.27)
DEPRECATED RDW RBC AUTO: 42.4 FL (ref 37–54)
EGFRCR SERPLBLD CKD-EPI 2021: 107.8 ML/MIN/1.73
EOSINOPHIL # BLD AUTO: 0.01 10*3/MM3 (ref 0–0.4)
EOSINOPHIL NFR BLD AUTO: 0.2 % (ref 0.3–6.2)
ERYTHROCYTE [DISTWIDTH] IN BLOOD BY AUTOMATED COUNT: 13.2 % (ref 12.3–15.4)
GLOBULIN UR ELPH-MCNC: 2.3 GM/DL
GLUCOSE SERPL-MCNC: 104 MG/DL (ref 65–99)
GLUCOSE UR STRIP-MCNC: NEGATIVE MG/DL
HCT VFR BLD AUTO: 44.3 % (ref 37.5–51)
HGB BLD-MCNC: 15.4 G/DL (ref 13–17.7)
HGB UR QL STRIP.AUTO: NEGATIVE
HYALINE CASTS UR QL AUTO: NORMAL /LPF
IMM GRANULOCYTES # BLD AUTO: 0.02 10*3/MM3 (ref 0–0.05)
IMM GRANULOCYTES NFR BLD AUTO: 0.4 % (ref 0–0.5)
KETONES UR QL STRIP: NEGATIVE
LEUKOCYTE ESTERASE UR QL STRIP.AUTO: ABNORMAL
LIPASE SERPL-CCNC: 23 U/L (ref 13–60)
LYMPHOCYTES # BLD AUTO: 1.08 10*3/MM3 (ref 0.7–3.1)
LYMPHOCYTES NFR BLD AUTO: 23.7 % (ref 19.6–45.3)
MCH RBC QN AUTO: 30.8 PG (ref 26.6–33)
MCHC RBC AUTO-ENTMCNC: 34.8 G/DL (ref 31.5–35.7)
MCV RBC AUTO: 88.6 FL (ref 79–97)
MONOCYTES # BLD AUTO: 0.38 10*3/MM3 (ref 0.1–0.9)
MONOCYTES NFR BLD AUTO: 8.3 % (ref 5–12)
NEUTROPHILS NFR BLD AUTO: 3.04 10*3/MM3 (ref 1.7–7)
NEUTROPHILS NFR BLD AUTO: 66.7 % (ref 42.7–76)
NITRITE UR QL STRIP: NEGATIVE
NRBC BLD AUTO-RTO: 0 /100 WBC (ref 0–0.2)
PH UR STRIP.AUTO: 7.5 [PH] (ref 5–8)
PLATELET # BLD AUTO: 153 10*3/MM3 (ref 140–450)
PMV BLD AUTO: 11.8 FL (ref 6–12)
POTASSIUM SERPL-SCNC: 3.7 MMOL/L (ref 3.5–5.2)
PROT SERPL-MCNC: 6.7 G/DL (ref 6–8.5)
PROT UR QL STRIP: NEGATIVE
RBC # BLD AUTO: 5 10*6/MM3 (ref 4.14–5.8)
RBC # UR STRIP: NORMAL /HPF
REF LAB TEST METHOD: NORMAL
SODIUM SERPL-SCNC: 138 MMOL/L (ref 136–145)
SP GR UR STRIP: 1.02 (ref 1–1.03)
SQUAMOUS #/AREA URNS HPF: NORMAL /HPF
UROBILINOGEN UR QL STRIP: ABNORMAL
WBC # UR STRIP: NORMAL /HPF
WBC NRBC COR # BLD: 4.56 10*3/MM3 (ref 3.4–10.8)

## 2023-08-03 PROCEDURE — 85025 COMPLETE CBC W/AUTO DIFF WBC: CPT | Performed by: PHYSICIAN ASSISTANT

## 2023-08-03 PROCEDURE — 83690 ASSAY OF LIPASE: CPT | Performed by: PHYSICIAN ASSISTANT

## 2023-08-03 PROCEDURE — 99285 EMERGENCY DEPT VISIT HI MDM: CPT

## 2023-08-03 PROCEDURE — 96374 THER/PROPH/DIAG INJ IV PUSH: CPT

## 2023-08-03 PROCEDURE — 25510000001 IOPAMIDOL 61 % SOLUTION: Performed by: EMERGENCY MEDICINE

## 2023-08-03 PROCEDURE — 81001 URINALYSIS AUTO W/SCOPE: CPT | Performed by: PHYSICIAN ASSISTANT

## 2023-08-03 PROCEDURE — 74177 CT ABD & PELVIS W/CONTRAST: CPT

## 2023-08-03 PROCEDURE — 25010000002 ONDANSETRON PER 1 MG: Performed by: PHYSICIAN ASSISTANT

## 2023-08-03 PROCEDURE — 80053 COMPREHEN METABOLIC PANEL: CPT | Performed by: PHYSICIAN ASSISTANT

## 2023-08-03 RX ORDER — ONDANSETRON 2 MG/ML
4 INJECTION INTRAMUSCULAR; INTRAVENOUS ONCE
Status: COMPLETED | OUTPATIENT
Start: 2023-08-03 | End: 2023-08-03

## 2023-08-03 RX ORDER — ONDANSETRON 4 MG/1
4 TABLET, ORALLY DISINTEGRATING ORAL EVERY 8 HOURS PRN
Qty: 12 TABLET | Refills: 0 | Status: SHIPPED | OUTPATIENT
Start: 2023-08-03

## 2023-08-03 RX ORDER — DICYCLOMINE HYDROCHLORIDE 10 MG/1
10 CAPSULE ORAL
COMMUNITY

## 2023-08-03 RX ORDER — METRONIDAZOLE 500 MG/1
500 TABLET ORAL 3 TIMES DAILY
Qty: 30 TABLET | Refills: 0 | Status: SHIPPED | OUTPATIENT
Start: 2023-08-03 | End: 2023-08-13

## 2023-08-03 RX ADMIN — IOPAMIDOL 85 ML: 612 INJECTION, SOLUTION INTRAVENOUS at 12:39

## 2023-08-03 RX ADMIN — ONDANSETRON 4 MG: 2 INJECTION INTRAMUSCULAR; INTRAVENOUS at 11:49

## 2023-08-03 NOTE — TELEPHONE ENCOUNTER
"Pt stated for the last 3 days he has been having severe pain when ever he has a bowel movement.  He stated he has not seen any blood or dark stool.  Stated his stool is \"green\"  he is rating the pain a 7/10 now, but stated with bowel movements it is a 9 or a 10.  He has to strain to pass the stool.  He is also experiencing abd pain \"upper and lower\"  he is also experiencing nausea.     Pt stated he has a hx of proctitis, and his symptoms seem to be similar to this.    He has tried bentyl, and stated it didn't help  He has taking metamucil and stated he is alternating between diarrhea and constipation.  MiraLax is on his list, pt stated he was given this by the ER and is no longer taking this.  Stated he can resume if you think he should take this daily.    Pt was advised to go to the ER if his symptoms worsen and he is agreeable.  Advised message to Dr Aly to advise on his symptoms.  "

## 2023-08-03 NOTE — TELEPHONE ENCOUNTER
Hub staff attempted to follow warm transfer process and was unsuccessful     Caller: Wilber Hopkins    Relationship to patient: Self    Best call back number: [phone]     Patient is needing: PT CALLING TO REPORT FOR THE LAST 3 DAYS HE IS HAVING VERY PAINFUL BOWEL MOVEMENTS, NAUSEA AND CONSTANT ABD PAIN. PT WONDERING IF THERE IS ANYTHING HE CAN DO FOR IT BEFORE HE HAS TO GO TO URGENT CARE OR ER. PLEASE CALL PT BACK AND ADVISE. WARM TRANSFER UNSUCCESSFUL.

## 2023-08-03 NOTE — ED PROVIDER NOTES
EMERGENCY DEPARTMENT ENCOUNTER    Room Number:  25/25  Date of encounter:  8/3/2023  PCP: Charito Avila III, NP-C  Patient Care Team:  Charito Avila III, NP-C as PCP - General (Internal Medicine)   Independent Historians: Patient    HPI:  Chief Complaint: Abdominal pain   A complete HPI/ROS/PMH/PSH/SH/FH are unobtainable due to: None    Chronic or social conditions impacting patient care (social determinants of health): None    Context: Wilber Hopkins is a 23 y.o. male who presents to the ED c/o upper and lower abdominal pain and increased pain with bowel movements.  Patient reports IBS with alternating diarrhea and constipation and sees Dr. Aly with GI.  He recently was treated for proctitis and had improvement on antibiotics but states about 3 days after being off antibiotics began having the pain again.  He has not had any blood in his bowel movements and denies any fevers.  He reports he is having bowel movements and they seem to be soft/normal but are painful.  No trauma reported.  He has nausea but denies vomiting.  He is taking Bentyl but does not provide any relief.  He is scheduled to undergo endoscopy later in September.    Review of prior external notes (non-ED): Office visit with PCP LOLIS Avila on 7/19/23 for annual exam.  History of GERD/gastritis.  Being followed by Dr. Aly with GI for alternating constipation/diarrhea. Takes bentyl prn.    Review of prior external test results outside of this encounter: CT abd/pelvis on 7/8/23 showed questionable mild wall thickening involving the rectum. Proctitis not excluded. Increased stool burden, suggesting constipation.    PAST MEDICAL HISTORY  Active Ambulatory Problems     Diagnosis Date Noted    Diarrhea 07/08/2020    Constipation 07/08/2020    Nausea and vomiting 07/08/2020    Gastroesophageal reflux disease 07/08/2020    Paresthesia 03/31/2022    Acute gastritis 07/03/2020    Other specified anxiety disorders 07/03/2020     Irritable bowel syndrome with diarrhea 01/24/2023    Abnormal CT of the abdomen 07/17/2023     Resolved Ambulatory Problems     Diagnosis Date Noted    Pain of upper abdomen 07/08/2020     Past Medical History:   Diagnosis Date    Anxiety     Depression     Gastritis     GERD (gastroesophageal reflux disease)     IBS (irritable bowel syndrome)     OCD (obsessive compulsive disorder)     Panic attack     Proctitis     PTSD (post-traumatic stress disorder)        The patient has started, but not completed, their COVID-19 vaccination series.    PAST SURGICAL HISTORY  Past Surgical History:   Procedure Laterality Date    COLONOSCOPY N/A 07/18/2020    Procedure: COLONOSCOPY to cecum and t.i. with biopsies;  Surgeon: Tong Aly MD;  Location: [facility] ENDOSCOPY;  Service: Gastroenterology;  Laterality: N/A;  pre-  abd pain, n/v, diarrhea  post- normal    ENDOSCOPY N/A 07/18/2020    Procedure: ESOPHAGOGASTRODUODENOSCOPY with biopsies;  Surgeon: Tong Aly MD;  Location: [facility] ENDOSCOPY;  Service: Gastroenterology;  Laterality: N/A;  pre- abd pain, n/v, diarrhea  post- gastritis         FAMILY HISTORY  Family History   Problem Relation Age of Onset    Anxiety disorder Mother     Mental illness Father         Brain Damage causing mental issues    Prostate cancer Neg Hx     Colon cancer Neg Hx     Breast cancer Neg Hx          SOCIAL HISTORY  Social History     Socioeconomic History    Marital status: Single   Tobacco Use    Smoking status: Never    Smokeless tobacco: Never   Vaping Use    Vaping Use: Never used   Substance and Sexual Activity    Alcohol use: No    Drug use: No    Sexual activity: Defer         ALLERGIES  Amoxicillin and Penicillins        REVIEW OF SYSTEMS  Review of Systems   Constitutional:  Negative for chills and fever.   Respiratory:  Negative for shortness of breath.    Cardiovascular:  Negative for chest pain.   Gastrointestinal:  Positive for abdominal pain and nausea. Negative for blood in  stool and vomiting.   Genitourinary:  Negative for dysuria.      All systems reviewed and negative except for those discussed in HPI.       PHYSICAL EXAM    I have reviewed the triage vital signs and nursing notes.    ED Triage Vitals [08/03/23 1118]   Temp Heart Rate Resp BP SpO2   98.1 øF (36.7 øC) 94 18 -- 98 %      Temp src Heart Rate Source Patient Position BP Location FiO2 (%)   Tympanic -- -- -- --       Physical Exam  GENERAL: alert, no acute distress  SKIN: Warm, dry  HENT: Normocephalic, atraumatic  EYES: no scleral icterus  CV: regular rhythm, regular rate  RESPIRATORY: normal effort, lungs clear  ABDOMEN: soft, generalized discomfort to palpation, nondistended  MUSCULOSKELETAL: no deformity  NEURO: alert, moves all extremities, follows commands          LAB RESULTS  Recent Results (from the past 24 hour(s))   Comprehensive Metabolic Panel    Collection Time: 08/03/23 11:50 AM    Specimen: Blood   Result Value Ref Range    Glucose 104 (H) 65 - 99 mg/dL    BUN 14 6 - 20 mg/dL    Creatinine 1.01 0.76 - 1.27 mg/dL    Sodium 138 136 - 145 mmol/L    Potassium 3.7 3.5 - 5.2 mmol/L    Chloride 103 98 - 107 mmol/L    CO2 26.5 22.0 - 29.0 mmol/L    Calcium 9.4 8.6 - 10.5 mg/dL    Total Protein 6.7 6.0 - 8.5 g/dL    Albumin 4.4 3.5 - 5.2 g/dL    ALT (SGPT) 16 1 - 41 U/L    AST (SGOT) 21 1 - 40 U/L    Alkaline Phosphatase 66 39 - 117 U/L    Total Bilirubin 0.7 0.0 - 1.2 mg/dL    Globulin 2.3 gm/dL    A/G Ratio 1.9 g/dL    BUN/Creatinine Ratio 13.9 7.0 - 25.0    Anion Gap 8.5 5.0 - 15.0 mmol/L    eGFR 107.8 >60.0 mL/min/1.73   Lipase    Collection Time: 08/03/23 11:50 AM    Specimen: Blood   Result Value Ref Range    Lipase 23 13 - 60 U/L   CBC Auto Differential    Collection Time: 08/03/23 11:50 AM    Specimen: Blood   Result Value Ref Range    WBC 4.56 3.40 - 10.80 10*3/mm3    RBC 5.00 4.14 - 5.80 10*6/mm3    Hemoglobin 15.4 13.0 - 17.7 g/dL    Hematocrit 44.3 37.5 - 51.0 %    MCV 88.6 79.0 - 97.0 fL    MCH 30.8  26.6 - 33.0 pg    MCHC 34.8 31.5 - 35.7 g/dL    RDW 13.2 12.3 - 15.4 %    RDW-SD 42.4 37.0 - 54.0 fl    MPV 11.8 6.0 - 12.0 fL    Platelets 153 140 - 450 10*3/mm3    Neutrophil % 66.7 42.7 - 76.0 %    Lymphocyte % 23.7 19.6 - 45.3 %    Monocyte % 8.3 5.0 - 12.0 %    Eosinophil % 0.2 (L) 0.3 - 6.2 %    Basophil % 0.7 0.0 - 1.5 %    Immature Grans % 0.4 0.0 - 0.5 %    Neutrophils, Absolute 3.04 1.70 - 7.00 10*3/mm3    Lymphocytes, Absolute 1.08 0.70 - 3.10 10*3/mm3    Monocytes, Absolute 0.38 0.10 - 0.90 10*3/mm3    Eosinophils, Absolute 0.01 0.00 - 0.40 10*3/mm3    Basophils, Absolute 0.03 0.00 - 0.20 10*3/mm3    Immature Grans, Absolute 0.02 0.00 - 0.05 10*3/mm3    nRBC 0.0 0.0 - 0.2 /100 WBC   Urinalysis With Microscopic If Indicated (No Culture) - Urine, Clean Catch    Collection Time: 08/03/23 11:51 AM    Specimen: Urine, Clean Catch   Result Value Ref Range    Color, UA Yellow Yellow, Straw    Appearance, UA Cloudy (A) Clear    pH, UA 7.5 5.0 - 8.0    Specific Gravity, UA 1.018 1.005 - 1.030    Glucose, UA Negative Negative    Ketones, UA Negative Negative    Bilirubin, UA Negative Negative    Blood, UA Negative Negative    Protein, UA Negative Negative    Leuk Esterase, UA Small (1+) (A) Negative    Nitrite, UA Negative Negative    Urobilinogen, UA 0.2 E.U./dL 0.2 - 1.0 E.U./dL   Urinalysis, Microscopic Only - Urine, Clean Catch    Collection Time: 08/03/23 11:51 AM    Specimen: Urine, Clean Catch   Result Value Ref Range    RBC, UA 0-2 None Seen, 0-2 /HPF    WBC, UA 0-2 None Seen, 0-2 /HPF    Bacteria, UA None Seen None Seen /HPF    Squamous Epithelial Cells, UA 0-2 None Seen, 0-2 /HPF    Hyaline Casts, UA 0-2 None Seen /LPF    Methodology Automated Microscopy        Ordered the above labs and independently reviewed and interpreted the results.        RADIOLOGY  CT Abdomen Pelvis With Contrast    Result Date: 8/3/2023  CT ABDOMEN AND PELVIS WITH IV CONTRAST  HISTORY: 23-year-old male with recent proctitis.  Pain with bowel movement. Irritable bowel history.  TECHNIQUE: Radiation dose reduction techniques were utilized, including automated exposure control and exposure modulation based on body size. 3 mm images were obtained through the abdomen and pelvis after the administration of IV contrast. Compared with previous CT 07/08/2023.  FINDINGS: The liver, gallbladder, pancreas, spleen, adrenals, and kidneys appear unremarkable. There is a much smaller volume of formed stool within the colon than previously and there are some segments of colon which are collapsed. The cecum appears mildly thickened, but this may be related to being partially collapsed. There is no significant change in the mild to moderately thickened appearance of the distal rectum. There is no surrounding inflammatory change or fluid. Shotty retroperitoneal nodes are stable and are likely reactive. Urinary bladder wall appears thickened, but the bladder is partially collapsed.      1. There is likely persistent or recurrent mild-moderate proctitis. There is no free fluid or fluid collection. 2. Thickened urinary bladder wall. Please correlate clinically for acute UTI/cystitis.       I ordered the above noted radiological studies. Independently reviewed and interpreted by me.  See dictation for official radiology interpretation.      PROCEDURES    Procedures      MEDICATIONS GIVEN IN ER    Medications   ondansetron (ZOFRAN) injection 4 mg (4 mg Intravenous Given 8/3/23 1149)   iopamidol (ISOVUE-300) 61 % injection 100 mL (85 mL Intravenous Given by Other 8/3/23 1239)         PROGRESS, DATA ANALYSIS, CONSULTS, AND MEDICAL DECISION MAKING    All labs have been independently reviewed and interpreted by me.  All radiology studies have been independently reviewed and interpreted by me and discussed with radiologist dictating the report.   EKG's independently reviewed and interpreted by me.  Discussion below represents my analysis of pertinent findings related  to patient's condition, differential diagnosis, treatment plan and final disposition.    My differential diagnosis for abdominal pain includes but is not limited to:    Gastritis, gastroenteritis, peptic ulcer disease, GERD, esophageal perforation, acute appendicitis, mesenteric adenitis, Meckel's diverticulum, epiploic appendagitis, diverticulitis, colon cancer, ulcerative colitis, Crohn's disease, intussusception, small bowel obstruction, adhesions, ischemic bowel, perforated viscus, ileus, obstipation, biliary colic, cholecystitis, cholelithiasis, hepatitis, pancreatitis, common bile duct obstruction, cholangitis, bile leak, splenic trauma, splenic rupture, splenic infarction, splenic abscess, abdominal abscess, ascites, spontaneous bacterial peritonitis, hernia, UTI, cystitis, prostatitis, ureterolithiasis, urinary obstruction, pelvic abscess, AAA, myocardial infarction, pneumonia, cancer, porphyria, DKA, medications, sickle cell, viral syndrome, zoster      ED Course as of 08/03/23 2109   Thu Aug 03, 2023   1208 WBC: 4.56 [DC]   1208 Hemoglobin: 15.4 [DC]   1208 Hematocrit: 44.3 [DC]   1208 Platelets: 153 [DC]   1242 Creatinine: 1.01 [DC]   1242 Sodium: 138 [DC]   1242 Potassium: 3.7 [DC]   1242 Lipase: 23 [DC]   1315 CT Abdomen Pelvis With Contrast  Radiology study independently interpreted by me and my findings are no free air.   [DC]   1400 Patient's labs are reassuring.  He denies any anal trauma or concern for STI exposure.  I suspect he has a reoccurrence of his proctitis as he did initially improve on antibiotics and then worsen after he had completed 7-day course.  Will restart on Flagyl and encouraged him to follow-up closely with his established GI doctor and PCP.  Return precautions given. [DC]      ED Course User Index  [DC] Alexandrea Souza PA           PPE: Patient was placed in face mask in first look. Patient was wearing facemask when I entered the room and throughout our encounter. I wore full  protective equipment throughout this patient encounter including a face mask, and gloves. Hand hygiene was performed before donning protective equipment and after removal when leaving the room.        AS OF 21:09 EDT VITALS:    BP - 133/75  HR - 71  TEMP - 98.1 øF (36.7 øC) (Tympanic)  O2 SATS - 90%        DIAGNOSIS  Final diagnoses:   Proctitis   Nausea         DISPOSITION  ED Disposition       ED Disposition   Discharge    Condition   Stable    Comment   --                  Note Disclaimer: At [facility], we believe that sharing information builds trust and better relationships. You are receiving this note because you recently visited [facility]. It is possible you will see health information before a provider has talked with you about it. This kind of information can be easy to misunderstand. To help you fully understand what it means for your health, we urge you to discuss this note with your provider.         Alexandrea Souza PA  08/03/23 [id number]

## 2023-08-04 NOTE — ED PROVIDER NOTES
The RAMANDEEP and I have discussed this patient's history, physical exam and treatment plan.  I provided a substantive portion of the care of this patient.  I have reviewed the documentation and personally had a face to face interaction with the patient and personally performed the physical exam, in its entirety.  I affirm the documentation and agree with the treatment and plan.  The following describes my personal findings.      The patient presents complaining of abdominal pain lower mid abdomen intermittent, worse just prior to bowel movements.  Patient with history of proctitis, finished antibiotics 2 weeks ago and reports his symptoms have escalated over the past approximately 9 days.  Patient denies fever, nausea, vomiting, rectal intercourse.    Comprehensive Physical exam:  Patient is nontoxic appearing oriented, conversant awake, alert  HEENT: normocephalic, atraumatic  Neck: no goiter, no pain with ROM  Pulmonary: Nontachypneic, breath sounds heard well bilaterally  cardiovascular: Nontachycardic  Abdomen: Positive bowel sounds, mild discomfort to deep palpation of suprapubic area, no guarding or rebound  musculoskeletal: Good range of motion, pulse, sensation x4  Neuro/psychiatric:calm, appropriate, cooperative  Skin:warm, dry      Dissipate outpatient follow-up with Dr. Vilhcis for recheck, further testing, treatment as needed, continue probiotics and return to emergency department immediately with persistent or profuse bloody stool, fevers, vomiting, escalating pain or other concerns.     Macarena Valenzuela MD  08/04/23 [id number]

## 2023-08-08 NOTE — TELEPHONE ENCOUNTER
BOBO Kennedy for COLONOSCOPY/EGD on 8/14/23 arrive at 2pm  .pt already has prep instructions....miralax

## 2023-08-11 ENCOUNTER — TELEPHONE (OUTPATIENT)
Dept: GASTROENTEROLOGY | Facility: CLINIC | Age: 23
End: 2023-08-11
Payer: COMMERCIAL

## 2023-08-11 NOTE — TELEPHONE ENCOUNTER
Caller: Wilber Hopkins    Relationship: Self    Best call back number: [phone]    What is the best time to reach you: ANYTIME    Who are you requesting to speak with (clinical staff, provider,  specific staff member): CLINICAL      What was the call regarding: PATIENT HAS SOME PREP QUESTIONS HE'S LIKE ANSWERED. WHEN IS HE SUPPOSED TO START HIS PREP FOR HIS C/S AND HE'S CONFUSED BECAUSE THE ENDO INSTRUCTIONS SAY NOTHING AFTER MIDNIGHT. HE WOULD LIKE TO DISCUSS. UNABLE TO WT. SCOPES ARE ON BOTH ON 08/14/23

## 2023-08-14 ENCOUNTER — ANESTHESIA (OUTPATIENT)
Dept: GASTROENTEROLOGY | Facility: HOSPITAL | Age: 23
End: 2023-08-14
Payer: COMMERCIAL

## 2023-08-14 ENCOUNTER — HOSPITAL ENCOUNTER (OUTPATIENT)
Facility: HOSPITAL | Age: 23
Setting detail: HOSPITAL OUTPATIENT SURGERY
Discharge: HOME | End: 2023-08-14
Attending: INTERNAL MEDICINE | Admitting: INTERNAL MEDICINE
Payer: COMMERCIAL

## 2023-08-14 ENCOUNTER — ANESTHESIA EVENT (OUTPATIENT)
Dept: GASTROENTEROLOGY | Facility: HOSPITAL | Age: 23
End: 2023-08-14
Payer: COMMERCIAL

## 2023-08-14 VITALS
WEIGHT: 136.91 LBS | HEIGHT: 71 IN | OXYGEN SATURATION: 98 % | RESPIRATION RATE: 17 BRPM | HEART RATE: 65 BPM | DIASTOLIC BLOOD PRESSURE: 43 MMHG | BODY MASS INDEX: 19.17 KG/M2 | SYSTOLIC BLOOD PRESSURE: 126 MMHG

## 2023-08-14 DIAGNOSIS — R93.5 ABNORMAL CT OF THE ABDOMEN: ICD-10-CM

## 2023-08-14 DIAGNOSIS — K59.00 CONSTIPATION, UNSPECIFIED CONSTIPATION TYPE: ICD-10-CM

## 2023-08-14 DIAGNOSIS — K21.9 GASTROESOPHAGEAL REFLUX DISEASE, UNSPECIFIED WHETHER ESOPHAGITIS PRESENT: ICD-10-CM

## 2023-08-14 PROCEDURE — 88305 TISSUE EXAM BY PATHOLOGIST: CPT | Performed by: INTERNAL MEDICINE

## 2023-08-14 PROCEDURE — 25010000002 PROPOFOL 10 MG/ML EMULSION: Performed by: STUDENT IN AN ORGANIZED HEALTH CARE EDUCATION/TRAINING PROGRAM

## 2023-08-14 PROCEDURE — 43239 EGD BIOPSY SINGLE/MULTIPLE: CPT | Performed by: INTERNAL MEDICINE

## 2023-08-14 PROCEDURE — 45380 COLONOSCOPY AND BIOPSY: CPT | Performed by: INTERNAL MEDICINE

## 2023-08-14 RX ORDER — PROPOFOL 10 MG/ML
VIAL (ML) INTRAVENOUS AS NEEDED
Status: DISCONTINUED | OUTPATIENT
Start: 2023-08-14 | End: 2023-08-14 | Stop reason: SURG

## 2023-08-14 RX ORDER — SODIUM CHLORIDE, SODIUM LACTATE, POTASSIUM CHLORIDE, CALCIUM CHLORIDE 600; 310; 30; 20 MG/100ML; MG/100ML; MG/100ML; MG/100ML
INJECTION, SOLUTION INTRAVENOUS CONTINUOUS PRN
Status: DISCONTINUED | OUTPATIENT
Start: 2023-08-14 | End: 2023-08-14 | Stop reason: SURG

## 2023-08-14 RX ORDER — SODIUM CHLORIDE, SODIUM LACTATE, POTASSIUM CHLORIDE, CALCIUM CHLORIDE 600; 310; 30; 20 MG/100ML; MG/100ML; MG/100ML; MG/100ML
1000 INJECTION, SOLUTION INTRAVENOUS CONTINUOUS
Status: DISCONTINUED | OUTPATIENT
Start: 2023-08-14 | End: 2023-08-14 | Stop reason: HOSPADM

## 2023-08-14 RX ORDER — LIDOCAINE HYDROCHLORIDE 20 MG/ML
INJECTION, SOLUTION INFILTRATION; PERINEURAL AS NEEDED
Status: DISCONTINUED | OUTPATIENT
Start: 2023-08-14 | End: 2023-08-14 | Stop reason: SURG

## 2023-08-14 RX ORDER — PROPOFOL 10 MG/ML
VIAL (ML) INTRAVENOUS CONTINUOUS PRN
Status: DISCONTINUED | OUTPATIENT
Start: 2023-08-14 | End: 2023-08-14 | Stop reason: SURG

## 2023-08-14 RX ORDER — SODIUM CHLORIDE 0.9 % (FLUSH) 0.9 %
10 SYRINGE (ML) INJECTION AS NEEDED
Status: DISCONTINUED | OUTPATIENT
Start: 2023-08-14 | End: 2023-08-14 | Stop reason: HOSPADM

## 2023-08-14 RX ADMIN — SODIUM CHLORIDE, POTASSIUM CHLORIDE, SODIUM LACTATE AND CALCIUM CHLORIDE: 600; 310; 30; 20 INJECTION, SOLUTION INTRAVENOUS at 14:46

## 2023-08-14 RX ADMIN — LIDOCAINE HYDROCHLORIDE 60 MG: 20 INJECTION, SOLUTION INFILTRATION; PERINEURAL at 14:46

## 2023-08-14 RX ADMIN — Medication 200 MCG/KG/MIN: at 14:53

## 2023-08-14 RX ADMIN — PROPOFOL 200 MG: 10 INJECTION, EMULSION INTRAVENOUS at 14:50

## 2023-08-14 RX ADMIN — SODIUM CHLORIDE, POTASSIUM CHLORIDE, SODIUM LACTATE AND CALCIUM CHLORIDE 1000 ML: 600; 310; 30; 20 INJECTION, SOLUTION INTRAVENOUS at 14:21

## 2023-08-14 NOTE — H&P
History of Present Illness:   23 y.o. male       His last EGD and colonoscopy were on 7/2020.         I last saw him in the office on 7/6/2023:  Assessment:  1. Lower abdominal pain.  2. Alternating diarrhea and consitpation.      Recommendations:  1. CT abd/pelvis  2. Labs.  3. F/u 3 mos.   4. Bentyl 20 mg 1-2 po QID prn abdominal cramping.        2 days later he went to the emergency room with abdominal pain and had a CAT scan of the abdomen and pelvis that showed:  IMPRESSION:     1. Questionable mild wall thickening involving the rectum. Proctitis is  not excluded.  2. Increased stool burden, suggesting constipation.     Radiation dose reduction techniques were utilized, including automated  exposure control and exposure modulation based on body size.     This report was finalized on 7/8/2023 2:21 AM by Dr. Laura Tamayo M.D.       The patient was put on Flagyl and miralax by the emergency room doctor.  I did review the lab work done in the emergency room on 7/8/2023.       The flagyl helped the abdominal pain. He feels pretty good with some lower abdominal pain. He is off the flagyl. He takes Pepcid BID. He stopped the MIralax yesterday. He had 3 BM yesterday and it was diarrhea. NO consitpation. nO rectal bleeding or melena. Some nausea but no vomiting. No fevers, chills. Weight stable.       Heartburn, on Pepcid 20 mg BID. No dysphagia.         Medical History        Past Medical History:   Diagnosis Date    Anxiety      Depression      Gastritis      GERD (gastroesophageal reflux disease)      IBS (irritable bowel syndrome)      OCD (obsessive compulsive disorder)      Panic attack      PTSD (post-traumatic stress disorder)              Surgical History         Past Surgical History:   Procedure Laterality Date    COLONOSCOPY N/A 07/18/2020     Procedure: COLONOSCOPY to cecum and t.i. with biopsies;  Surgeon: Tong Aly MD;  Location: [facility] ENDOSCOPY;  Service: Gastroenterology;  Laterality: N/A;   pre-  abd pain, n/v, diarrhea  post- normal    ENDOSCOPY N/A 07/18/2020     Procedure: ESOPHAGOGASTRODUODENOSCOPY with biopsies;  Surgeon: Tong Aly MD;  Location: [facility] ENDOSCOPY;  Service: Gastroenterology;  Laterality: N/A;  pre- abd pain, n/v, diarrhea  post- gastritis               Current Outpatient Medications:     buPROPion XL (WELLBUTRIN XL) 150 MG 24 hr tablet, Take 1 tablet by mouth Daily., Disp: , Rfl:     dicyclomine (BENTYL) 20 MG tablet, Take 1 tablet by mouth 3 (Three) Times a Day As Needed (abdominal pain and or cramping)., Disp: 90 tablet, Rfl: 3    hydrOXYzine pamoate (VISTARIL) 25 MG capsule, Take 1 capsule by mouth Daily As Needed., Disp: , Rfl:     hyoscyamine (ANASPAZ,LEVSIN) 0.125 MG tablet, Take 1 tablet by mouth 4 (Four) Times a Day After Meals & at Bedtime., Disp: 120 tablet, Rfl: 11    Multiple Vitamin (MULTIVITAMIN PO), Take  by mouth., Disp: , Rfl:     nortriptyline (Pamelor) 25 MG capsule, Take 1 capsule by mouth Every Night. (Patient not taking: Reported on 7/6/2023), Disp: 30 capsule, Rfl: 5    ondansetron ODT (ZOFRAN-ODT) 4 MG disintegrating tablet, Place 1 tablet on the tongue Every 6 (Six) Hours As Needed for Nausea or Vomiting., Disp: 30 tablet, Rfl: 0    polyethylene glycol (MIRALAX) 17 GM/SCOOP powder, Take 17 g by mouth Daily., Disp: 527 g, Rfl: 0    Saccharomyces boulardii (FLORASTORMAX PO), Take 20 mg by mouth. (Patient not taking: Reported on 7/6/2023), Disp: , Rfl:           Allergies   Allergen Reactions    Amoxicillin GI Intolerance    Penicillins GI Intolerance               Family History   Problem Relation Age of Onset    Anxiety disorder Mother      Mental illness Father           Brain Damage causing mental issues    Prostate cancer Neg Hx      Colon cancer Neg Hx      Breast cancer Neg Hx           Social History   Social History           Socioeconomic History    Marital status: Single   Tobacco Use    Smoking status: Never    Smokeless tobacco: Never    Vaping Use    Vaping Use: Never used   Substance and Sexual Activity    Alcohol use: No    Drug use: No    Sexual activity: Never            Review of Systems   Gastrointestinal:  Positive for constipation.   All other systems reviewed and are negative.  Pertinent positives and negatives documented in the HPI and all other systems reviewed and were found to be negative.  There were no vitals filed for this visit.     Physical Exam  Vitals reviewed.   Constitutional:       General: He is not in acute distress.     Appearance: Normal appearance. He is well-developed. He is not diaphoretic.   HENT:      Head: Normocephalic and atraumatic. Hair is normal.      Right Ear: Hearing, tympanic membrane, ear canal and external ear normal.      Left Ear: Hearing, tympanic membrane, ear canal and external ear normal.      Nose: Nose normal. No nasal deformity.      Mouth/Throat:      Mouth: Mucous membranes are moist. No oral lesions.      Pharynx: Uvula midline. No uvula swelling.   Eyes:      General: Lids are normal. No scleral icterus.        Right eye: No discharge.         Left eye: No discharge.      Extraocular Movements: Extraocular movements intact.      Right eye: Normal extraocular motion and no nystagmus.      Left eye: Normal extraocular motion and no nystagmus.      Conjunctiva/sclera: Conjunctivae normal.      Pupils: Pupils are equal, round, and reactive to light.   Neck:      Thyroid: No thyromegaly.      Vascular: No JVD.   Cardiovascular:      Rate and Rhythm: Normal rate and regular rhythm.      Pulses: Normal pulses.      Heart sounds: Normal heart sounds. No murmur heard.    No gallop.   Pulmonary:      Effort: Pulmonary effort is normal. No respiratory distress.      Breath sounds: Normal breath sounds. No wheezing or rales.   Chest:      Chest wall: No tenderness.   Abdominal:      General: Bowel sounds are normal. There is no distension.      Palpations: Abdomen is soft. There is no mass.       Tenderness: There is no abdominal tenderness. There is no guarding.      Hernia: No hernia is present.   Musculoskeletal:         General: No tenderness or deformity. Normal range of motion.      Cervical back: Normal range of motion and neck supple.   Lymphadenopathy:      Cervical: No cervical adenopathy.   Skin:     General: Skin is warm and dry.      Findings: No rash.   Neurological:      Mental Status: He is alert and oriented to person, place, and time.      Cranial Nerves: No cranial nerve deficit.      Motor: No abnormal muscle tone.      Coordination: Coordination normal.      Deep Tendon Reflexes: Reflexes are normal and symmetric. Reflexes normal.   Psychiatric:         Mood and Affect: Mood normal.         Behavior: Behavior normal.         Thought Content: Thought content normal.         Judgment: Judgment normal.         There are no diagnoses linked to this encounter.  Assessment:  Abnormal CAT scan of the abdomen and pelvis showing proctitis and constipation.   Consitpation  GERD     Recommendations:  TAke fiber BID  Colonosncopy + EGD     No follow-ups on file.     8/14/23 - No change from the above H and P.   Tong Aly MD

## 2023-08-14 NOTE — ANESTHESIA POSTPROCEDURE EVALUATION
"Patient: Wilber Hopkins    Procedure Summary       Date: 08/14/23 Room / Location:  [facility] /  [facility]    Anesthesia Start: 1442 Anesthesia Stop: 1525    Procedures:       ESOPHAGOGASTRODUODENOSCOPY with cold bxs (Esophagus)      COLONOSCOPY to cecum and ti Diagnosis:       Gastroesophageal reflux disease, unspecified whether esophagitis present      Constipation, unspecified constipation type      Abnormal CT of the abdomen      (Gastroesophageal reflux disease, unspecified whether esophagitis present [K21.9])      (Constipation, unspecified constipation type [K59.00])      (Abnormal CT of the abdomen [R93.5])    Surgeons: Tong Aly MD Provider: Sarbjit Harris MD    Anesthesia Type: MAC ASA Status: 2            Anesthesia Type: MAC    Vitals  Vitals Value Taken Time   /43 08/14/23 1541   Temp     Pulse 65 08/14/23 1541   Resp 17 08/14/23 1541   SpO2 98 % 08/14/23 1541           Post Anesthesia Care and Evaluation    Patient location during evaluation: bedside  Patient participation: complete - patient participated  Level of consciousness: awake and alert  Pain management: adequate    Airway patency: patent  Anesthetic complications: No anesthetic complications    Cardiovascular status: acceptable  Respiratory status: acceptable  Hydration status: acceptable    Comments: /43   Pulse 65   Resp 17   Ht 180.3 cm (71\")   Wt 62.1 kg (136 lb 14.5 oz)   SpO2 98%   BMI 19.09 kg/mý     "

## 2023-08-14 NOTE — ANESTHESIA PREPROCEDURE EVALUATION
Anesthesia Evaluation     Patient summary reviewed and Nursing notes reviewed   NPO Solid Status: > 8 hours             Airway   Mallampati: I  TM distance: >3 FB  Neck ROM: full  No difficulty expected  Dental - normal exam     Pulmonary - negative pulmonary ROS and normal exam   Cardiovascular - negative cardio ROS and normal exam    (-) hypertension, past MI, CAD, cardiac stents, CABG      Neuro/Psych  (+) psychiatric history Anxiety, PTSD and Depression  GI/Hepatic/Renal/Endo    (+) GERD well controlled    ROS Comment: Gastritis    Musculoskeletal (-) negative ROS    Abdominal  - normal exam    Bowel sounds: normal.   Substance History - negative use     OB/GYN          Other - negative ROS                       Anesthesia Plan    ASA 2     MAC     intravenous induction     Anesthetic plan, risks, benefits, and alternatives have been provided, discussed and informed consent has been obtained with: patient.      CODE STATUS:

## 2023-08-15 ENCOUNTER — TELEPHONE (OUTPATIENT)
Dept: GASTROENTEROLOGY | Facility: CLINIC | Age: 23
End: 2023-08-15
Payer: COMMERCIAL

## 2023-08-16 LAB
LAB AP CASE REPORT: NORMAL
LAB AP DIAGNOSIS COMMENT: NORMAL
PATH REPORT.FINAL DX SPEC: NORMAL
PATH REPORT.GROSS SPEC: NORMAL

## 2023-08-17 ENCOUNTER — TELEPHONE (OUTPATIENT)
Dept: GASTROENTEROLOGY | Facility: CLINIC | Age: 23
End: 2023-08-17
Payer: COMMERCIAL

## 2023-08-17 NOTE — PROGRESS NOTES
08/17/23       I called him with results of pathology.  Please send a copy of this report to his PCP.  He is scheduled to come see me in the office in follow-up in 10 of 2023.  Kassandra. [email or web address]

## 2023-08-17 NOTE — TELEPHONE ENCOUNTER
----- Message from Tong Aly MD sent at 8/17/2023 10:30 AM EDT -----  08/17/23       I called him with results of pathology.  Please send a copy of this report to his PCP.  He is scheduled to come see me in the office in follow-up in 10 of 2023.  Thx. kjh

## 2024-01-01 ENCOUNTER — HOSPITAL ENCOUNTER (EMERGENCY)
Facility: HOSPITAL | Age: 24
Discharge: HOME | End: 2024-01-01
Attending: STUDENT IN AN ORGANIZED HEALTH CARE EDUCATION/TRAINING PROGRAM | Admitting: STUDENT IN AN ORGANIZED HEALTH CARE EDUCATION/TRAINING PROGRAM
Payer: COMMERCIAL

## 2024-01-01 VITALS
HEART RATE: 95 BPM | BODY MASS INDEX: 19.6 KG/M2 | HEIGHT: 71 IN | TEMPERATURE: 98.2 F | DIASTOLIC BLOOD PRESSURE: 80 MMHG | OXYGEN SATURATION: 99 % | RESPIRATION RATE: 18 BRPM | WEIGHT: 140 LBS | SYSTOLIC BLOOD PRESSURE: 127 MMHG

## 2024-01-01 DIAGNOSIS — K29.70 GASTRITIS WITHOUT BLEEDING, UNSPECIFIED CHRONICITY, UNSPECIFIED GASTRITIS TYPE: Primary | ICD-10-CM

## 2024-01-01 LAB
ALBUMIN SERPL-MCNC: 5 G/DL (ref 3.5–5.2)
ALBUMIN/GLOB SERPL: 2.2 G/DL
ALP SERPL-CCNC: 104 U/L (ref 39–117)
ALT SERPL W P-5'-P-CCNC: 14 U/L (ref 1–41)
ANION GAP SERPL CALCULATED.3IONS-SCNC: 10.5 MMOL/L (ref 5–15)
AST SERPL-CCNC: 15 U/L (ref 1–40)
BASOPHILS # BLD AUTO: 0.02 10*3/MM3 (ref 0–0.2)
BASOPHILS NFR BLD AUTO: 0.2 % (ref 0–1.5)
BILIRUB SERPL-MCNC: 0.4 MG/DL (ref 0–1.2)
BILIRUB UR QL STRIP: NEGATIVE
BUN SERPL-MCNC: 19 MG/DL (ref 6–20)
BUN/CREAT SERPL: 17.4 (ref 7–25)
CALCIUM SPEC-SCNC: 9.8 MG/DL (ref 8.6–10.5)
CHLORIDE SERPL-SCNC: 103 MMOL/L (ref 98–107)
CLARITY UR: CLEAR
CO2 SERPL-SCNC: 24.5 MMOL/L (ref 22–29)
COLOR UR: YELLOW
CREAT SERPL-MCNC: 1.09 MG/DL (ref 0.76–1.27)
DEPRECATED RDW RBC AUTO: 38.2 FL (ref 37–54)
EGFRCR SERPLBLD CKD-EPI 2021: 98.4 ML/MIN/1.73
EOSINOPHIL # BLD AUTO: 0.01 10*3/MM3 (ref 0–0.4)
EOSINOPHIL NFR BLD AUTO: 0.1 % (ref 0.3–6.2)
ERYTHROCYTE [DISTWIDTH] IN BLOOD BY AUTOMATED COUNT: 11.7 % (ref 12.3–15.4)
FLUAV SUBTYP SPEC NAA+PROBE: NOT DETECTED
FLUBV RNA ISLT QL NAA+PROBE: NOT DETECTED
GLOBULIN UR ELPH-MCNC: 2.3 GM/DL
GLUCOSE SERPL-MCNC: 103 MG/DL (ref 65–99)
GLUCOSE UR STRIP-MCNC: NEGATIVE MG/DL
HCT VFR BLD AUTO: 45.8 % (ref 37.5–51)
HGB BLD-MCNC: 15.6 G/DL (ref 13–17.7)
HGB UR QL STRIP.AUTO: NEGATIVE
IMM GRANULOCYTES # BLD AUTO: 0.02 10*3/MM3 (ref 0–0.05)
IMM GRANULOCYTES NFR BLD AUTO: 0.2 % (ref 0–0.5)
KETONES UR QL STRIP: NEGATIVE
LEUKOCYTE ESTERASE UR QL STRIP.AUTO: NEGATIVE
LIPASE SERPL-CCNC: 31 U/L (ref 13–60)
LYMPHOCYTES # BLD AUTO: 1.41 10*3/MM3 (ref 0.7–3.1)
LYMPHOCYTES NFR BLD AUTO: 14.8 % (ref 19.6–45.3)
MCH RBC QN AUTO: 30.3 PG (ref 26.6–33)
MCHC RBC AUTO-ENTMCNC: 34.1 G/DL (ref 31.5–35.7)
MCV RBC AUTO: 88.9 FL (ref 79–97)
MONOCYTES # BLD AUTO: 0.61 10*3/MM3 (ref 0.1–0.9)
MONOCYTES NFR BLD AUTO: 6.4 % (ref 5–12)
NEUTROPHILS NFR BLD AUTO: 7.45 10*3/MM3 (ref 1.7–7)
NEUTROPHILS NFR BLD AUTO: 78.3 % (ref 42.7–76)
NITRITE UR QL STRIP: NEGATIVE
PH UR STRIP.AUTO: 8.5 [PH] (ref 5–8)
PLATELET # BLD AUTO: 191 10*3/MM3 (ref 140–450)
PMV BLD AUTO: 12 FL (ref 6–12)
POTASSIUM SERPL-SCNC: 4 MMOL/L (ref 3.5–5.2)
PROT SERPL-MCNC: 7.3 G/DL (ref 6–8.5)
PROT UR QL STRIP: ABNORMAL
RBC # BLD AUTO: 5.15 10*6/MM3 (ref 4.14–5.8)
SARS-COV-2 RNA RESP QL NAA+PROBE: NOT DETECTED
SODIUM SERPL-SCNC: 138 MMOL/L (ref 136–145)
SP GR UR STRIP: 1.01 (ref 1–1.03)
UROBILINOGEN UR QL STRIP: ABNORMAL
WBC NRBC COR # BLD AUTO: 9.52 10*3/MM3 (ref 3.4–10.8)

## 2024-01-01 PROCEDURE — 87636 SARSCOV2 & INF A&B AMP PRB: CPT | Performed by: STUDENT IN AN ORGANIZED HEALTH CARE EDUCATION/TRAINING PROGRAM

## 2024-01-01 PROCEDURE — 25010000002 MIDAZOLAM PER 1 MG: Performed by: STUDENT IN AN ORGANIZED HEALTH CARE EDUCATION/TRAINING PROGRAM

## 2024-01-01 PROCEDURE — 25810000003 SODIUM CHLORIDE 0.9 % SOLUTION: Performed by: STUDENT IN AN ORGANIZED HEALTH CARE EDUCATION/TRAINING PROGRAM

## 2024-01-01 PROCEDURE — 96361 HYDRATE IV INFUSION ADD-ON: CPT

## 2024-01-01 PROCEDURE — 81003 URINALYSIS AUTO W/O SCOPE: CPT | Performed by: STUDENT IN AN ORGANIZED HEALTH CARE EDUCATION/TRAINING PROGRAM

## 2024-01-01 PROCEDURE — 25010000002 ONDANSETRON PER 1 MG: Performed by: STUDENT IN AN ORGANIZED HEALTH CARE EDUCATION/TRAINING PROGRAM

## 2024-01-01 PROCEDURE — 96374 THER/PROPH/DIAG INJ IV PUSH: CPT

## 2024-01-01 PROCEDURE — 85025 COMPLETE CBC W/AUTO DIFF WBC: CPT | Performed by: STUDENT IN AN ORGANIZED HEALTH CARE EDUCATION/TRAINING PROGRAM

## 2024-01-01 PROCEDURE — 83690 ASSAY OF LIPASE: CPT | Performed by: STUDENT IN AN ORGANIZED HEALTH CARE EDUCATION/TRAINING PROGRAM

## 2024-01-01 PROCEDURE — 96375 TX/PRO/DX INJ NEW DRUG ADDON: CPT

## 2024-01-01 PROCEDURE — 25010000002 KETOROLAC TROMETHAMINE PER 15 MG: Performed by: STUDENT IN AN ORGANIZED HEALTH CARE EDUCATION/TRAINING PROGRAM

## 2024-01-01 PROCEDURE — 80053 COMPREHEN METABOLIC PANEL: CPT | Performed by: STUDENT IN AN ORGANIZED HEALTH CARE EDUCATION/TRAINING PROGRAM

## 2024-01-01 PROCEDURE — 99283 EMERGENCY DEPT VISIT LOW MDM: CPT

## 2024-01-01 RX ORDER — ONDANSETRON 2 MG/ML
4 INJECTION INTRAMUSCULAR; INTRAVENOUS ONCE
Status: COMPLETED | OUTPATIENT
Start: 2024-01-01 | End: 2024-01-01

## 2024-01-01 RX ORDER — FAMOTIDINE 10 MG/ML
10 INJECTION, SOLUTION INTRAVENOUS ONCE
Status: COMPLETED | OUTPATIENT
Start: 2024-01-01 | End: 2024-01-01

## 2024-01-01 RX ORDER — MIDAZOLAM HYDROCHLORIDE 1 MG/ML
1 INJECTION INTRAMUSCULAR; INTRAVENOUS ONCE
Status: COMPLETED | OUTPATIENT
Start: 2024-01-01 | End: 2024-01-01

## 2024-01-01 RX ORDER — METOCLOPRAMIDE 10 MG/1
10 TABLET ORAL 4 TIMES DAILY PRN
Qty: 30 TABLET | Refills: 0 | Status: SHIPPED | OUTPATIENT
Start: 2024-01-01

## 2024-01-01 RX ORDER — SODIUM CHLORIDE 0.9 % (FLUSH) 0.9 %
10 SYRINGE (ML) INJECTION AS NEEDED
Status: DISCONTINUED | OUTPATIENT
Start: 2024-01-01 | End: 2024-01-01 | Stop reason: HOSPADM

## 2024-01-01 RX ORDER — KETOROLAC TROMETHAMINE 15 MG/ML
15 INJECTION, SOLUTION INTRAMUSCULAR; INTRAVENOUS ONCE
Status: COMPLETED | OUTPATIENT
Start: 2024-01-01 | End: 2024-01-01

## 2024-01-01 RX ADMIN — ONDANSETRON HYDROCHLORIDE 4 MG: 2 INJECTION, SOLUTION INTRAMUSCULAR; INTRAVENOUS at 20:49

## 2024-01-01 RX ADMIN — FAMOTIDINE 10 MG: 10 INJECTION INTRAVENOUS at 20:49

## 2024-01-01 RX ADMIN — SODIUM CHLORIDE 1000 ML: 9 INJECTION, SOLUTION INTRAVENOUS at 20:48

## 2024-01-01 RX ADMIN — ALUMINUM HYDROXIDE, MAGNESIUM HYDROXIDE, AND DIMETHICONE: 400; 400; 40 SUSPENSION ORAL at 20:51

## 2024-01-01 RX ADMIN — KETOROLAC TROMETHAMINE 15 MG: 15 INJECTION, SOLUTION INTRAMUSCULAR; INTRAVENOUS at 20:48

## 2024-01-01 RX ADMIN — MIDAZOLAM 1 MG: 1 INJECTION INTRAMUSCULAR; INTRAVENOUS at 20:50

## 2024-01-02 NOTE — FSED PROVIDER NOTE
Subjective   History of Present Illness  24-year-old male with past medical history of anxiety, depression, gastritis presents to the emergency department with epigastric abdominal pain.  He reports symptoms been ongoing for the past week.  He reports he started having nausea and vomiting today which is what prompted his visit here to the emergency department.  He denies any fevers.  He has tried taking Zofran and Bentyl at home with minimal relief.  He does note that he has a GI physician, but has not seen them recently.  He does have an appointment coming up later this month.  He does note that he has been very anxious recently, and wonders if this may be contributing to his symptoms.    History provided by:  Patient      Review of Systems   Constitutional:  Negative for chills and fever.   HENT:  Negative for congestion and sore throat.    Eyes:  Negative for pain and redness.   Respiratory:  Negative for cough and shortness of breath.    Cardiovascular:  Negative for chest pain and palpitations.   Gastrointestinal:  Positive for abdominal pain, nausea and vomiting.   Genitourinary:  Negative for difficulty urinating and dysuria.   Musculoskeletal:  Negative for back pain and neck pain.   Skin:  Negative for rash and wound.   Neurological:  Negative for weakness, numbness and headaches.   Psychiatric/Behavioral:  The patient is nervous/anxious.    All other systems reviewed and are negative.      Past Medical History:   Diagnosis Date    Anxiety     Depression     Gastritis     GERD (gastroesophageal reflux disease)     IBS (irritable bowel syndrome)     Nausea and vomiting 07/08/2020    OCD (obsessive compulsive disorder)     Panic attack     Proctitis     PTSD (post-traumatic stress disorder)        Allergies   Allergen Reactions    Amoxicillin GI Intolerance    Penicillins GI Intolerance       Past Surgical History:   Procedure Laterality Date    COLONOSCOPY N/A 07/18/2020    Procedure: COLONOSCOPY to cecum  and t.i. with biopsies;  Surgeon: Tong Aly MD;  Location: [facility] ENDOSCOPY;  Service: Gastroenterology;  Laterality: N/A;  pre-  abd pain, n/v, diarrhea  post- normal    COLONOSCOPY N/A 8/14/2023    Procedure: COLONOSCOPY to cecum and ti;  Surgeon: Tong Aly MD;  Location: [facility] ENDOSCOPY;  Service: Gastroenterology;  Laterality: N/A;  PRE: abn imaging, constipation/diarrhea  POST: hemorrhoids    ENDOSCOPY N/A 07/18/2020    Procedure: ESOPHAGOGASTRODUODENOSCOPY with biopsies;  Surgeon: Tong Aly MD;  Location: [facility] ENDOSCOPY;  Service: Gastroenterology;  Laterality: N/A;  pre- abd pain, n/v, diarrhea  post- gastritis    ENDOSCOPY N/A 8/14/2023    Procedure: ESOPHAGOGASTRODUODENOSCOPY with cold bxs;  Surgeon: Tong Aly MD;  Location: [facility] ENDOSCOPY;  Service: Gastroenterology;  Laterality: N/A;  PRE: dyspepsia, abn imaging  POST: gastritis       Family History   Problem Relation Age of Onset    Anxiety disorder Mother     Mental illness Father         Brain Damage causing mental issues    Prostate cancer Neg Hx     Colon cancer Neg Hx     Breast cancer Neg Hx        Social History     Socioeconomic History    Marital status: Single   Tobacco Use    Smoking status: Never    Smokeless tobacco: Never   Vaping Use    Vaping Use: Never used   Substance and Sexual Activity    Alcohol use: No    Drug use: No    Sexual activity: Defer           Objective   Physical Exam  Vitals and nursing note reviewed.   Constitutional:       General: He is not in acute distress.     Appearance: Normal appearance.   HENT:      Head: Normocephalic and atraumatic.      Mouth/Throat:      Mouth: Mucous membranes are moist.   Eyes:      Extraocular Movements: Extraocular movements intact.      Conjunctiva/sclera: Conjunctivae normal.      Pupils: Pupils are equal, round, and reactive to light.   Cardiovascular:      Rate and Rhythm: Normal rate and regular rhythm.      Pulses: Normal pulses.      Heart sounds: Normal  heart sounds.   Pulmonary:      Effort: Pulmonary effort is normal. No respiratory distress.      Breath sounds: Normal breath sounds.   Abdominal:      General: There is no distension.      Palpations: Abdomen is soft.      Tenderness: There is no abdominal tenderness.      Comments: No tenderness to palpation   Musculoskeletal:         General: Normal range of motion.      Cervical back: Normal range of motion and neck supple.   Skin:     General: Skin is warm.   Neurological:      General: No focal deficit present.      Mental Status: He is alert.   Psychiatric:         Mood and Affect: Mood normal.         Behavior: Behavior normal.         Procedures           ED Course                                           Medical Decision Making  23-year-old male presents to the emergency department with abdominal pain.  Differential diagnosis includes gastritis, GERD, anxiety, pancreatitis, intra-abdominal infection, others.  Lab work is very reassuring.  Patient was given medications here in the emergency department and reports complete relief prior to discharge.  Discussed follow-up with his primary care physician and GI physician.  He is encouraged to return to the ED for any new or worsening symptoms.  Patient expressed understanding and agreement with this plan.  All questions were answered at bedside.  Patient discharged home.    Problems Addressed:  Gastritis without bleeding, unspecified chronicity, unspecified gastritis type: complicated acute illness or injury    Amount and/or Complexity of Data Reviewed  Labs: ordered.    Risk  Prescription drug management.        Final diagnoses:   Gastritis without bleeding, unspecified chronicity, unspecified gastritis type       ED Disposition  ED Disposition       ED Disposition   Discharge    Condition   Stable    Comment   --               Charito Avila III, NP-C  [address] [address]  [phone]    Schedule an appointment as  soon as possible for a visit in 3 days      [facility]  [id number] [phone]    As needed, If symptoms worsen         Medication List        New Prescriptions      metoclopramide 10 MG tablet  Commonly known as: REGLAN  Take 1 tablet by mouth 4 (Four) Times a Day As Needed (Nausea and vomiting).               Where to Get Your Medications        These medications were sent to [facility] [address] [place] [id number] [facility] [phone]  - [phone]   [address] [facility] [address]      Phone: [phone]   metoclopramide 10 MG tablet

## 2024-01-18 ENCOUNTER — OFFICE VISIT (OUTPATIENT)
Dept: GASTROENTEROLOGY | Facility: CLINIC | Age: 24
End: 2024-01-18
Payer: COMMERCIAL

## 2024-01-18 VITALS
HEART RATE: 79 BPM | OXYGEN SATURATION: 98 % | TEMPERATURE: 97.3 F | WEIGHT: 143 LBS | SYSTOLIC BLOOD PRESSURE: 125 MMHG | DIASTOLIC BLOOD PRESSURE: 66 MMHG | BODY MASS INDEX: 20.02 KG/M2 | HEIGHT: 71 IN

## 2024-01-18 DIAGNOSIS — R10.32 LEFT LOWER QUADRANT ABDOMINAL PAIN: ICD-10-CM

## 2024-01-18 DIAGNOSIS — K21.00 GASTROESOPHAGEAL REFLUX DISEASE WITH ESOPHAGITIS WITHOUT HEMORRHAGE: ICD-10-CM

## 2024-01-18 DIAGNOSIS — K59.00 CONSTIPATION, UNSPECIFIED CONSTIPATION TYPE: Primary | ICD-10-CM

## 2024-01-18 PROCEDURE — 1159F MED LIST DOCD IN RCRD: CPT | Performed by: INTERNAL MEDICINE

## 2024-01-18 PROCEDURE — 1160F RVW MEDS BY RX/DR IN RCRD: CPT | Performed by: INTERNAL MEDICINE

## 2024-01-18 PROCEDURE — 99213 OFFICE O/P EST LOW 20 MIN: CPT | Performed by: INTERNAL MEDICINE

## 2024-01-18 RX ORDER — DEXLANSOPRAZOLE 60 MG/1
1 CAPSULE, DELAYED RELEASE ORAL DAILY
COMMUNITY
Start: 2023-11-25

## 2024-01-18 NOTE — PROGRESS NOTES
"Chief Complaint   Patient presents with    Gastritis without bleeding    Nausea    Heartburn       History of Present Illness:   23 y.o. male who I last saw in 8 of 23 when I did an EGD and colonoscopy on him.  My assessment and plan then was as follows:  Assessment:  Abnormal CAT scan of the abdomen and pelvis showing proctitis and constipation.   Consitpation  GERD     Recommendations:  TAke fiber BID  Colonosncopy + EGD       Path from the EGD was unremarkable. Path from the colon biopsies were normal except the cecal biopsies showed \"lymphocytic cryptitis\" and the pathologist thought that this could be from resolving infection or early microscopic colitis?       He went to the [facility] emergency room on 1/1/2024 with epigastric pain.  He was started on Reglan.       He is better. He thinks the Dexilant 60 mg/day is working well. He has not taken the reglan. He has some lower abdominal pain and it is worse prior to BM. It is better after BM. No diarrhea or consitaption. On a probiotic. NO dysuria or urinary freq. No rectal bleeding or melena. No fevers, chills, nite sweats. Weight stable. Denies NSAIDs use. NOnsmoker. No ETOH. He averages 2 BM/day. Sometimes hard stool.        He doesn't take bentyl because it makes him anxious when he takes it.     Past Medical History:   Diagnosis Date    Anxiety     Depression     Gastritis     GERD (gastroesophageal reflux disease)     IBS (irritable bowel syndrome)     Nausea and vomiting 07/08/2020    OCD (obsessive compulsive disorder)     Panic attack     Proctitis     PTSD (post-traumatic stress disorder)        Past Surgical History:   Procedure Laterality Date    COLONOSCOPY N/A 07/18/2020    Procedure: COLONOSCOPY to cecum and t.i. with biopsies;  Surgeon: Tong Aly MD;  Location: [facility] ENDOSCOPY;  Service: Gastroenterology;  Laterality: N/A;  pre-  abd pain, n/v, diarrhea  post- normal    COLONOSCOPY N/A 8/14/2023    Procedure: COLONOSCOPY to cecum and ti;  " Surgeon: Tong Aly MD;  Location: [facility] ENDOSCOPY;  Service: Gastroenterology;  Laterality: N/A;  PRE: abn imaging, constipation/diarrhea  POST: hemorrhoids    ENDOSCOPY N/A 07/18/2020    Procedure: ESOPHAGOGASTRODUODENOSCOPY with biopsies;  Surgeon: Tong Aly MD;  Location: [facility] ENDOSCOPY;  Service: Gastroenterology;  Laterality: N/A;  pre- abd pain, n/v, diarrhea  post- gastritis    ENDOSCOPY N/A 8/14/2023    Procedure: ESOPHAGOGASTRODUODENOSCOPY with cold bxs;  Surgeon: Tong Aly MD;  Location: [facility] ENDOSCOPY;  Service: Gastroenterology;  Laterality: N/A;  PRE: dyspepsia, abn imaging  POST: gastritis         Current Outpatient Medications:     buPROPion XL (WELLBUTRIN XL) 150 MG 24 hr tablet, Take 1 tablet by mouth Daily., Disp: , Rfl:     dexlansoprazole (DEXILANT) 60 MG capsule, Take 1 capsule by mouth Daily., Disp: , Rfl:     hydrOXYzine pamoate (VISTARIL) 25 MG capsule, Take 1 capsule by mouth Daily As Needed., Disp: , Rfl:     Multiple Vitamin (MULTIVITAMIN PO), Take  by mouth., Disp: , Rfl:     ondansetron ODT (ZOFRAN-ODT) 4 MG disintegrating tablet, Place 1 tablet on the tongue Every 8 (Eight) Hours As Needed for Nausea or Vomiting., Disp: 12 tablet, Rfl: 0    dicyclomine (BENTYL) 10 MG capsule, Take 1 capsule by mouth 4 (Four) Times a Day Before Meals & at Bedtime. (Patient not taking: Reported on 1/18/2024), Disp: , Rfl:     Allergies   Allergen Reactions    Amoxicillin GI Intolerance    Penicillins GI Intolerance       Family History   Problem Relation Age of Onset    Anxiety disorder Mother     Mental illness Father         Brain Damage causing mental issues    Prostate cancer Neg Hx     Colon cancer Neg Hx     Breast cancer Neg Hx        Social History     Socioeconomic History    Marital status: Single   Tobacco Use    Smoking status: Never    Smokeless tobacco: Never   Vaping Use    Vaping Use: Never used   Substance and Sexual Activity    Alcohol use: No    Drug use: No    Sexual  activity: Defer       Review of Systems   Gastrointestinal:  Positive for abdominal pain and constipation.   All other systems reviewed and are negative.    Pertinent positives and negatives documented in the HPI and all other systems reviewed and were found to be negative.  Vitals:    01/18/24 0922   BP: 125/66   Pulse: 79   Temp: 97.3 °F (36.3 °C)   SpO2: 98%       Physical Exam  Vitals reviewed.   Constitutional:       General: He is not in acute distress.     Appearance: Normal appearance. He is well-developed. He is not diaphoretic.   HENT:      Head: Normocephalic and atraumatic. Hair is normal.      Right Ear: Hearing, tympanic membrane, ear canal and external ear normal.      Left Ear: Hearing, tympanic membrane, ear canal and external ear normal.      Nose: Nose normal. No nasal deformity.      Mouth/Throat:      Mouth: Mucous membranes are moist. No oral lesions.      Pharynx: Uvula midline. No uvula swelling.   Eyes:      General: Lids are normal. No scleral icterus.        Right eye: No discharge.         Left eye: No discharge.      Extraocular Movements: Extraocular movements intact.      Right eye: Normal extraocular motion and no nystagmus.      Left eye: Normal extraocular motion and no nystagmus.      Conjunctiva/sclera: Conjunctivae normal.      Pupils: Pupils are equal, round, and reactive to light.   Neck:      Thyroid: No thyromegaly.      Vascular: No JVD.   Cardiovascular:      Rate and Rhythm: Normal rate and regular rhythm.      Pulses: Normal pulses.      Heart sounds: Normal heart sounds. No murmur heard.     No gallop.   Pulmonary:      Effort: Pulmonary effort is normal. No respiratory distress.      Breath sounds: Normal breath sounds. No wheezing or rales.   Chest:      Chest wall: No tenderness.   Abdominal:      General: Bowel sounds are normal. There is no distension.      Palpations: Abdomen is soft. There is no mass.      Tenderness: There is no abdominal tenderness. There is no  guarding.      Hernia: No hernia is present.   Musculoskeletal:         General: No tenderness or deformity. Normal range of motion.      Cervical back: Normal range of motion and neck supple.   Lymphadenopathy:      Cervical: No cervical adenopathy.   Skin:     General: Skin is warm and dry.      Findings: No rash.   Neurological:      Mental Status: He is alert and oriented to person, place, and time.      Cranial Nerves: No cranial nerve deficit.      Motor: No abnormal muscle tone.      Coordination: Coordination normal.      Deep Tendon Reflexes: Reflexes are normal and symmetric. Reflexes normal.   Psychiatric:         Mood and Affect: Mood normal.         Behavior: Behavior normal.         Thought Content: Thought content normal.         Judgment: Judgment normal.         Diagnoses and all orders for this visit:    1. Constipation, unspecified constipation type (Primary)    2. Left lower quadrant abdominal pain    3. Gastroesophageal reflux disease with esophagitis without hemorrhage      Assessment:  Abdominal pain prior to BM  GERD      Recommendations:  Try taking fibercon pills 2/day.  F/u 3 mos.   Continue Dexilant 60 mg/day      Return in about 3 months (around 4/18/2024).    Tong Aly MD  1/18/2024

## 2024-03-06 ENCOUNTER — HOSPITAL ENCOUNTER (OUTPATIENT)
Facility: HOSPITAL | Age: 24
Discharge: HOME | End: 2024-03-06
Attending: EMERGENCY MEDICINE
Payer: COMMERCIAL

## 2024-03-06 VITALS
SYSTOLIC BLOOD PRESSURE: 131 MMHG | WEIGHT: 140 LBS | HEART RATE: 84 BPM | TEMPERATURE: 98.1 F | DIASTOLIC BLOOD PRESSURE: 71 MMHG | BODY MASS INDEX: 19.6 KG/M2 | OXYGEN SATURATION: 100 % | RESPIRATION RATE: 16 BRPM | HEIGHT: 71 IN

## 2024-03-06 DIAGNOSIS — B34.9 VIRAL SYNDROME: Primary | ICD-10-CM

## 2024-03-06 LAB
FLUAV SUBTYP SPEC NAA+PROBE: NOT DETECTED
FLUBV RNA ISLT QL NAA+PROBE: NOT DETECTED
RSV RNA NPH QL NAA+NON-PROBE: NOT DETECTED
SARS-COV-2 RNA RESP QL NAA+PROBE: NOT DETECTED
STREP A PCR: NOT DETECTED

## 2024-03-06 PROCEDURE — G0463 HOSPITAL OUTPT CLINIC VISIT: HCPCS | Performed by: EMERGENCY MEDICINE

## 2024-03-06 PROCEDURE — 87637 SARSCOV2&INF A&B&RSV AMP PRB: CPT | Performed by: EMERGENCY MEDICINE

## 2024-03-06 PROCEDURE — 99213 OFFICE O/P EST LOW 20 MIN: CPT | Performed by: EMERGENCY MEDICINE

## 2024-03-06 PROCEDURE — 87651 STREP A DNA AMP PROBE: CPT | Performed by: EMERGENCY MEDICINE

## 2024-03-06 RX ORDER — ACETAMINOPHEN 325 MG/1
650 TABLET ORAL ONCE
Status: COMPLETED | OUTPATIENT
Start: 2024-03-06 | End: 2024-03-06

## 2024-03-06 RX ADMIN — ACETAMINOPHEN 650 MG: 325 TABLET, FILM COATED ORAL at 08:20

## 2024-03-06 NOTE — FSED PROVIDER NOTE
Subjective   History of Present Illness  23 old male with 3-day history of malaise, body aches, mild frontal headache, sinus symptoms, dry cough and occasional diarrhea which is watery.  He does have a history of IBS and is not sure that is related to the diarrhea.  He is at risk for COVID and flu RSV.  He has no stiff or sore neck or rash, no chest pain or back pain, some very mild abdominal pain with the diarrhea.       Review of Systems    Past Medical History:   Diagnosis Date    Anxiety     Depression     Gastritis     GERD (gastroesophageal reflux disease)     IBS (irritable bowel syndrome)     Nausea and vomiting 07/08/2020    OCD (obsessive compulsive disorder)     Panic attack     Proctitis     PTSD (post-traumatic stress disorder)        Allergies   Allergen Reactions    Amoxicillin GI Intolerance    Penicillins GI Intolerance       Past Surgical History:   Procedure Laterality Date    COLONOSCOPY N/A 07/18/2020    Procedure: COLONOSCOPY to cecum and t.i. with biopsies;  Surgeon: Tong Aly MD;  Location: [facility] ENDOSCOPY;  Service: Gastroenterology;  Laterality: N/A;  pre-  abd pain, n/v, diarrhea  post- normal    COLONOSCOPY N/A 8/14/2023    Procedure: COLONOSCOPY to cecum and ti;  Surgeon: Tong Aly MD;  Location: [facility] ENDOSCOPY;  Service: Gastroenterology;  Laterality: N/A;  PRE: abn imaging, constipation/diarrhea  POST: hemorrhoids    ENDOSCOPY N/A 07/18/2020    Procedure: ESOPHAGOGASTRODUODENOSCOPY with biopsies;  Surgeon: Tong Aly MD;  Location: [facility] ENDOSCOPY;  Service: Gastroenterology;  Laterality: N/A;  pre- abd pain, n/v, diarrhea  post- gastritis    ENDOSCOPY N/A 8/14/2023    Procedure: ESOPHAGOGASTRODUODENOSCOPY with cold bxs;  Surgeon: Tong Aly MD;  Location: [facility] ENDOSCOPY;  Service: Gastroenterology;  Laterality: N/A;  PRE: dyspepsia, abn imaging  POST: gastritis       Family History   Problem Relation Age of Onset    Anxiety disorder Mother     Mental illness Father          Brain Damage causing mental issues    Prostate cancer Neg Hx     Colon cancer Neg Hx     Breast cancer Neg Hx        Social History     Socioeconomic History    Marital status: Single   Tobacco Use    Smoking status: Never    Smokeless tobacco: Never   Vaping Use    Vaping status: Never Used   Substance and Sexual Activity    Alcohol use: No    Drug use: No    Sexual activity: Defer           Objective   Physical Exam  Vitals and nursing note reviewed.   Constitutional:       Appearance: Normal appearance. He is normal weight.   HENT:      Mouth/Throat:      Mouth: Mucous membranes are dry.      Pharynx: Oropharynx is clear.   Eyes:      Extraocular Movements: Extraocular movements intact.      Conjunctiva/sclera: Conjunctivae normal.   Cardiovascular:      Rate and Rhythm: Normal rate and regular rhythm.      Pulses: Normal pulses.      Heart sounds: Normal heart sounds. No murmur heard.  Pulmonary:      Effort: Pulmonary effort is normal. No respiratory distress.      Breath sounds: Normal breath sounds. No stridor. No wheezing, rhonchi or rales.   Abdominal:      General: Abdomen is flat.   Musculoskeletal:         General: No swelling. Normal range of motion.      Cervical back: Normal range of motion and neck supple.   Skin:     General: Skin is warm and dry.      Capillary Refill: Capillary refill takes less than 2 seconds.      Coloration: Skin is not pale.      Findings: No erythema or rash.   Neurological:      General: No focal deficit present.      Mental Status: He is alert and oriented to person, place, and time. Mental status is at baseline.   Psychiatric:         Mood and Affect: Mood normal.         Behavior: Behavior normal.         Thought Content: Thought content normal.         Judgment: Judgment normal.         Procedures           ED Course  ED Course as of 03/06/24 0847   Wed Mar 06, 2024   0843 RSV COVID flu strep negative [AR]      ED Course User Index  [AR] Colleen Lay MD                                            Medical Decision Making  Differential diagnosis includes but not limited to COVID flu strep RSV viral syndrome oral infection will get COVID flu strep RSV swabs and give Tylenol for aches.    Today your COVID, flu, strep and RSV tests swabs are negative.  Over the next few days you might need to get retested if you feel worse because sometimes early on these are not showing up positive for the particular infection.  Treatment is for any of this is over-the-counter Tylenol or ibuprofen, cough syrup, Benadryl and other medications for your symptoms.  The biggest key to an illness like this which is likely a virus of some kind is to stay well-hydrated.  Be sure and get in 3 to 4 liters of fluid a day.  Follow-up with your primary care doctor as needed or you can return here if symptoms get worse.  Symptoms such as stiff or sore neck, rash, a severe headache or you cannot keep fluids down.    He understood and agreed.    Amount and/or Complexity of Data Reviewed  Labs: ordered. Decision-making details documented in ED Course.        Final diagnoses:   Viral syndrome       ED Disposition  ED Disposition       ED Disposition   Discharge    Condition   Stable    Comment   --               Charito Avila III, NP-C  [address] [id number] [phone]    In 1 week  As needed         Medication List      No changes were made to your prescriptions during this visit.

## 2024-03-06 NOTE — DISCHARGE INSTRUCTIONS
Today your COVID, flu, strep and RSV tests swabs are negative.  Over the next few days you might need to get retested if you feel worse because sometimes early on these are not showing up positive for the particular infection.  Treatment is for any of this is over-the-counter Tylenol or ibuprofen, cough syrup, Benadryl and other medications for your symptoms.  The biggest key to an illness like this which is likely a virus of some kind is to stay well-hydrated.  Be sure and get in 3 to 4 liters of fluid a day.  Follow-up with your primary care doctor as needed or you can return here if symptoms get worse.  Symptoms such as stiff or sore neck, rash, a severe headache or you cannot keep fluids down.

## 2024-04-18 ENCOUNTER — OFFICE VISIT (OUTPATIENT)
Dept: GASTROENTEROLOGY | Facility: CLINIC | Age: 24
End: 2024-04-18
Payer: COMMERCIAL

## 2024-04-18 VITALS
HEART RATE: 71 BPM | WEIGHT: 153.6 LBS | BODY MASS INDEX: 21.5 KG/M2 | HEIGHT: 71 IN | TEMPERATURE: 98.4 F | DIASTOLIC BLOOD PRESSURE: 72 MMHG | SYSTOLIC BLOOD PRESSURE: 119 MMHG

## 2024-04-18 DIAGNOSIS — K21.00 GASTROESOPHAGEAL REFLUX DISEASE WITH ESOPHAGITIS WITHOUT HEMORRHAGE: Primary | ICD-10-CM

## 2024-04-18 DIAGNOSIS — R10.30 LOWER ABDOMINAL PAIN: ICD-10-CM

## 2024-04-18 DIAGNOSIS — R19.8 ALTERNATING CONSTIPATION AND DIARRHEA: ICD-10-CM

## 2024-04-18 PROCEDURE — 1160F RVW MEDS BY RX/DR IN RCRD: CPT | Performed by: INTERNAL MEDICINE

## 2024-04-18 PROCEDURE — 1159F MED LIST DOCD IN RCRD: CPT | Performed by: INTERNAL MEDICINE

## 2024-04-18 PROCEDURE — 99213 OFFICE O/P EST LOW 20 MIN: CPT | Performed by: INTERNAL MEDICINE

## 2024-04-18 NOTE — PROGRESS NOTES
"Chief Complaint   Patient presents with    Constipation    Abdominal Pain       History of Present Illness:   24 y.o. male who I last saw in 1/24:  Assessment:  Abdominal pain prior to BM  GERD     Recommendations:  Try taking fibercon pills 2/day.  F/u 3 mos.   Continue Dexilant 60 mg/day       He last had an EGD and colonoscopy (colon biopsies were normal except cecal biopsies showed \"Benign colonic mucosa with surface alteration and lymphocytic cryptitis. \" The Pathologist thought that this could be from a resolving infection or early microscopic colitis?) in 8/23.        He last had a CT abd/pelvis in 8/23:  IMPRESSION:  1. There is likely persistent or recurrent mild-moderate proctitis.  There is no free fluid or fluid collection.  2. Thickened urinary bladder wall. Please correlate clinically for acute  UTI/cystitis.     This report was finalized on 8/4/2023 4:40 PM by Dr. Gayla Drew M.D.        He is better than he was in 1/24. His constipation is not as bad. Diarrhea not as bad but occasional. Some lower abd discomfort (cramping) that is worse when in process of having a BM. He averages 3 BM/day. No rectal bleeding or melena. No nausea or vomiting. NO fevers, chills. He has gained weight. He isn't taking the bentyl because it makes his anxiety worse if he takes bentyl. Metamucil made him constipated.        The Dexilant helps the GERD in the AM.    Past Medical History:   Diagnosis Date    Anxiety     Depression     Gastritis     GERD (gastroesophageal reflux disease)     IBS (irritable bowel syndrome)     Nausea and vomiting 07/08/2020    OCD (obsessive compulsive disorder)     Panic attack     Proctitis     PTSD (post-traumatic stress disorder)        Past Surgical History:   Procedure Laterality Date    COLONOSCOPY N/A 07/18/2020    Procedure: COLONOSCOPY to cecum and t.i. with biopsies;  Surgeon: Tong Aly MD;  Location: [facility] ENDOSCOPY;  Service: Gastroenterology;  Laterality: N/A;  pre-  abd " pain, n/v, diarrhea  post- normal    COLONOSCOPY N/A 8/14/2023    Procedure: COLONOSCOPY to cecum and ti;  Surgeon: Tong Aly MD;  Location: [facility] ENDOSCOPY;  Service: Gastroenterology;  Laterality: N/A;  PRE: abn imaging, constipation/diarrhea  POST: hemorrhoids    ENDOSCOPY N/A 07/18/2020    Procedure: ESOPHAGOGASTRODUODENOSCOPY with biopsies;  Surgeon: Tong Aly MD;  Location: [facility] ENDOSCOPY;  Service: Gastroenterology;  Laterality: N/A;  pre- abd pain, n/v, diarrhea  post- gastritis    ENDOSCOPY N/A 8/14/2023    Procedure: ESOPHAGOGASTRODUODENOSCOPY with cold bxs;  Surgeon: Tong Aly MD;  Location: [facility] ENDOSCOPY;  Service: Gastroenterology;  Laterality: N/A;  PRE: dyspepsia, abn imaging  POST: gastritis         Current Outpatient Medications:     buPROPion XL (WELLBUTRIN XL) 150 MG 24 hr tablet, Take 1 tablet by mouth Daily., Disp: , Rfl:     dexlansoprazole (DEXILANT) 60 MG capsule, Take 1 capsule by mouth Daily., Disp: , Rfl:     hydrOXYzine pamoate (VISTARIL) 25 MG capsule, Take 1 capsule by mouth Daily As Needed., Disp: , Rfl:     Multiple Vitamin (MULTIVITAMIN PO), Take  by mouth., Disp: , Rfl:     dicyclomine (BENTYL) 10 MG capsule, Take 1 capsule by mouth 4 (Four) Times a Day Before Meals & at Bedtime. (Patient not taking: Reported on 1/18/2024), Disp: , Rfl:     ondansetron ODT (ZOFRAN-ODT) 4 MG disintegrating tablet, Place 1 tablet on the tongue Every 8 (Eight) Hours As Needed for Nausea or Vomiting. (Patient not taking: Reported on 4/18/2024), Disp: 12 tablet, Rfl: 0    Allergies   Allergen Reactions    Amoxicillin GI Intolerance    Penicillins GI Intolerance       Family History   Problem Relation Age of Onset    Anxiety disorder Mother     Mental illness Father         Brain Damage causing mental issues    Prostate cancer Neg Hx     Colon cancer Neg Hx     Breast cancer Neg Hx        Social History     Socioeconomic History    Marital status: Single   Tobacco Use    Smoking status:  Never    Smokeless tobacco: Never   Vaping Use    Vaping status: Never Used   Substance and Sexual Activity    Alcohol use: No    Drug use: No    Sexual activity: Defer       Review of Systems   Gastrointestinal:  Positive for abdominal pain, constipation and diarrhea.   All other systems reviewed and are negative.    Pertinent positives and negatives documented in the HPI and all other systems reviewed and were found to be negative.  Vitals:    04/18/24 0809   BP: 119/72   Pulse: 71   Temp: 98.4 °F (36.9 °C)       Physical Exam  Vitals reviewed.   Constitutional:       General: He is not in acute distress.     Appearance: Normal appearance. He is well-developed. He is not diaphoretic.   HENT:      Head: Normocephalic and atraumatic. Hair is normal.      Right Ear: Hearing, tympanic membrane, ear canal and external ear normal.      Left Ear: Hearing, tympanic membrane, ear canal and external ear normal.      Nose: Nose normal. No nasal deformity.      Mouth/Throat:      Mouth: Mucous membranes are moist. No oral lesions.      Pharynx: Uvula midline. No uvula swelling.   Eyes:      General: Lids are normal. No scleral icterus.        Right eye: No discharge.         Left eye: No discharge.      Extraocular Movements: Extraocular movements intact.      Right eye: Normal extraocular motion and no nystagmus.      Left eye: Normal extraocular motion and no nystagmus.      Conjunctiva/sclera: Conjunctivae normal.      Pupils: Pupils are equal, round, and reactive to light.   Neck:      Thyroid: No thyromegaly.      Vascular: No JVD.   Cardiovascular:      Rate and Rhythm: Normal rate and regular rhythm.      Pulses: Normal pulses.      Heart sounds: Normal heart sounds. No murmur heard.     No gallop.   Pulmonary:      Effort: Pulmonary effort is normal. No respiratory distress.      Breath sounds: Normal breath sounds. No wheezing or rales.   Chest:      Chest wall: No tenderness.   Abdominal:      General: Bowel sounds  are normal. There is no distension.      Palpations: Abdomen is soft. There is no mass.      Tenderness: There is no abdominal tenderness. There is no guarding.      Hernia: No hernia is present.   Musculoskeletal:         General: No tenderness or deformity. Normal range of motion.      Cervical back: Normal range of motion and neck supple.   Lymphadenopathy:      Cervical: No cervical adenopathy.   Skin:     General: Skin is warm and dry.      Findings: No rash.   Neurological:      Mental Status: He is alert and oriented to person, place, and time.      Cranial Nerves: No cranial nerve deficit.      Motor: No abnormal muscle tone.      Coordination: Coordination normal.      Deep Tendon Reflexes: Reflexes are normal and symmetric. Reflexes normal.   Psychiatric:         Mood and Affect: Mood normal.         Behavior: Behavior normal.         Thought Content: Thought content normal.         Judgment: Judgment normal.         Diagnoses and all orders for this visit:    1. Gastroesophageal reflux disease with esophagitis without hemorrhage (Primary)    2. Alternating constipation and diarrhea    3. Lower abdominal pain      Assessment:  Abdominal pain prior to BM  GERD  Alternating diarrhea and constipation.      Recommendations:  Take fibercon 2/day.  He doesn't want to try a new antispasm med for now.   F/u 3mos.   Continue Dexilant 60 mg/day      Return in about 3 months (around 7/18/2024).    Tong Aly MD  4/18/2024

## 2024-07-18 ENCOUNTER — OFFICE VISIT (OUTPATIENT)
Dept: GASTROENTEROLOGY | Facility: CLINIC | Age: 24
End: 2024-07-18
Payer: COMMERCIAL

## 2024-07-18 VITALS
DIASTOLIC BLOOD PRESSURE: 71 MMHG | BODY MASS INDEX: 28.81 KG/M2 | HEIGHT: 61 IN | TEMPERATURE: 97.5 F | HEART RATE: 54 BPM | SYSTOLIC BLOOD PRESSURE: 115 MMHG | WEIGHT: 152.6 LBS

## 2024-07-18 DIAGNOSIS — K21.9 GASTROESOPHAGEAL REFLUX DISEASE, UNSPECIFIED WHETHER ESOPHAGITIS PRESENT: ICD-10-CM

## 2024-07-18 DIAGNOSIS — K59.00 CONSTIPATION, UNSPECIFIED CONSTIPATION TYPE: Primary | ICD-10-CM

## 2024-07-18 DIAGNOSIS — K58.1 IRRITABLE BOWEL SYNDROME WITH CONSTIPATION: ICD-10-CM

## 2024-07-18 PROCEDURE — 99213 OFFICE O/P EST LOW 20 MIN: CPT | Performed by: INTERNAL MEDICINE

## 2024-07-18 PROCEDURE — 1159F MED LIST DOCD IN RCRD: CPT | Performed by: INTERNAL MEDICINE

## 2024-07-18 PROCEDURE — 1160F RVW MEDS BY RX/DR IN RCRD: CPT | Performed by: INTERNAL MEDICINE

## 2024-07-18 NOTE — PROGRESS NOTES
"Chief Complaint   Patient presents with    Constipation    Abdominal Pain    Nausea       History of Present Illness:   24 y.o. male who I last saw in 4/24:  Assessment:  Abdominal pain prior to BM  GERD  Alternating diarrhea and constipation.        Recommendations:  Take fibercon 2/day.  He doesn't want to try a new antispasm med for now.   F/u 3mos.   Continue Dexilant 60 mg/day          He last had an EGD and colonoscopy (colon biopsies were normal except cecal biopsies showed \"Benign colonic mucosa with surface alteration and lymphocytic cryptitis. \" The Pathologist thought that this could be from a resolving infection or early microscopic colitis?) in 8/23.        Other GI testing:  - 8/23: CT abd/pelvis:  IMPRESSION:  1. There is likely persistent or recurrent mild-moderate proctitis.  There is no free fluid or fluid collection.  2. Thickened urinary bladder wall. Please correlate clinically for acute  UTI/cystitis.       His GERD is behaving itself.        On fibercon pills 1 BID but is getting more constipated. He has occas periumbilical pain with radiation down to the suprapubic area that is worse when trying to move his bowels. Sometimes hard  stool. No rectal bleeding or melena. Some nausea occas when trying to move bowels. No fevers, chills, nite sweats. Weight stable.     Past Medical History:   Diagnosis Date    Anxiety     Depression     Gastritis     GERD (gastroesophageal reflux disease)     IBS (irritable bowel syndrome)     Nausea and vomiting 07/08/2020    OCD (obsessive compulsive disorder)     Panic attack     Proctitis     PTSD (post-traumatic stress disorder)        Past Surgical History:   Procedure Laterality Date    COLONOSCOPY N/A 07/18/2020    Procedure: COLONOSCOPY to cecum and t.i. with biopsies;  Surgeon: Tong Aly MD;  Location: [facility] ENDOSCOPY;  Service: Gastroenterology;  Laterality: N/A;  pre-  abd pain, n/v, diarrhea  post- normal    COLONOSCOPY N/A 8/14/2023    Procedure: " COLONOSCOPY to cecum and ti;  Surgeon: Tong Aly MD;  Location:  [facility] ENDOSCOPY;  Service: Gastroenterology;  Laterality: N/A;  PRE: abn imaging, constipation/diarrhea  POST: hemorrhoids    ENDOSCOPY N/A 07/18/2020    Procedure: ESOPHAGOGASTRODUODENOSCOPY with biopsies;  Surgeon: Tong Aly MD;  Location:  [facility] ENDOSCOPY;  Service: Gastroenterology;  Laterality: N/A;  pre- abd pain, n/v, diarrhea  post- gastritis    ENDOSCOPY N/A 8/14/2023    Procedure: ESOPHAGOGASTRODUODENOSCOPY with cold bxs;  Surgeon: Tong Aly MD;  Location: [facility] ENDOSCOPY;  Service: Gastroenterology;  Laterality: N/A;  PRE: dyspepsia, abn imaging  POST: gastritis         Current Outpatient Medications:     buPROPion XL (WELLBUTRIN XL) 150 MG 24 hr tablet, Take 1 tablet by mouth Daily., Disp: , Rfl:     dexlansoprazole (DEXILANT) 60 MG capsule, Take 1 capsule by mouth Daily., Disp: , Rfl:     dicyclomine (BENTYL) 10 MG capsule, Take 1 capsule by mouth 4 (Four) Times a Day Before Meals & at Bedtime., Disp: , Rfl:     hydrOXYzine pamoate (VISTARIL) 25 MG capsule, Take 1 capsule by mouth Daily As Needed., Disp: , Rfl:     Multiple Vitamin (MULTIVITAMIN PO), Take  by mouth., Disp: , Rfl:     ondansetron ODT (ZOFRAN-ODT) 4 MG disintegrating tablet, Place 1 tablet on the tongue Every 8 (Eight) Hours As Needed for Nausea or Vomiting., Disp: 12 tablet, Rfl: 0    Allergies   Allergen Reactions    Amoxicillin GI Intolerance    Penicillins GI Intolerance       Family History   Problem Relation Age of Onset    Anxiety disorder Mother     Mental illness Father         Brain Damage causing mental issues    Prostate cancer Neg Hx     Colon cancer Neg Hx     Breast cancer Neg Hx        Social History     Socioeconomic History    Marital status: Single   Tobacco Use    Smoking status: Never    Smokeless tobacco: Never   Vaping Use    Vaping status: Never Used   Substance and Sexual Activity    Alcohol use: No    Drug use: No    Sexual activity:  Defer       Review of Systems   Gastrointestinal:  Positive for abdominal pain and constipation.   All other systems reviewed and are negative.    Pertinent positives and negatives documented in the HPI and all other systems reviewed and were found to be negative.  Vitals:    07/18/24 0820   BP: 115/71   Pulse: 54   Temp: 97.5 °F (36.4 °C)       Physical Exam  Vitals reviewed.   Constitutional:       General: He is not in acute distress.     Appearance: Normal appearance. He is well-developed. He is not diaphoretic.   HENT:      Head: Normocephalic and atraumatic. Hair is normal.      Right Ear: Hearing, tympanic membrane, ear canal and external ear normal.      Left Ear: Hearing, tympanic membrane, ear canal and external ear normal.      Nose: Nose normal. No nasal deformity.      Mouth/Throat:      Mouth: Mucous membranes are moist. No oral lesions.      Pharynx: Uvula midline. No uvula swelling.   Eyes:      General: Lids are normal. No scleral icterus.        Right eye: No discharge.         Left eye: No discharge.      Extraocular Movements: Extraocular movements intact.      Right eye: Normal extraocular motion and no nystagmus.      Left eye: Normal extraocular motion and no nystagmus.      Conjunctiva/sclera: Conjunctivae normal.      Pupils: Pupils are equal, round, and reactive to light.   Neck:      Thyroid: No thyromegaly.      Vascular: No JVD.   Cardiovascular:      Rate and Rhythm: Normal rate and regular rhythm.      Pulses: Normal pulses.      Heart sounds: Normal heart sounds. No murmur heard.     No gallop.   Pulmonary:      Effort: Pulmonary effort is normal. No respiratory distress.      Breath sounds: Normal breath sounds. No wheezing or rales.   Chest:      Chest wall: No tenderness.   Abdominal:      General: Bowel sounds are normal. There is no distension.      Palpations: Abdomen is soft. There is no mass.      Tenderness: There is no abdominal tenderness. There is no guarding.      Hernia:  No hernia is present.   Genitourinary:     Rectum: Normal. Guaiac result negative.   Musculoskeletal:         General: No tenderness or deformity. Normal range of motion.      Cervical back: Normal range of motion and neck supple.   Lymphadenopathy:      Cervical: No cervical adenopathy.   Skin:     General: Skin is warm and dry.      Findings: No rash.   Neurological:      Mental Status: He is alert and oriented to person, place, and time.      Cranial Nerves: No cranial nerve deficit.      Motor: No abnormal muscle tone.      Coordination: Coordination normal.      Deep Tendon Reflexes: Reflexes are normal and symmetric. Reflexes normal.   Psychiatric:         Mood and Affect: Mood normal.         Behavior: Behavior normal.         Thought Content: Thought content normal.         Judgment: Judgment normal.         Diagnoses and all orders for this visit:    1. Constipation, unspecified constipation type (Primary)    2. Irritable bowel syndrome with constipation    3. Gastroesophageal reflux disease, unspecified whether esophagitis present      Assessment:  Constipation  Abdominal discomfort associated with constipation  GERD - on Dexilant which works well      Recommendations:  Go see your Urologist about the thickened urinary bladder wall seen on the CT abd/pelvis 8/23.   Increase the fiber to Fibercon 2 BID and drink more water.  F/u 6 mos.     Return in about 6 months (around 1/18/2025).    Tong Aly MD  7/18/2024

## 2024-07-24 ENCOUNTER — OFFICE VISIT (OUTPATIENT)
Dept: INTERNAL MEDICINE | Facility: CLINIC | Age: 24
End: 2024-07-24
Payer: COMMERCIAL

## 2024-07-24 VITALS
BODY MASS INDEX: 28.7 KG/M2 | OXYGEN SATURATION: 99 % | DIASTOLIC BLOOD PRESSURE: 80 MMHG | HEART RATE: 64 BPM | SYSTOLIC BLOOD PRESSURE: 122 MMHG | WEIGHT: 152 LBS | HEIGHT: 61 IN

## 2024-07-24 DIAGNOSIS — K58.0 IRRITABLE BOWEL SYNDROME WITH DIARRHEA: ICD-10-CM

## 2024-07-24 DIAGNOSIS — Z00.00 ANNUAL PHYSICAL EXAM: Primary | ICD-10-CM

## 2024-07-24 DIAGNOSIS — K21.9 GASTROESOPHAGEAL REFLUX DISEASE, UNSPECIFIED WHETHER ESOPHAGITIS PRESENT: Chronic | ICD-10-CM

## 2024-07-24 DIAGNOSIS — F41.8 OTHER SPECIFIED ANXIETY DISORDERS: Chronic | ICD-10-CM

## 2024-07-24 PROCEDURE — 1159F MED LIST DOCD IN RCRD: CPT | Performed by: NURSE PRACTITIONER

## 2024-07-24 PROCEDURE — 1160F RVW MEDS BY RX/DR IN RCRD: CPT | Performed by: NURSE PRACTITIONER

## 2024-07-24 PROCEDURE — 99395 PREV VISIT EST AGE 18-39: CPT | Performed by: NURSE PRACTITIONER

## 2024-07-24 PROCEDURE — 2014F MENTAL STATUS ASSESS: CPT | Performed by: NURSE PRACTITIONER

## 2024-07-24 PROCEDURE — 1126F AMNT PAIN NOTED NONE PRSNT: CPT | Performed by: NURSE PRACTITIONER

## 2024-07-24 NOTE — PATIENT INSTRUCTIONS
Summer Health Information:     Stay hydrated when outside  If your urine starts to get darker, you are not drinking enough     Protect yourself from ticks and mosquitos  Here are the best ingredients to look for:  DEET (N,N-diethyl-m-toluamide or N,N-diethyl-3-methyl-benzamide)  Picaridin  Oil of lemon eucalyptus (p-menthane-3,8-diol or PMD)  Wear light-colored pants so you can spot ticks easier  If you use a permethrin product, ONLY apply to clothing    Practice Safe Sun!    Use sun screen SPF >30 daily, reapply regularly per directions on package  Apply 15 mins before going outside  If swimming, re-apply every 45 mins  See dermatologist for skin check regularly  Protect your eyes with sunglasses with UV protection    Poison Ivy  If you are going into areas that may have poison ivy, prepare with a product like IvyX or other ivy blocker.  Wash your clothes and pets after being in an area with ivy when returning home. If you come in contact with poison ivy, try a product like Technu     Other things you should incorporate all year...     Diet:    Eat vegetables, fruits, whole grain, low-fat dairy, poultry, fish, beans, nontropical vegetable oils, and nuts, but avoid red meat (i.e., Mediterranean-style diet, DASH [Dietary Approaches to Stop Hypertension] diet).  Limit sugary drinks and sweets.  Limit saturated and trans fat to 5% to 6% of calories.  Limit sodium intake to 2,400 mg daily (about one teaspoon table salt [kosher/sea salt have less sodium per teaspoon]).  https://www.eatright.org/    Weight loss / Calorie Counting Apps:    Lose It!   ApplyMap Pal   Works great when you try it with a partner/ friend. It takes about 15 minutes a day but studies show that this simple method of monitoring your intake can help you achieve goals as it keeps you accountable.  I often will ask patients to try these apps just to get an idea of how much sodium and how many carbohydrates you are taking in.   Exercise:   Engage in  a moderate aerobic exercise routine (walking, biking, swimming, dancing, sports, anything that makes you move your body). Start with low intensity and short sessions of 15 minutes 3 times a week to test your tolerance and build up endurance. Increase gradually until you reach a goal of 150 minutes per week of accumulated moderate-intensity exercise.    Wearables:   Activity tracker   Step tracker: getting 7,500 steps daily can cut your cardiac risks by 44%     Bone Health:   [email or web address]  Routine weight bearing exercise        LAB and TEST results will be available on [email or web address]   If you have no access to eeGeo, then we will have our office to notify you the results.   If we can not reach you by phone, we will then send you a letter with the results.

## 2024-07-24 NOTE — ASSESSMENT & PLAN NOTE
Psychological condition is stable.     Continues wellbutrin and continues therapy which he states is helpful.

## 2024-07-24 NOTE — ASSESSMENT & PLAN NOTE
Follow antireflux/GERD precautions:     Avoiding eating within 3 to 4 hours of bedtime.    Avoid foods that can trigger symptoms which may include:  citrus fruits  spicy foods  tomatoes  red sauces  chocolate  coffee/tea  caffeinated or carbonated beverages  alcohol

## 2024-07-24 NOTE — PROGRESS NOTES
"        Chief Complaint  Annual Exam     Subjective:      History of Present Illness {CC  Problem List  Visit  Diagnosis   Encounters  Notes  Medications  Labs  Result Review Imaging  Media :23}     Wilber Hopkins presents to [facility] for:  annual exam    He chronically has GERD, IBS, depression/anxiety.     GERD: being followed by GI  LV: 7/18/24: advised fiber, continue dexliant.   States will have constipation at times.     BH: CASSANDRA Bustillos  States wellbutrin helps with depression and vistaril for anxiety when needed.       Wilber is here for coordination of medical care, to discuss health maintenance, disease prevention as well as to follow up on medical problems.     Patient Care Team:  Charito Avila III, NP-C as PCP - General (Internal Medicine)      He states that his activity level is heavy.   Exercise: multiple sports    His diet is in general, a \"healthy\" diet  , states could eat less sugar: cookies/chocolate .       Health and Weight:   Weight trend is   Wt Readings from Last 4 Encounters:   07/24/24 68.9 kg (152 lb)   07/18/24 69.2 kg (152 lb 9.6 oz)   04/18/24 69.7 kg (153 lb 9.6 oz)   03/06/24 63.5 kg (140 lb)       Mental Health Check:   Depression screen: PHQ-2 Total Score: 0     Blood Pressure:   BP Readings from Last 3 Encounters:   07/24/24 122/80   07/18/24 115/71   04/18/24 119/72        Cholesterol Screen:   Most men start screen at 35, if you have family history or comorbidities it may be screen earlier.     Risk Evaluation:  1. Cardiovascular risk factors: male gender.  2. Diabetes risk factors: none.   3. Cancer risk factors: no risk factors for cancer.     Prevention:   Cholesterol and glucose screen due.   Hepatitis  C screen: [] Due  [x] Completed :     Colon Cancer Screen:   UTD: 9/2023: Jermain   Family history: [x] None    [] Yes:     Genital/ Urinary Health:   He voids without difficulty.  He is not sexually active.      STI Screen: "   [] HIV     [] Syphilis   [] Chlamydia/ Gonorrhea   [x] Declines STD screen  If tests ordered, patient was informed HIV results will not show up on my chart.     Testicular cancer Screen:   Testicular self exams recommended once a month.   Advised that any firm testicular nodules to be reported immediately.    Prostate cancer screening:    Family history: [x] None    [] Yes:     Lung Cancer Screen:   [x] Nonsmoker  [] History of smoking  []     Tobacco Use: Low Risk  (7/24/2024)    Patient History     Smoking Tobacco Use: Never     Smokeless Tobacco Use: Never     Passive Exposure: Not on file        Vaccines Due:   [] Prevnar 20     [] Flu  [] Shingrix (shingles: series of 2)   []   [] COVID (booster)     [] Declines vaccines     Last eye exam: UTD: no change in vision   Always where sunglass when outside with UV protection.      Skin Cancer:   Regular Sunsceen: Advised  Routine self assessment of your skin, report any changes.      Will go back to [facility]  in Jan.   [employer].     [facility] on Saturdays       I have reviewed patient's medical history, any new submitted information provided by patient or medical assistant and updated medical record.      Objective:      Physical Exam  Vitals reviewed.   Constitutional:       Appearance: Normal appearance. He is well-developed.   HENT:      Head: Normocephalic and atraumatic.      Right Ear: External ear normal.      Left Ear: External ear normal.      Nose: Nose normal.   Eyes:      Conjunctiva/sclera: Conjunctivae normal.      Pupils: Pupils are equal, round, and reactive to light.   Neck:      Thyroid: No thyromegaly.      Vascular: No JVD.   Cardiovascular:      Rate and Rhythm: Normal rate and regular rhythm.      Pulses: Normal pulses.           Radial pulses are 2+ on the right side and 2+ on the left side.      Heart sounds: Normal heart sounds, S1 normal and S2 normal. No murmur heard.     No friction rub. No gallop.  "  Pulmonary:      Effort: Pulmonary effort is normal.      Breath sounds: Normal breath sounds.   Chest:      Chest wall: No deformity.   Abdominal:      General: Bowel sounds are normal.      Palpations: Abdomen is soft.      Tenderness: There is no abdominal tenderness. Negative signs include Torrez's sign.      Hernia: No hernia is present.   Musculoskeletal:      Cervical back: Normal range of motion and neck supple.      Right lower leg: No edema.      Left lower leg: No edema.   Lymphadenopathy:      Cervical: No cervical adenopathy.   Skin:     General: Skin is warm and dry.      Capillary Refill: Capillary refill takes 2 to 3 seconds.      Nails: There is no clubbing.   Neurological:      General: No focal deficit present.      Mental Status: He is alert and oriented to person, place, and time.      Sensory: No sensory deficit.      Motor: Motor function is intact.   Psychiatric:         Mood and Affect: Mood normal.         Speech: Speech normal.         Behavior: Behavior normal. Behavior is cooperative.         Thought Content: Thought content normal.         Judgment: Judgment normal.        Result Review  Data Reviewed:{ Labs  Result Review  Imaging  Med Tab  Media :23}     The following data was reviewed by: Charito Avila III, NP-C on 07/24/2024  Common labs          8/3/2023    11:50 1/1/2024    20:01   Common Labs   Glucose 104  103    BUN 14  19    Creatinine 1.01  1.09    Sodium 138  138    Potassium 3.7  4.0    Chloride 103  103    Calcium 9.4  9.8    Albumin 4.4  5.0    Total Bilirubin 0.7  0.4    Alkaline Phosphatase 66  104    AST (SGOT) 21  15    ALT (SGPT) 16  14    WBC 4.56  9.52    Hemoglobin 15.4  15.6    Hematocrit 44.3  45.8    Platelets 153  191             Vital Signs:   /80 (BP Location: Left arm, Patient Position: Sitting, Cuff Size: Adult)   Pulse 64   Ht 154.9 cm (61\")   Wt 68.9 kg (152 lb)   SpO2 99%   BMI 28.72 kg/m²   Estimated body mass index is " "28.72 kg/m² as calculated from the following:    Height as of this encounter: 154.9 cm (61\").    Weight as of this encounter: 68.9 kg (152 lb).        Requested Prescriptions      No prescriptions requested or ordered in this encounter       Routine medications provided by this office will also be refilled via pharmacy request.       Current Outpatient Medications:     buPROPion XL (WELLBUTRIN XL) 150 MG 24 hr tablet, Take 1 tablet by mouth Daily., Disp: , Rfl:     dexlansoprazole (DEXILANT) 60 MG capsule, Take 1 capsule by mouth Daily., Disp: , Rfl:     hydrOXYzine pamoate (VISTARIL) 25 MG capsule, Take 1 capsule by mouth Daily As Needed., Disp: , Rfl:     Multiple Vitamin (MULTIVITAMIN PO), Take  by mouth., Disp: , Rfl:      Assessment and Plan:      Assessment and Plan {CC Problem List  Visit Diagnosis  ROS  Review (Popup)  [facility] Health Maintenance  Quality  BestPractice  Medications  SmartSets  SnapShot Encounters  Media :23}     Diagnoses and all orders for this visit:    1. Annual physical exam (Primary)  -     Comprehensive Metabolic Panel  -     CBC (No Diff)  -     Hemoglobin A1c    2. Irritable bowel syndrome with diarrhea  Assessment & Plan:  Follow up with GI        3. Gastroesophageal reflux disease, unspecified whether esophagitis present  Assessment & Plan:  Follow antireflux/GERD precautions:     Avoiding eating within 3 to 4 hours of bedtime.    Avoid foods that can trigger symptoms which may include:  citrus fruits  spicy foods  tomatoes  red sauces  chocolate  coffee/tea  caffeinated or carbonated beverages  alcohol         4. Other specified anxiety disorders  Assessment & Plan:  Psychological condition is stable.     Continues wellbutrin and continues therapy which he states is helpful.                No orders of the defined types were placed in this encounter.          Follow Up {Instructions Charge Capture  Follow-up Communications :23}     Return in about 1 year (around 7/24/2025) " for Annual physical.      Patient was given instructions and counseling regarding his condition or for health maintenance advice. Please see specific information pulled into the AVS if appropriate.    Steven disclaimer:   Much of this encounter note is an electronic transcription/translation of spoken language to printed text. The electronic translation of spoken language may permit erroneous, or at times, nonsensical words or phrases to be inadvertently transcribed; Although I have reviewed the note for such errors, some may still exist.     Additional Patient Counseling:       Patient Instructions     Summer Health Information:     Stay hydrated when outside  If your urine starts to get darker, you are not drinking enough     Protect yourself from ticks and mosquitos  Here are the best ingredients to look for:  DEET (N,N-diethyl-m-toluamide or N,N-diethyl-3-methyl-benzamide)  Picaridin  Oil of lemon eucalyptus (p-menthane-3,8-diol or PMD)  Wear light-colored pants so you can spot ticks easier  If you use a permethrin product, ONLY apply to clothing    Practice Safe Sun!    Use sun screen SPF >30 daily, reapply regularly per directions on package  Apply 15 mins before going outside  If swimming, re-apply every 45 mins  See dermatologist for skin check regularly  Protect your eyes with sunglasses with UV protection    Poison Ivy  If you are going into areas that may have poison ivy, prepare with a product like IvyX or other ivy blocker.  Wash your clothes and pets after being in an area with ivy when returning home. If you come in contact with poison ivy, try a product like Technu     Other things you should incorporate all year...     Diet:    Eat vegetables, fruits, whole grain, low-fat dairy, poultry, fish, beans, nontropical vegetable oils, and nuts, but avoid red meat (i.e., Mediterranean-style diet, DASH [Dietary Approaches to Stop Hypertension] diet).  Limit sugary drinks and sweets.  Limit saturated and  trans fat to 5% to 6% of calories.  Limit sodium intake to 2,400 mg daily (about one teaspoon table salt [kosher/sea salt have less sodium per teaspoon]).  https://www.eatright.org/    Weight loss / Calorie Counting Apps:    Lose It!   Ensighten Pal   Works great when you try it with a partner/ friend. It takes about 15 minutes a day but studies show that this simple method of monitoring your intake can help you achieve goals as it keeps you accountable.  I often will ask patients to try these apps just to get an idea of how much sodium and how many carbohydrates you are taking in.   Exercise:   Engage in a moderate aerobic exercise routine (walking, biking, swimming, dancing, sports, anything that makes you move your body). Start with low intensity and short sessions of 15 minutes 3 times a week to test your tolerance and build up endurance. Increase gradually until you reach a goal of 150 minutes per week of accumulated moderate-intensity exercise.    Wearables:   Activity tracker   Step tracker: getting 7,500 steps daily can cut your cardiac risks by 44%     Bone Health:   [email or web address]/patients/treatment/[email or web address]  Routine weight bearing exercise        LAB and TEST results will be available on [email or web address]   If you have no access to Hipmunk, then we will have our office to notify you the results.   If we can not reach you by phone, we will then send you a letter with the results.

## 2024-07-25 LAB
ALBUMIN SERPL-MCNC: 4.7 G/DL (ref 4.3–5.2)
ALP SERPL-CCNC: 86 IU/L (ref 44–121)
ALT SERPL-CCNC: 17 IU/L (ref 0–44)
AST SERPL-CCNC: 17 IU/L (ref 0–40)
BILIRUB SERPL-MCNC: 0.2 MG/DL (ref 0–1.2)
BUN SERPL-MCNC: 15 MG/DL (ref 6–20)
BUN/CREAT SERPL: 16 (ref 9–20)
CALCIUM SERPL-MCNC: 9.4 MG/DL (ref 8.7–10.2)
CHLORIDE SERPL-SCNC: 102 MMOL/L (ref 96–106)
CO2 SERPL-SCNC: 25 MMOL/L (ref 20–29)
CREAT SERPL-MCNC: 0.96 MG/DL (ref 0.76–1.27)
EGFRCR SERPLBLD CKD-EPI 2021: 114 ML/MIN/1.73
ERYTHROCYTE [DISTWIDTH] IN BLOOD BY AUTOMATED COUNT: 15.1 % (ref 11.6–15.4)
GLOBULIN SER CALC-MCNC: 2.3 G/DL (ref 1.5–4.5)
GLUCOSE SERPL-MCNC: 97 MG/DL (ref 70–99)
HBA1C MFR BLD: 5.8 % (ref 4.8–5.6)
HCT VFR BLD AUTO: 41.1 % (ref 37.5–51)
HGB BLD-MCNC: 12.7 G/DL (ref 13–17.7)
MCH RBC QN AUTO: 25.7 PG (ref 26.6–33)
MCHC RBC AUTO-ENTMCNC: 30.9 G/DL (ref 31.5–35.7)
MCV RBC AUTO: 83 FL (ref 79–97)
PLATELET # BLD AUTO: 181 X10E3/UL (ref 150–450)
POTASSIUM SERPL-SCNC: 4.3 MMOL/L (ref 3.5–5.2)
PROT SERPL-MCNC: 7 G/DL (ref 6–8.5)
RBC # BLD AUTO: 4.94 X10E6/UL (ref 4.14–5.8)
SODIUM SERPL-SCNC: 141 MMOL/L (ref 134–144)
WBC # BLD AUTO: 4.1 X10E3/UL (ref 3.4–10.8)

## 2024-07-26 ENCOUNTER — TELEPHONE (OUTPATIENT)
Dept: INTERNAL MEDICINE | Facility: CLINIC | Age: 24
End: 2024-07-26
Payer: COMMERCIAL

## 2024-07-26 DIAGNOSIS — D64.9 ANEMIA, UNSPECIFIED TYPE: Primary | ICD-10-CM

## 2024-07-26 NOTE — TELEPHONE ENCOUNTER
Left Voice Mail for moustaphaSajan WATKINS TO READ     ----- Message from Charito Avila sent at 7/26/2024 12:54 PM EDT -----  Please call and notify  Mr. Hopkins,     Recent lab work is normal except his hemoglobin has decrease a little.     I would like him to recheck his labs in about 3 weeks downstairs.   He DOES need to decrease his sugar intake.  He is pre-diabetic.   Limit carbs and sugars.        WCN         Mr. Hopkins,     I have placed a lab order for you at our outpatient [facility].     It is located on the first floor of our building at:   [address] [address]    You do not need an appointment.   It is open from Monday - Friday (except holidays) during normal business hours.    Generally 7:30am to 4pm.  They do close for 1/2 hour during lunch.       [ ] You do NOT need to be fasting for your lab work.       Lab: CBC, ferritin, b-12, tsh

## 2024-07-26 NOTE — TELEPHONE ENCOUNTER
Caller: Wilber Hopkins    Relationship: Self    Best call back number: [phone]     What is the best time to reach you: ANYTIME TODAY    Who are you requesting to speak with (clinical staff, provider,  specific staff member): CLINICAL    What was the call regarding: MISSED CALL FROM Corinna TO DISCUSS LAB RESULTS.

## 2024-07-26 NOTE — PROGRESS NOTES
Please call and notify  Mr. Hopkins,     Recent lab work is normal except his hemoglobin has decrease a little.     I would like him to recheck his labs in about 3 weeks downstairs.   He DOES need to decrease his sugar intake.  He is pre-diabetic.   Limit carbs and sugars.        WCN        Mr. Hopkins,     I have placed a lab order for you at our outpatient [facility].     It is located on the first floor of our building at:   [address] [address]    You do not need an appointment.   It is open from Monday - Friday (except holidays) during normal business hours.    Generally 7:30am to 4pm.  They do close for 1/2 hour during lunch.       [ ] You do NOT need to be fasting for your lab work.       Lab: CBC, ferritin, b-12, tsh

## 2024-08-15 ENCOUNTER — NURSE TRIAGE (OUTPATIENT)
Dept: CALL CENTER | Facility: HOSPITAL | Age: 24
End: 2024-08-15
Payer: COMMERCIAL

## 2024-08-15 ENCOUNTER — APPOINTMENT (OUTPATIENT)
Dept: GENERAL RADIOLOGY | Facility: HOSPITAL | Age: 24
End: 2024-08-15
Payer: COMMERCIAL

## 2024-08-15 ENCOUNTER — HOSPITAL ENCOUNTER (EMERGENCY)
Facility: HOSPITAL | Age: 24
Discharge: HOME | End: 2024-08-15
Attending: EMERGENCY MEDICINE
Payer: COMMERCIAL

## 2024-08-15 VITALS
BODY MASS INDEX: 20.3 KG/M2 | HEART RATE: 79 BPM | SYSTOLIC BLOOD PRESSURE: 127 MMHG | TEMPERATURE: 97.9 F | RESPIRATION RATE: 18 BRPM | WEIGHT: 145 LBS | HEIGHT: 71 IN | DIASTOLIC BLOOD PRESSURE: 71 MMHG | OXYGEN SATURATION: 100 %

## 2024-08-15 DIAGNOSIS — F41.9 ANXIETY: Primary | ICD-10-CM

## 2024-08-15 LAB
ALBUMIN SERPL-MCNC: 4.9 G/DL (ref 3.5–5.2)
ALBUMIN/GLOB SERPL: 1.9 G/DL
ALP SERPL-CCNC: 83 U/L (ref 39–117)
ALT SERPL W P-5'-P-CCNC: 17 U/L (ref 1–41)
ANION GAP SERPL CALCULATED.3IONS-SCNC: 9.6 MMOL/L (ref 5–15)
AST SERPL-CCNC: 16 U/L (ref 1–40)
BASOPHILS # BLD AUTO: 0.03 10*3/MM3 (ref 0–0.2)
BASOPHILS NFR BLD AUTO: 0.5 % (ref 0–1.5)
BILIRUB SERPL-MCNC: 0.5 MG/DL (ref 0–1.2)
BUN SERPL-MCNC: 21 MG/DL (ref 6–20)
BUN/CREAT SERPL: 23.3 (ref 7–25)
CALCIUM SPEC-SCNC: 9.8 MG/DL (ref 8.6–10.5)
CHLORIDE SERPL-SCNC: 102 MMOL/L (ref 98–107)
CO2 SERPL-SCNC: 26.4 MMOL/L (ref 22–29)
CREAT SERPL-MCNC: 0.9 MG/DL (ref 0.76–1.27)
DEPRECATED RDW RBC AUTO: 42.2 FL (ref 37–54)
EGFRCR SERPLBLD CKD-EPI 2021: 123.1 ML/MIN/1.73
EOSINOPHIL # BLD AUTO: 0.01 10*3/MM3 (ref 0–0.4)
EOSINOPHIL NFR BLD AUTO: 0.2 % (ref 0.3–6.2)
ERYTHROCYTE [DISTWIDTH] IN BLOOD BY AUTOMATED COUNT: 14 % (ref 12.3–15.4)
GLOBULIN UR ELPH-MCNC: 2.6 GM/DL
GLUCOSE SERPL-MCNC: 106 MG/DL (ref 65–99)
HCT VFR BLD AUTO: 41.3 % (ref 37.5–51)
HGB BLD-MCNC: 13.3 G/DL (ref 13–17.7)
IMM GRANULOCYTES # BLD AUTO: 0 10*3/MM3 (ref 0–0.05)
IMM GRANULOCYTES NFR BLD AUTO: 0 % (ref 0–0.5)
LYMPHOCYTES # BLD AUTO: 1.64 10*3/MM3 (ref 0.7–3.1)
LYMPHOCYTES NFR BLD AUTO: 24.6 % (ref 19.6–45.3)
MAGNESIUM SERPL-MCNC: 2.1 MG/DL (ref 1.6–2.6)
MCH RBC QN AUTO: 26.3 PG (ref 26.6–33)
MCHC RBC AUTO-ENTMCNC: 32.2 G/DL (ref 31.5–35.7)
MCV RBC AUTO: 81.6 FL (ref 79–97)
MONOCYTES # BLD AUTO: 0.39 10*3/MM3 (ref 0.1–0.9)
MONOCYTES NFR BLD AUTO: 5.9 % (ref 5–12)
NEUTROPHILS NFR BLD AUTO: 4.59 10*3/MM3 (ref 1.7–7)
NEUTROPHILS NFR BLD AUTO: 68.8 % (ref 42.7–76)
PLATELET # BLD AUTO: 220 10*3/MM3 (ref 140–450)
PMV BLD AUTO: 11.8 FL (ref 6–12)
POTASSIUM SERPL-SCNC: 3.8 MMOL/L (ref 3.5–5.2)
PROT SERPL-MCNC: 7.5 G/DL (ref 6–8.5)
RBC # BLD AUTO: 5.06 10*6/MM3 (ref 4.14–5.8)
SODIUM SERPL-SCNC: 138 MMOL/L (ref 136–145)
TROPONIN T SERPL HS-MCNC: 10 NG/L
WBC NRBC COR # BLD AUTO: 6.66 10*3/MM3 (ref 3.4–10.8)

## 2024-08-15 PROCEDURE — 83735 ASSAY OF MAGNESIUM: CPT | Performed by: EMERGENCY MEDICINE

## 2024-08-15 PROCEDURE — 80053 COMPREHEN METABOLIC PANEL: CPT | Performed by: EMERGENCY MEDICINE

## 2024-08-15 PROCEDURE — 36415 COLL VENOUS BLD VENIPUNCTURE: CPT

## 2024-08-15 PROCEDURE — 99284 EMERGENCY DEPT VISIT MOD MDM: CPT | Performed by: PHYSICIAN ASSISTANT

## 2024-08-15 PROCEDURE — 84484 ASSAY OF TROPONIN QUANT: CPT | Performed by: EMERGENCY MEDICINE

## 2024-08-15 PROCEDURE — 99284 EMERGENCY DEPT VISIT MOD MDM: CPT

## 2024-08-15 PROCEDURE — 85025 COMPLETE CBC W/AUTO DIFF WBC: CPT | Performed by: EMERGENCY MEDICINE

## 2024-08-15 PROCEDURE — 93010 ELECTROCARDIOGRAM REPORT: CPT | Performed by: INTERNAL MEDICINE

## 2024-08-15 PROCEDURE — 71045 X-RAY EXAM CHEST 1 VIEW: CPT

## 2024-08-15 PROCEDURE — 93005 ELECTROCARDIOGRAM TRACING: CPT | Performed by: EMERGENCY MEDICINE

## 2024-08-15 RX ORDER — ALPRAZOLAM 0.25 MG/1
0.5 TABLET ORAL ONCE
Status: COMPLETED | OUTPATIENT
Start: 2024-08-15 | End: 2024-08-15

## 2024-08-15 RX ADMIN — ALPRAZOLAM 0.5 MG: 0.25 TABLET ORAL at 14:14

## 2024-08-15 NOTE — TELEPHONE ENCOUNTER
Spoke to patient, he states the chest pain has been going on for a few days. He takes anxiety/depression medicine and has been out for three days. Just received refill today, has something personal going on so he thought that might be the cause of his chest pain.     Had a panic attack on Tuesday and had SOB, next day had stomach pain for the next 2 days.Has an appointment with his psychiatrist today at 1:00 and will discuss with them.

## 2024-08-15 NOTE — FSED PROVIDER NOTE
Subjective   History of Present Illness    Patient, history of anxiety, depression, PTSD, gastritis, OCD, is complaining of worsening with anxiety which started about 3 days ago and significantly got worse this morning when he woke up around 9 AM.  Patient also developed chest pressure this morning around 10 AM and it is constant, worse with deep breaths.  Denies any nausea or vomiting.  Denies any suicidal homicidal ideation.  He does report recent stressors with family.    Patient reports that he was at his psychiatrist this morning to get a refill of his Wellbutrin which he has not taken for about 3 days when his psychiatrist advised him to come here for further evaluation of his chest pain.    Review of Systems   Constitutional:  Negative for activity change and appetite change.   Eyes:  Negative for pain.   Respiratory:  Negative for shortness of breath.    Cardiovascular:  Positive for chest pain.   Gastrointestinal:  Negative for nausea and vomiting.   Musculoskeletal:  Negative for arthralgias.   Skin:  Negative for color change.   Neurological:  Negative for dizziness.   All other systems reviewed and are negative.      Past Medical History:   Diagnosis Date    Anxiety     Depression     Gastritis     GERD (gastroesophageal reflux disease)     IBS (irritable bowel syndrome)     Nausea and vomiting 07/08/2020    OCD (obsessive compulsive disorder)     Panic attack     Proctitis     PTSD (post-traumatic stress disorder)        Allergies   Allergen Reactions    Amoxicillin GI Intolerance    Penicillins GI Intolerance       Past Surgical History:   Procedure Laterality Date    COLONOSCOPY N/A 07/18/2020    Procedure: COLONOSCOPY to cecum and t.i. with biopsies;  Surgeon: Tong Aly MD;  Location: [facility] ENDOSCOPY;  Service: Gastroenterology;  Laterality: N/A;  pre-  abd pain, n/v, diarrhea  post- normal    COLONOSCOPY N/A 8/14/2023    Procedure: COLONOSCOPY to cecum and ti;  Surgeon: Tong Aly MD;   Location: [facility] ENDOSCOPY;  Service: Gastroenterology;  Laterality: N/A;  PRE: abn imaging, constipation/diarrhea  POST: hemorrhoids    ENDOSCOPY N/A 07/18/2020    Procedure: ESOPHAGOGASTRODUODENOSCOPY with biopsies;  Surgeon: Tong Aly MD;  Location: [facility] ENDOSCOPY;  Service: Gastroenterology;  Laterality: N/A;  pre- abd pain, n/v, diarrhea  post- gastritis    ENDOSCOPY N/A 8/14/2023    Procedure: ESOPHAGOGASTRODUODENOSCOPY with cold bxs;  Surgeon: Tong Aly MD;  Location: [facility] ENDOSCOPY;  Service: Gastroenterology;  Laterality: N/A;  PRE: dyspepsia, abn imaging  POST: gastritis       Family History   Problem Relation Age of Onset    Anxiety disorder Mother     Mental illness Father         Brain Damage causing mental issues    Prostate cancer Neg Hx     Colon cancer Neg Hx     Breast cancer Neg Hx        Social History     Socioeconomic History    Marital status: Single   Tobacco Use    Smoking status: Never    Smokeless tobacco: Never   Vaping Use    Vaping status: Never Used   Substance and Sexual Activity    Alcohol use: No    Drug use: No    Sexual activity: Defer           Objective   Physical Exam  Vitals and nursing note reviewed.   Constitutional:       Appearance: Normal appearance. He is normal weight.   HENT:      Head: Normocephalic and atraumatic.      Nose: Nose normal.      Mouth/Throat:      Mouth: Mucous membranes are moist.   Eyes:      Pupils: Pupils are equal, round, and reactive to light.   Cardiovascular:      Rate and Rhythm: Normal rate and regular rhythm.      Pulses: Normal pulses.      Heart sounds: Normal heart sounds.   Pulmonary:      Effort: Pulmonary effort is normal.      Breath sounds: Normal breath sounds.   Musculoskeletal:         General: Normal range of motion.      Cervical back: Normal range of motion.      Right lower leg: No edema.      Left lower leg: No edema.   Skin:     General: Skin is warm.   Neurological:      General: No focal deficit present.       Mental Status: He is alert.   Psychiatric:         Behavior: Behavior is cooperative.         ECG 12 Lead      Date/Time: 8/15/2024 4:08 PM    Performed by: Romana Colvin PA-C  Authorized by: Roque Langston MD  Interpreted by ED physician  Comparison: compared with previous ECG from 4/20/2022  Similar to previous ECG  Rhythm: sinus rhythm  BPM: 61  Conduction: conduction normal  ST Segments: ST segments normal  T Waves: T waves normal  Other: no other findings               ED Course                                           Medical Decision Making  Problems Addressed:  Anxiety: complicated acute illness or injury    Amount and/or Complexity of Data Reviewed  Labs: ordered.  Radiology: ordered.  ECG/medicine tests: ordered.    Risk  Prescription drug management.        Final diagnoses:   Anxiety       ED Disposition  ED Disposition       ED Disposition   Discharge    Condition   Stable    Comment   --               Charito Avila III, NP-C  [address] [address] [phone]    Call today           Medication List      No changes were made to your prescriptions during this visit.

## 2024-08-15 NOTE — TELEPHONE ENCOUNTER
"Reason for Disposition  • [1] Chest pain lasts > 5 minutes AND [2] occurred > 3 days ago (72 hours) AND [3] NO chest pain or cardiac symptoms now    Additional Information  • Negative: SEVERE difficulty breathing (e.g., struggling for each breath, speaks in single words)  • Negative: Difficult to awaken or acting confused (e.g., disoriented, slurred speech)  • Negative: Shock suspected (e.g., cold/pale/clammy skin, too weak to stand, low BP, rapid pulse)  • Negative: Passed out (i.e., lost consciousness, collapsed and was not responding)  • Negative: [1] Chest pain lasts > 5 minutes AND [2] age > 44  • Negative: [1] Chest pain lasts > 5 minutes AND [2] age > 30 AND [3] one or more cardiac risk factors (e.g., diabetes, high blood pressure, high cholesterol, smoker, or strong family history of heart disease)  • Negative: [1] Chest pain lasts > 5 minutes AND [2] history of heart disease (i.e., angina, heart attack, heart failure, bypass surgery, takes nitroglycerin)  • Negative: [1] Chest pain lasts > 5 minutes AND [2] described as crushing, pressure-like, or heavy  • Negative: Heart beating < 50 beats per minute OR > 140 beats per minute  • Negative: Visible sweat on face or sweat dripping down face  • Negative: Sounds like a life-threatening emergency to the triager  • Negative: Followed a chest injury  • Negative: SEVERE chest pain  • Negative: [1] Chest pain (or \"angina\") comes and goes AND [2] is happening more often (increasing in frequency) or getting worse (increasing in severity)  (Exception: Chest pains that last only a few seconds.)  • Negative: Pain also in shoulder(s) or arm(s) or jaw  (Exception: Pain is clearly made worse by movement.)  • Negative: Difficulty breathing  • Negative: Dizziness or lightheadedness  • Negative: Coughing up blood  • Negative: Cocaine use within last 3 days  • Negative: Major surgery in past month  • Negative: Hip or leg fracture (broken bone) in past month (or had cast on leg " "or ankle in past month)  • Negative: Illness requiring prolonged bedrest in past month (e.g., immobilization, long hospital stay)  • Negative: Long-distance travel in past month (e.g., car, bus, train, plane; with trip lasting 6 or more hours)  • Negative: History of prior \"blood clot\" in leg or lungs (i.e., deep vein thrombosis, pulmonary embolism)  • Negative: History of inherited increased risk of blood clots (e.g., Factor 5 Leiden, Anti-thrombin 3, Protein C or Protein S deficiency, Prothrombin mutation)  • Negative: Cancer treatment in past six months (or has cancer now)  • Negative: [1] Chest pain lasts > 5 minutes AND [2] occurred in past 3 days (72 hours) (Exception: Feels exactly the same as previously diagnosed heartburn and has accompanying sour taste in mouth.)  • Negative: Taking a deep breath makes pain worse  • Negative: Patient sounds very sick or weak to the triager    Answer Assessment - Initial Assessment Questions  1. LOCATION: \"Where does it hurt?\"        Mid chest  2. RADIATION: \"Does the pain go anywhere else?\" (e.g., into neck, jaw, arms, back)      dneies  3. ONSET: \"When did the chest pain begin?\" (Minutes, hours or days)       Couple of weeks ago  4. PATTERN: \"Does the pain come and go, or has it been constant since it started?\"  \"Does it get worse with exertion?\"       Comes and goes, nothing makes better or worse  5. DURATION: \"How long does it last\" (e.g., seconds, minutes, hours)      minutes  6. SEVERITY: \"How bad is the pain?\"  (e.g., Scale 1-10; mild, moderate, or severe)     - MILD (1-3): doesn't interfere with normal activities      - MODERATE (4-7): interferes with normal activities or awakens from sleep     - SEVERE (8-10): excruciating pain, unable to do any normal activities        moderate  7. CARDIAC RISK FACTORS: \"Do you have any history of heart problems or risk factors for heart disease?\" (e.g., angina, prior heart attack; diabetes, high blood pressure, high cholesterol, " "smoker, or strong family history of heart disease)      dneies  8. PULMONARY RISK FACTORS: \"Do you have any history of lung disease?\"  (e.g., blood clots in lung, asthma, emphysema, birth control pills)      dneies  9. CAUSE: \"What do you think is causing the chest pain?\"      anxiety  10. OTHER SYMPTOMS: \"Do you have any other symptoms?\" (e.g., dizziness, nausea, vomiting, sweating, fever, difficulty breathing, cough)        Nausea, decrease appetite  11. PREGNANCY: \"Is there any chance you are pregnant?\" \"When was your last menstrual period?\"        na    Protocols used: Chest Pain-ADULT-AH    "

## 2024-08-15 NOTE — DISCHARGE INSTRUCTIONS
It is important that you continue previous prescribed Wellbutrin and hydroxyzine.  Follow-up with your primary care doctor next week to recheck your symptoms.  Light activity for the next few days.

## 2024-08-16 LAB
QT INTERVAL: 377 MS
QTC INTERVAL: 380 MS

## 2024-08-27 ENCOUNTER — TELEPHONE (OUTPATIENT)
Dept: GASTROENTEROLOGY | Facility: CLINIC | Age: 24
End: 2024-08-27

## 2024-08-27 RX ORDER — DEXLANSOPRAZOLE 60 MG/1
1 CAPSULE, DELAYED RELEASE ORAL DAILY
Qty: 90 CAPSULE | Refills: 1 | Status: SHIPPED | OUTPATIENT
Start: 2024-08-27

## 2024-09-09 ENCOUNTER — TELEPHONE (OUTPATIENT)
Dept: GASTROENTEROLOGY | Facility: CLINIC | Age: 24
End: 2024-09-09
Payer: COMMERCIAL

## 2024-09-09 NOTE — TELEPHONE ENCOUNTER
Caller: Wilber Hopkins    Relationship to patient: Self    Best call back number: [phone]     Patient is needing: PRESCRIPTION HAS BEEN SENT BUT PATIENT WAS TOLD BY PHARMACY THAT THE INSURANCE IS NEEDING APPROVAL AND NECESSITY INFORMATION FROM DOCTOR (PRIOR AUTHORIZATION?) TO FILL. PATIENT HAS BEEN OUT FOR ABOUT A WEEK.     PLEASE SEND AS SOON AS POSSIBLE.     dexlansoprazole (DEXILANT) 60 MG capsule [46636] (Order [id number])     [id number] [place] [address] [phone] [facility] [phone]

## 2024-09-12 PROCEDURE — 99283 EMERGENCY DEPT VISIT LOW MDM: CPT

## 2024-09-13 ENCOUNTER — TELEPHONE (OUTPATIENT)
Dept: GASTROENTEROLOGY | Facility: CLINIC | Age: 24
End: 2024-09-13
Payer: COMMERCIAL

## 2024-09-13 ENCOUNTER — HOSPITAL ENCOUNTER (EMERGENCY)
Facility: HOSPITAL | Age: 24
Discharge: HOME | End: 2024-09-13
Attending: EMERGENCY MEDICINE
Payer: COMMERCIAL

## 2024-09-13 VITALS
RESPIRATION RATE: 20 BRPM | OXYGEN SATURATION: 100 % | WEIGHT: 145 LBS | SYSTOLIC BLOOD PRESSURE: 127 MMHG | HEIGHT: 71 IN | BODY MASS INDEX: 20.3 KG/M2 | DIASTOLIC BLOOD PRESSURE: 80 MMHG | TEMPERATURE: 98.7 F | HEART RATE: 72 BPM

## 2024-09-13 DIAGNOSIS — J02.9 PHARYNGITIS, UNSPECIFIED ETIOLOGY: Primary | ICD-10-CM

## 2024-09-13 LAB
FLUAV SUBTYP SPEC NAA+PROBE: NOT DETECTED
FLUBV RNA ISLT QL NAA+PROBE: NOT DETECTED
SARS-COV-2 RNA RESP QL NAA+PROBE: NOT DETECTED
STREP A PCR: NOT DETECTED

## 2024-09-13 PROCEDURE — 99283 EMERGENCY DEPT VISIT LOW MDM: CPT | Performed by: EMERGENCY MEDICINE

## 2024-09-13 PROCEDURE — 87651 STREP A DNA AMP PROBE: CPT

## 2024-09-13 PROCEDURE — 87636 SARSCOV2 & INF A&B AMP PRB: CPT | Performed by: EMERGENCY MEDICINE

## 2024-09-13 RX ORDER — CETIRIZINE HYDROCHLORIDE, PSEUDOEPHEDRINE HYDROCHLORIDE 5; 120 MG/1; MG/1
1 TABLET, FILM COATED, EXTENDED RELEASE ORAL 2 TIMES DAILY PRN
Qty: 14 TABLET | Refills: 0 | Status: SHIPPED | OUTPATIENT
Start: 2024-09-13

## 2024-09-13 NOTE — TELEPHONE ENCOUNTER
PA approved    Approved today by [place] [employer] 2017  The request has been approved. The authorization is effective from 09/13/2024 to 09/13/2025, as long as the member is enrolled in their current health plan. A written notification letter will follow with additional details.  Authorization Expiration Date: 9/12/2025

## 2024-09-13 NOTE — TELEPHONE ENCOUNTER
Caller: Wilber Hopkins    Relationship to patient: Self    Best call back number: [phone]    Patient is needing: PATIENT IS NEEDING ASSISTANCE WITH MEDICATION FOR ACID REFLUX.  STATED DR WOULD CALL INSURANCE COMPANY TO GET THIS APPROVED HOWEVER IT HAS BEEN OVER A WEEK AND PATIENT HAS NOT HEARD ANYTHING.  PATIENT IS STATING ACID REFLUX IS VERY BAD SINCE NOT TAKING MEDICATION.  PLEASE REACH OUT TO ASSIST.      MEDICATION- dexlansoprazole (DEXILANT) 60 MG capsule [[id number]] (Order [id number])

## 2024-09-13 NOTE — TELEPHONE ENCOUNTER
Called patient and le him know his PA was approved and he can call his Pharmacy and have them rerun his RX. ZAYRA

## 2024-09-13 NOTE — TELEPHONE ENCOUNTER
Pt is checking on the status of the PA, please call the pt and let him know where we are on this  Thanks

## 2024-09-13 NOTE — DISCHARGE INSTRUCTIONS
Increase PO fluid intake  Return ED fever, vomiting, shortness of air, worse condition, any other concerns

## 2024-09-13 NOTE — FSED PROVIDER NOTE
Subjective   History of Present Illness  23yo male presents ED c/o 2d hx sore throat/malaise.  Denies fever/vomiting/otalgia/cough/soa.    History provided by:  Patient  Sore Throat      Review of Systems   Constitutional:  Positive for fatigue.   HENT:  Positive for congestion and sore throat.    Eyes: Negative.    Respiratory: Negative.     Cardiovascular: Negative.    Allergic/Immunologic: Negative for immunocompromised state.   All other systems reviewed and are negative.      Past Medical History:   Diagnosis Date    Anxiety     Depression     Gastritis     GERD (gastroesophageal reflux disease)     IBS (irritable bowel syndrome)     Nausea and vomiting 07/08/2020    OCD (obsessive compulsive disorder)     Panic attack     Proctitis     PTSD (post-traumatic stress disorder)        Allergies   Allergen Reactions    Amoxicillin GI Intolerance    Penicillins GI Intolerance       Past Surgical History:   Procedure Laterality Date    COLONOSCOPY N/A 07/18/2020    Procedure: COLONOSCOPY to cecum and t.i. with biopsies;  Surgeon: Tong Aly MD;  Location: [facility] ENDOSCOPY;  Service: Gastroenterology;  Laterality: N/A;  pre-  abd pain, n/v, diarrhea  post- normal    COLONOSCOPY N/A 8/14/2023    Procedure: COLONOSCOPY to cecum and ti;  Surgeon: Tong Aly MD;  Location: [facility] ENDOSCOPY;  Service: Gastroenterology;  Laterality: N/A;  PRE: abn imaging, constipation/diarrhea  POST: hemorrhoids    ENDOSCOPY N/A 07/18/2020    Procedure: ESOPHAGOGASTRODUODENOSCOPY with biopsies;  Surgeon: Tong lAy MD;  Location: [facility] ENDOSCOPY;  Service: Gastroenterology;  Laterality: N/A;  pre- abd pain, n/v, diarrhea  post- gastritis    ENDOSCOPY N/A 8/14/2023    Procedure: ESOPHAGOGASTRODUODENOSCOPY with cold bxs;  Surgeon: Tong Aly MD;  Location: [facility] ENDOSCOPY;  Service: Gastroenterology;  Laterality: N/A;  PRE: dyspepsia, abn imaging  POST: gastritis       Family History   Problem Relation Age of Onset    Anxiety  disorder Mother     Mental illness Father         Brain Damage causing mental issues    Prostate cancer Neg Hx     Colon cancer Neg Hx     Breast cancer Neg Hx        Social History     Socioeconomic History    Marital status: Single   Tobacco Use    Smoking status: Never    Smokeless tobacco: Never   Vaping Use    Vaping status: Never Used   Substance and Sexual Activity    Alcohol use: No    Drug use: No    Sexual activity: Defer           Objective   Physical Exam  Vitals and nursing note reviewed.   Constitutional:       Appearance: Normal appearance.   HENT:      Head: Normocephalic and atraumatic.      Right Ear: Tympanic membrane, ear canal and external ear normal.      Left Ear: Tympanic membrane, ear canal and external ear normal.      Nose: Nose normal. No rhinorrhea.      Mouth/Throat:      Mouth: Mucous membranes are moist.      Pharynx: Oropharynx is clear. Uvula midline. Posterior oropharyngeal erythema present. No pharyngeal swelling, oropharyngeal exudate or uvula swelling.      Tonsils: No tonsillar exudate or tonsillar abscesses.   Eyes:      Pupils: Pupils are equal, round, and reactive to light.   Neck:      Trachea: Trachea and phonation normal.      Meningeal: Brudzinski's sign absent.   Cardiovascular:      Rate and Rhythm: Normal rate and regular rhythm.      Pulses: Normal pulses.      Heart sounds: Normal heart sounds. No murmur heard.     No friction rub. No gallop.   Pulmonary:      Effort: Pulmonary effort is normal. No respiratory distress.      Breath sounds: Normal breath sounds. No wheezing, rhonchi or rales.   Abdominal:      General: Abdomen is flat. Bowel sounds are normal. There is no distension.      Palpations: Abdomen is soft.      Tenderness: There is no abdominal tenderness.   Musculoskeletal:         General: No swelling or deformity.      Cervical back: Normal range of motion and neck supple. No rigidity.   Lymphadenopathy:      Cervical: No cervical adenopathy.   Skin:      General: Skin is warm and dry.   Neurological:      Mental Status: He is alert.      GCS: GCS eye subscore is 4. GCS verbal subscore is 5. GCS motor subscore is 6.         Procedures           ED Course      Labs Reviewed   RAPID STREP A SCREEN - Normal   COVID-19 AND FLU A/B, NP SWAB IN TRANSPORT MEDIA 1 HR TAT - Normal    Narrative:     Fact sheet for providers: https://www.fda.gov/media/852076/download    Fact sheet for patients: https://www.[employer].gov/media/454863/download    Test performed by PCR.                                          Medical Decision Making  Problems Addressed:  Pharyngitis, unspecified etiology: acute illness or injury    Risk  OTC drugs.        Final diagnoses:   Pharyngitis, unspecified etiology       ED Disposition  ED Disposition       ED Disposition   Discharge    Condition   Good    Comment   --               Charito Avila III, JIMI-C  [address] [address]  [phone]    In 2 days           Medication List        New Prescriptions      cetirizine-pseudoephedrine 5-120 MG per 12 hr tablet  Commonly known as: ZyrTEC-D  Take 1 tablet by mouth 2 (Two) Times a Day As Needed for Rhinitis.               Where to Get Your Medications        These medications were sent to [facility] [address] [place] [address] [facility] - [phone]  - [phone]   [id number] [place] [address]      Phone: [phone]   cetirizine-pseudoephedrine 5-120 MG per 12 hr tablet

## 2024-09-13 NOTE — TELEPHONE ENCOUNTER
PA has been initiated, waiting on response.    Wilber Hopkins (Key: [id number])  PA Case ID #: [id number]  Rx #: [id number]

## 2024-10-04 ENCOUNTER — OFFICE VISIT (OUTPATIENT)
Dept: INTERNAL MEDICINE | Facility: CLINIC | Age: 24
End: 2024-10-04
Payer: COMMERCIAL

## 2024-10-04 VITALS
HEART RATE: 93 BPM | OXYGEN SATURATION: 99 % | WEIGHT: 147.5 LBS | BODY MASS INDEX: 20.65 KG/M2 | HEIGHT: 71 IN | DIASTOLIC BLOOD PRESSURE: 72 MMHG | SYSTOLIC BLOOD PRESSURE: 120 MMHG

## 2024-10-04 DIAGNOSIS — G44.209 ACUTE NON INTRACTABLE TENSION-TYPE HEADACHE: Primary | ICD-10-CM

## 2024-10-04 PROCEDURE — 1125F AMNT PAIN NOTED PAIN PRSNT: CPT | Performed by: NURSE PRACTITIONER

## 2024-10-04 PROCEDURE — 1160F RVW MEDS BY RX/DR IN RCRD: CPT | Performed by: NURSE PRACTITIONER

## 2024-10-04 PROCEDURE — 99213 OFFICE O/P EST LOW 20 MIN: CPT | Performed by: NURSE PRACTITIONER

## 2024-10-04 PROCEDURE — 1159F MED LIST DOCD IN RCRD: CPT | Performed by: NURSE PRACTITIONER

## 2024-10-04 RX ORDER — HYDROXYZINE PAMOATE 25 MG/1
CAPSULE ORAL
COMMUNITY
Start: 2024-09-14

## 2024-10-04 RX ORDER — CLONAZEPAM 0.5 MG/1
TABLET ORAL
COMMUNITY
Start: 2024-08-15

## 2024-10-04 RX ORDER — PROPRANOLOL HCL 10 MG
TABLET ORAL
COMMUNITY
Start: 2024-08-15

## 2024-10-04 NOTE — PROGRESS NOTES
Chief Complaint  Headache (4-5 TIMES A WEEK. LAST MONTH ), Nausea, and Fatigue     Subjective:      History of Present Illness {CC  Problem List  Visit  Diagnosis   Encounters  Notes  Medications  Labs  Result Review Imaging  Media :23}     Wilber Hopkins presents to [facility] for:      Headache  Headache pattern:  Headache sometimes there, sometimes not at all  Initial event:  Stressful life event  Other event details:  Grandmother that lived with him: aneurysm and then stroke  Recent changes:  Headaches come more often than they used to  Frequency:  2 to 3 per week  Providers seen:  Psychiatrist and gastroenterologist  Quality:  Dull  Laterality:  Both sides at the same time  Location:  [place]/sides  Aggravating factors:  Stress  Denies CP, SOA, change in vision, limb weakness.   Prior treatment:  APAP, IBU not effective     Grandmother: aneurysm in hospital for one month and now stroke.   He is being asked to do all cleaning around the house and take care of grandfather. Family got on to him for not doing something and brought on HA. He doesn't know why they can distribute chores.   Family here from [place].     States stress at home:  Longer he is at home: symptoms worsen    Psych: last visit September       Anxiety/ depression:   Chronic    Wellbutrin: once in am, started early this year.   Not eating breakfast     Atarax: takes one every am routinely     Inderal is as needed for rapid heart rate per psych.     GERD: started dexilant earlier in the year and states this one works the best.  Takes one a night.           I have reviewed patient's medical history, any new submitted information provided by patient or medical assistant and updated medical record.      Objective:      Physical Exam  Eyes:      Conjunctiva/sclera: Conjunctivae normal.      Pupils: Pupils are equal, round, and reactive to light.   Cardiovascular:      Rate and Rhythm: Normal rate and  "regular rhythm.      Pulses: Normal pulses.      Heart sounds: Normal heart sounds.   Musculoskeletal:        Arms:    Neurological:      General: No focal deficit present.      Mental Status: He is oriented to person, place, and time.      Sensory: No sensory deficit.      Motor: No weakness.   Psychiatric:         Mood and Affect: Affect is flat.         Speech: Speech normal.         Behavior: Behavior is cooperative.         Thought Content: Thought content normal.        Result Review  Data Reviewed:{ Labs  Result Review  Imaging  Med Tab  Media :23}                Vital Signs:   /72 (BP Location: Left arm, Patient Position: Sitting, Cuff Size: Adult)   Pulse 93   Ht 180.3 cm (71\")   Wt 66.9 kg (147 lb 8 oz)   SpO2 99%   BMI 20.57 kg/m²   Estimated body mass index is 20.57 kg/m² as calculated from the following:    Height as of this encounter: 180.3 cm (71\").    Weight as of this encounter: 66.9 kg (147 lb 8 oz).        Requested Prescriptions     Signed Prescriptions Disp Refills    tiZANidine (ZANAFLEX) 4 MG tablet 60 tablet 1     Sig: Take 1 tablet by mouth Every 8 (Eight) Hours As Needed (tension headache).       Routine medications provided by this office will also be refilled via pharmacy request.       Current Outpatient Medications:     buPROPion XL (WELLBUTRIN XL) 150 MG 24 hr tablet, Take 1 tablet by mouth Daily., Disp: , Rfl:     clonazePAM (KlonoPIN) 0.5 MG tablet, TAKE 1/2 TO 1 (ONE-HALF TO ONE) TABLET BY MOUTH ONCE DAILY AS NEEDED FOR SEVERE ANXIETY/PANIC, Disp: , Rfl:     dexlansoprazole (DEXILANT) 60 MG capsule, Take 1 capsule by mouth Daily., Disp: 90 capsule, Rfl: 1    hydrOXYzine pamoate (VISTARIL) 25 MG capsule, TAKE 1 TO 2 CAPSULES BY MOUTH ONCE DAILY AS NEEDED FOR ANXIETY, Disp: , Rfl:     hyoscyamine (Levbid) 0.375 MG 12 hr tablet, Take 1 tablet by mouth Every 12 (Twelve) Hours As Needed for Cramping., Disp: 60 tablet, Rfl: 11    Multiple Vitamin (MULTIVITAMIN PO), Take  by " mouth., Disp: , Rfl:     propranolol (INDERAL) 10 MG tablet, TAKE 1 TABLET BY MOUTH UP TO TWICE DAILY AS NEEDED FOR ANXIETY/HEART RACING, Disp: , Rfl:     tiZANidine (ZANAFLEX) 4 MG tablet, Take 1 tablet by mouth Every 8 (Eight) Hours As Needed (tension headache)., Disp: 60 tablet, Rfl: 1     Assessment and Plan:      Assessment and Plan {CC Problem List  Visit Diagnosis  ROS  Review (Popup)  Health Maintenance  Quality  BestPractice  Medications  SmartSets  SnapShot Encounters  Media :23}     Diagnoses and all orders for this visit:    1. Acute non intractable tension-type headache (Primary)  -     tiZANidine (ZANAFLEX) 4 MG tablet; Take 1 tablet by mouth Every 8 (Eight) Hours As Needed (tension headache).  Dispense: 60 tablet; Refill: 1      Symptoms consistent with tension HA due to stressors in house at the time.     Will start zanaflex.     I'm also concerned some side effects is due to his taking wellbutrin on empty stomach every am with atarax.   Advised him to follow up with          New Medications Ordered This Visit   Medications    tiZANidine (ZANAFLEX) 4 MG tablet     Sig: Take 1 tablet by mouth Every 8 (Eight) Hours As Needed (tension headache).     Dispense:  60 tablet     Refill:  1           Follow Up {Instructions Charge Capture  Follow-up Communications :23}     Return if symptoms worsen or fail to improve.      Patient was given instructions and counseling regarding his condition or for health maintenance advice. Please see specific information pulled into the AVS if appropriate.    Dragon disclaimer:   Much of this encounter note is an electronic transcription/translation of spoken language to printed text. The electronic translation of spoken language may permit erroneous, or at times, nonsensical words or phrases to be inadvertently transcribed; Although I have reviewed the note for such errors, some may still exist.     Additional Patient Counseling:       There are no Patient  Instructions on file for this visit.

## 2024-12-04 ENCOUNTER — OFFICE VISIT (OUTPATIENT)
Dept: GASTROENTEROLOGY | Facility: CLINIC | Age: 24
End: 2024-12-04
Payer: COMMERCIAL

## 2024-12-04 VITALS
SYSTOLIC BLOOD PRESSURE: 123 MMHG | DIASTOLIC BLOOD PRESSURE: 77 MMHG | WEIGHT: 149.6 LBS | TEMPERATURE: 97.7 F | HEART RATE: 64 BPM | HEIGHT: 61 IN | BODY MASS INDEX: 28.25 KG/M2

## 2024-12-04 DIAGNOSIS — R93.5 ABNORMAL CT OF THE ABDOMEN: Primary | ICD-10-CM

## 2024-12-04 DIAGNOSIS — R10.30 LOWER ABDOMINAL PAIN: ICD-10-CM

## 2024-12-04 DIAGNOSIS — K59.00 CONSTIPATION, UNSPECIFIED CONSTIPATION TYPE: ICD-10-CM

## 2024-12-04 DIAGNOSIS — K21.9 GASTROESOPHAGEAL REFLUX DISEASE, UNSPECIFIED WHETHER ESOPHAGITIS PRESENT: ICD-10-CM

## 2024-12-04 PROCEDURE — 1159F MED LIST DOCD IN RCRD: CPT | Performed by: INTERNAL MEDICINE

## 2024-12-04 PROCEDURE — 99214 OFFICE O/P EST MOD 30 MIN: CPT | Performed by: INTERNAL MEDICINE

## 2024-12-04 PROCEDURE — 1160F RVW MEDS BY RX/DR IN RCRD: CPT | Performed by: INTERNAL MEDICINE

## 2024-12-04 NOTE — PROGRESS NOTES
"Chief Complaint   Patient presents with    Abdominal Pain       History of Present Illness:   24 y.o. male who I last saw in the office in 7/24. My assessment and plan then was:  Assessment:  Constipation  Abdominal discomfort associated with constipation  GERD - on Dexilant which works well        Recommendations:  Go see your Urologist about the thickened urinary bladder wall seen on the CT abd/pelvis 8/23.   Increase the fiber to Fibercon 2 BID and drink more water.  F/u 6 mos.        He last had an EGD and colonoscopy (colon biopsies were normal except cecal biopsies showed \"Benign colonic mucosa with surface alteration and lymphocytic cryptitis. \" The Pathologist thought that this could be from a resolving infection or early microscopic colitis?) in 8/23.        GERD is doing well on Dexilant 60 mg/HS.        No constipation on Fibercon.        He still has lowerr abd pain when he has urgency to have a BM and during BM. The discomfort is gone after BM. He averages 2 BM/day.         No nausea or vomiting. No rectal bleeding or melena. Weight stable.        Nonsmoker. No ETOH.     Past Medical History:   Diagnosis Date    Anxiety     Depression     Gastritis     GERD (gastroesophageal reflux disease)     IBS (irritable bowel syndrome)     Nausea and vomiting 07/08/2020    OCD (obsessive compulsive disorder)     Panic attack     Proctitis     PTSD (post-traumatic stress disorder)        Past Surgical History:   Procedure Laterality Date    COLONOSCOPY N/A 07/18/2020    Procedure: COLONOSCOPY to cecum and t.i. with biopsies;  Surgeon: Tong Aly MD;  Location: [facility] ENDOSCOPY;  Service: Gastroenterology;  Laterality: N/A;  pre-  abd pain, n/v, diarrhea  post- normal    COLONOSCOPY N/A 8/14/2023    Procedure: COLONOSCOPY to cecum and ti;  Surgeon: Tong Aly MD;  Location: [facility] ENDOSCOPY;  Service: Gastroenterology;  Laterality: N/A;  PRE: abn imaging, constipation/diarrhea  POST: hemorrhoids    ENDOSCOPY " N/A 07/18/2020    Procedure: ESOPHAGOGASTRODUODENOSCOPY with biopsies;  Surgeon: Tong Aly MD;  Location: [facility] ENDOSCOPY;  Service: Gastroenterology;  Laterality: N/A;  pre- abd pain, n/v, diarrhea  post- gastritis    ENDOSCOPY N/A 8/14/2023    Procedure: ESOPHAGOGASTRODUODENOSCOPY with cold bxs;  Surgeon: Tong Aly MD;  Location: [facility] ENDOSCOPY;  Service: Gastroenterology;  Laterality: N/A;  PRE: dyspepsia, abn imaging  POST: gastritis         Current Outpatient Medications:     buPROPion XL (WELLBUTRIN XL) 150 MG 24 hr tablet, Take 1 tablet by mouth Daily., Disp: , Rfl:     clonazePAM (KlonoPIN) 0.5 MG tablet, TAKE 1/2 TO 1 (ONE-HALF TO ONE) TABLET BY MOUTH ONCE DAILY AS NEEDED FOR SEVERE ANXIETY/PANIC, Disp: , Rfl:     dexlansoprazole (DEXILANT) 60 MG capsule, Take 1 capsule by mouth Daily., Disp: 90 capsule, Rfl: 1    hydrOXYzine pamoate (VISTARIL) 25 MG capsule, TAKE 1 TO 2 CAPSULES BY MOUTH ONCE DAILY AS NEEDED FOR ANXIETY, Disp: , Rfl:     hyoscyamine (Levbid) 0.375 MG 12 hr tablet, Take 1 tablet by mouth Every 12 (Twelve) Hours As Needed for Cramping., Disp: 60 tablet, Rfl: 11    Multiple Vitamin (MULTIVITAMIN PO), Take  by mouth., Disp: , Rfl:     tiZANidine (ZANAFLEX) 4 MG tablet, Take 1 tablet by mouth Every 8 (Eight) Hours As Needed (tension headache)., Disp: 60 tablet, Rfl: 1    Allergies   Allergen Reactions    Amoxicillin GI Intolerance    Penicillins GI Intolerance       Family History   Problem Relation Age of Onset    Anxiety disorder Mother     Mental illness Father         Brain Damage causing mental issues    Prostate cancer Neg Hx     Colon cancer Neg Hx     Breast cancer Neg Hx        Social History     Socioeconomic History    Marital status: Single   Tobacco Use    Smoking status: Never    Smokeless tobacco: Never   Vaping Use    Vaping status: Never Used   Substance and Sexual Activity    Alcohol use: No    Drug use: No    Sexual activity: Defer       Review of Systems  "  Gastrointestinal:  Positive for abdominal pain.   All other systems reviewed and are negative.    Pertinent positives and negatives documented in the HPI and all other systems reviewed and were found to be negative.  /77   Pulse 64   Temp 97.7 °F (36.5 °C) (Temporal)   Ht 155.4 cm (61.2\")   Wt 67.9 kg (149 lb 9.6 oz)   BMI 28.08 kg/m²       Physical Exam  Vitals reviewed.   Constitutional:       General: He is not in acute distress.     Appearance: Normal appearance. He is well-developed. He is not diaphoretic.   HENT:      Head: Normocephalic and atraumatic. Hair is normal.      Right Ear: Hearing, tympanic membrane, ear canal and external ear normal.      Left Ear: Hearing, tympanic membrane, ear canal and external ear normal.      Nose: Nose normal. No nasal deformity.      Mouth/Throat:      Mouth: Mucous membranes are moist. No oral lesions.      Pharynx: Uvula midline. No uvula swelling.   Eyes:      General: Lids are normal. No scleral icterus.        Right eye: No discharge.         Left eye: No discharge.      Extraocular Movements: Extraocular movements intact.      Right eye: Normal extraocular motion and no nystagmus.      Left eye: Normal extraocular motion and no nystagmus.      Conjunctiva/sclera: Conjunctivae normal.      Pupils: Pupils are equal, round, and reactive to light.   Neck:      Thyroid: No thyromegaly.      Vascular: No JVD.   Cardiovascular:      Rate and Rhythm: Normal rate and regular rhythm.      Pulses: Normal pulses.      Heart sounds: Normal heart sounds. No murmur heard.     No gallop.   Pulmonary:      Effort: Pulmonary effort is normal. No respiratory distress.      Breath sounds: Normal breath sounds. No wheezing or rales.   Chest:      Chest wall: No tenderness.   Abdominal:      General: Bowel sounds are normal. There is no distension.      Palpations: Abdomen is soft. There is no mass.      Tenderness: There is no abdominal tenderness. There is no guarding.      " Hernia: No hernia is present.   Musculoskeletal:         General: No tenderness or deformity. Normal range of motion.      Cervical back: Normal range of motion and neck supple.   Lymphadenopathy:      Cervical: No cervical adenopathy.   Skin:     General: Skin is warm and dry.      Findings: No rash.   Neurological:      Mental Status: He is alert and oriented to person, place, and time.      Cranial Nerves: No cranial nerve deficit.      Motor: No abnormal muscle tone.      Coordination: Coordination normal.      Deep Tendon Reflexes: Reflexes are normal and symmetric. Reflexes normal.   Psychiatric:         Mood and Affect: Mood normal.         Behavior: Behavior normal.         Thought Content: Thought content normal.         Judgment: Judgment normal.         Diagnoses and all orders for this visit:    1. Abnormal CT of the abdomen (Primary)  -     CT Abdomen Pelvis With Contrast; Future    2. Lower abdominal pain  -     CT Abdomen Pelvis With Contrast; Future    3. Constipation, unspecified constipation type  -     CT Abdomen Pelvis With Contrast; Future    4. Gastroesophageal reflux disease, unspecified whether esophagitis present  -     CT Abdomen Pelvis With Contrast; Future      Assessment:  Constipation  Abdominal discomfort associated with constipation  GERD - on Dexilant which works well  Abnormal CT abd/pelvis in 8/23 showing proctitiis and a thickened urinary bladder.       Recommendations:  CT abd/pelvis  Try to get in to see a Urologist.  F/u 6 mos.       No follow-ups on file.    Tong Aly MD  12/4/2024

## 2024-12-19 ENCOUNTER — HOSPITAL ENCOUNTER (OUTPATIENT)
Dept: CT IMAGING | Facility: HOSPITAL | Age: 24
Discharge: HOME | End: 2024-12-19
Admitting: INTERNAL MEDICINE
Payer: COMMERCIAL

## 2024-12-19 DIAGNOSIS — K21.9 GASTROESOPHAGEAL REFLUX DISEASE, UNSPECIFIED WHETHER ESOPHAGITIS PRESENT: ICD-10-CM

## 2024-12-19 DIAGNOSIS — R10.30 LOWER ABDOMINAL PAIN: ICD-10-CM

## 2024-12-19 DIAGNOSIS — R93.5 ABNORMAL CT OF THE ABDOMEN: ICD-10-CM

## 2024-12-19 DIAGNOSIS — K59.00 CONSTIPATION, UNSPECIFIED CONSTIPATION TYPE: ICD-10-CM

## 2024-12-19 PROCEDURE — 74177 CT ABD & PELVIS W/CONTRAST: CPT

## 2024-12-19 PROCEDURE — 25510000001 IOPAMIDOL 61 % SOLUTION: Performed by: INTERNAL MEDICINE

## 2024-12-19 PROCEDURE — 25510000002 DIATRIZOATE MEGLUMINE & SODIUM PER 1 ML: Performed by: INTERNAL MEDICINE

## 2024-12-19 RX ORDER — DIATRIZOATE MEGLUMINE AND DIATRIZOATE SODIUM 660; 100 MG/ML; MG/ML
30 SOLUTION ORAL; RECTAL
Status: COMPLETED | OUTPATIENT
Start: 2024-12-19 | End: 2024-12-19

## 2024-12-19 RX ORDER — IOPAMIDOL 612 MG/ML
100 INJECTION, SOLUTION INTRAVASCULAR
Status: COMPLETED | OUTPATIENT
Start: 2024-12-19 | End: 2024-12-19

## 2024-12-19 RX ADMIN — DIATRIZOATE MEGLUMINE AND DIATRIZOATE SODIUM 30 ML: 660; 100 LIQUID ORAL; RECTAL at 16:30

## 2024-12-19 RX ADMIN — IOPAMIDOL 85 ML: 612 INJECTION, SOLUTION INTRAVENOUS at 17:36

## 2024-12-30 ENCOUNTER — TELEPHONE (OUTPATIENT)
Dept: GASTROENTEROLOGY | Facility: CLINIC | Age: 24
End: 2024-12-30
Payer: COMMERCIAL

## 2024-12-30 NOTE — TELEPHONE ENCOUNTER
----- Message from Tong Aly sent at 12/30/2024  2:05 PM EST -----  12/30/24        Tell him that his CAT scan of the abdomen and pelvis looks good.  Please send a copy of this report to his PCP.  Becca olea

## 2024-12-30 NOTE — TELEPHONE ENCOUNTER
Called pt and on identified vm advised of Dr Aly' s note. Verb understanding.     Results sent to pcp thru [facility].

## 2025-01-22 ENCOUNTER — TELEPHONE (OUTPATIENT)
Dept: INTERNAL MEDICINE | Facility: CLINIC | Age: 25
End: 2025-01-22
Payer: COMMERCIAL

## 2025-01-22 NOTE — TELEPHONE ENCOUNTER
Called patient to see if he planned to get overdue labs done phone number on file is out of service

## 2025-02-20 ENCOUNTER — TELEPHONE (OUTPATIENT)
Dept: INTERNAL MEDICINE | Facility: CLINIC | Age: 25
End: 2025-02-20
Payer: COMMERCIAL

## 2025-02-20 NOTE — TELEPHONE ENCOUNTER
RELAY     Attempted to call patient, Left Voice Mail to see if patient would like to complete labs. Patient can call the office to schedule a lab appointment. If patient would like to cancel labs please let us know.

## 2025-03-13 ENCOUNTER — TELEPHONE (OUTPATIENT)
Dept: INTERNAL MEDICINE | Facility: CLINIC | Age: 25
End: 2025-03-13
Payer: COMMERCIAL

## 2025-03-13 NOTE — TELEPHONE ENCOUNTER
Attempted to call patient.     Has overdue labs in chart, would patient like to complete the labs?  Patient can call [phone] to schedule a lab appointment

## 2025-03-24 ENCOUNTER — TELEPHONE (OUTPATIENT)
Dept: INTERNAL MEDICINE | Facility: CLINIC | Age: 25
End: 2025-03-24
Payer: COMMERCIAL

## 2025-03-24 NOTE — TELEPHONE ENCOUNTER
RELAY.     Patient has over due blood work in chart. Is patient wanting to complete ? If not let us know and we can cancel.

## 2025-04-10 ENCOUNTER — TELEPHONE (OUTPATIENT)
Dept: INTERNAL MEDICINE | Facility: CLINIC | Age: 25
End: 2025-04-10
Payer: COMMERCIAL

## 2025-04-10 NOTE — TELEPHONE ENCOUNTER
Request to cancel outstanding order:     [x] Lab   [] Imaging   [] Referral     Date order placed: July 26, 2024    Reason:   [] Duplicate  [] Patient declined and it is noted  [x] Patient has not scheduled (see below)   [] Other:     For patient that has not scheduled:     Patient was contacted 3 times:     1) Telephone Message on January 22, 2025  2) Telephone Message on February 20, 2025   3) Telephone Message on March 13, 2025      For patients seeing this message on [employer]: An attempt was made to contact you to schedule an order 3 times unsuccessfully.  If you feel this is in error, please contact your provider.

## 2025-06-02 ENCOUNTER — OFFICE VISIT (OUTPATIENT)
Dept: GASTROENTEROLOGY | Facility: CLINIC | Age: 25
End: 2025-06-02
Payer: COMMERCIAL

## 2025-06-02 ENCOUNTER — HOSPITAL ENCOUNTER (EMERGENCY)
Facility: HOSPITAL | Age: 25
Discharge: HOME | End: 2025-06-02
Attending: EMERGENCY MEDICINE | Admitting: EMERGENCY MEDICINE
Payer: COMMERCIAL

## 2025-06-02 VITALS
RESPIRATION RATE: 20 BRPM | SYSTOLIC BLOOD PRESSURE: 122 MMHG | HEART RATE: 90 BPM | TEMPERATURE: 97.7 F | DIASTOLIC BLOOD PRESSURE: 69 MMHG | OXYGEN SATURATION: 99 %

## 2025-06-02 VITALS
HEART RATE: 72 BPM | WEIGHT: 153 LBS | HEIGHT: 61 IN | TEMPERATURE: 97.3 F | BODY MASS INDEX: 28.89 KG/M2 | SYSTOLIC BLOOD PRESSURE: 125 MMHG | DIASTOLIC BLOOD PRESSURE: 77 MMHG

## 2025-06-02 DIAGNOSIS — R51.9 ACUTE NONINTRACTABLE HEADACHE, UNSPECIFIED HEADACHE TYPE: ICD-10-CM

## 2025-06-02 DIAGNOSIS — R11.2 NAUSEA AND VOMITING, UNSPECIFIED VOMITING TYPE: ICD-10-CM

## 2025-06-02 DIAGNOSIS — R55 SYNCOPE AND COLLAPSE: Primary | ICD-10-CM

## 2025-06-02 DIAGNOSIS — K58.1 IRRITABLE BOWEL SYNDROME WITH CONSTIPATION: ICD-10-CM

## 2025-06-02 DIAGNOSIS — K21.9 GASTROESOPHAGEAL REFLUX DISEASE, UNSPECIFIED WHETHER ESOPHAGITIS PRESENT: Primary | ICD-10-CM

## 2025-06-02 LAB
ALBUMIN SERPL-MCNC: 4.7 G/DL (ref 3.5–5.2)
ALBUMIN/GLOB SERPL: 1.9 G/DL
ALP SERPL-CCNC: 67 U/L (ref 39–117)
ALT SERPL W P-5'-P-CCNC: 18 U/L (ref 1–41)
ANION GAP SERPL CALCULATED.3IONS-SCNC: 15 MMOL/L (ref 5–15)
AST SERPL-CCNC: 22 U/L (ref 1–40)
BASOPHILS # BLD AUTO: 0.03 10*3/MM3 (ref 0–0.2)
BASOPHILS NFR BLD AUTO: 0.3 % (ref 0–1.5)
BILIRUB SERPL-MCNC: 0.4 MG/DL (ref 0–1.2)
BUN SERPL-MCNC: 18 MG/DL (ref 6–20)
BUN/CREAT SERPL: 15.4 (ref 7–25)
CALCIUM SPEC-SCNC: 10.2 MG/DL (ref 8.6–10.5)
CHLORIDE SERPL-SCNC: 105 MMOL/L (ref 98–107)
CO2 SERPL-SCNC: 19 MMOL/L (ref 22–29)
CREAT SERPL-MCNC: 1.17 MG/DL (ref 0.76–1.27)
DEPRECATED RDW RBC AUTO: 46.2 FL (ref 37–54)
EGFRCR SERPLBLD CKD-EPI 2021: 89.3 ML/MIN/1.73
EOSINOPHIL # BLD AUTO: 0.01 10*3/MM3 (ref 0–0.4)
EOSINOPHIL NFR BLD AUTO: 0.1 % (ref 0.3–6.2)
ERYTHROCYTE [DISTWIDTH] IN BLOOD BY AUTOMATED COUNT: 15.7 % (ref 12.3–15.4)
GLOBULIN UR ELPH-MCNC: 2.5 GM/DL
GLUCOSE SERPL-MCNC: 98 MG/DL (ref 65–99)
HCT VFR BLD AUTO: 40.4 % (ref 37.5–51)
HGB BLD-MCNC: 13.4 G/DL (ref 13–17.7)
HOLD SPECIMEN: NORMAL
HOLD SPECIMEN: NORMAL
IMM GRANULOCYTES # BLD AUTO: 0.05 10*3/MM3 (ref 0–0.05)
IMM GRANULOCYTES NFR BLD AUTO: 0.5 % (ref 0–0.5)
LYMPHOCYTES # BLD AUTO: 0.83 10*3/MM3 (ref 0.7–3.1)
LYMPHOCYTES NFR BLD AUTO: 7.5 % (ref 19.6–45.3)
MAGNESIUM SERPL-MCNC: 2.9 MG/DL (ref 1.6–2.6)
MCH RBC QN AUTO: 27.1 PG (ref 26.6–33)
MCHC RBC AUTO-ENTMCNC: 33.2 G/DL (ref 31.5–35.7)
MCV RBC AUTO: 81.8 FL (ref 79–97)
MONOCYTES # BLD AUTO: 0.67 10*3/MM3 (ref 0.1–0.9)
MONOCYTES NFR BLD AUTO: 6.1 % (ref 5–12)
NEUTROPHILS NFR BLD AUTO: 85.5 % (ref 42.7–76)
NEUTROPHILS NFR BLD AUTO: 9.42 10*3/MM3 (ref 1.7–7)
NRBC BLD AUTO-RTO: 0 /100 WBC (ref 0–0.2)
PLATELET # BLD AUTO: 193 10*3/MM3 (ref 140–450)
PMV BLD AUTO: 11.9 FL (ref 6–12)
POTASSIUM SERPL-SCNC: 3.4 MMOL/L (ref 3.5–5.2)
PROT SERPL-MCNC: 7.2 G/DL (ref 6–8.5)
QT INTERVAL: 349 MS
QTC INTERVAL: 421 MS
RBC # BLD AUTO: 4.94 10*6/MM3 (ref 4.14–5.8)
SODIUM SERPL-SCNC: 139 MMOL/L (ref 136–145)
WBC NRBC COR # BLD AUTO: 11.01 10*3/MM3 (ref 3.4–10.8)
WHOLE BLOOD HOLD COAG: NORMAL
WHOLE BLOOD HOLD SPECIMEN: NORMAL

## 2025-06-02 PROCEDURE — 96375 TX/PRO/DX INJ NEW DRUG ADDON: CPT

## 2025-06-02 PROCEDURE — 25810000003 SODIUM CHLORIDE 0.9 % SOLUTION: Performed by: EMERGENCY MEDICINE

## 2025-06-02 PROCEDURE — 83735 ASSAY OF MAGNESIUM: CPT | Performed by: EMERGENCY MEDICINE

## 2025-06-02 PROCEDURE — 96374 THER/PROPH/DIAG INJ IV PUSH: CPT

## 2025-06-02 PROCEDURE — 99283 EMERGENCY DEPT VISIT LOW MDM: CPT

## 2025-06-02 PROCEDURE — 36415 COLL VENOUS BLD VENIPUNCTURE: CPT

## 2025-06-02 PROCEDURE — 93005 ELECTROCARDIOGRAM TRACING: CPT | Performed by: EMERGENCY MEDICINE

## 2025-06-02 PROCEDURE — 85025 COMPLETE CBC W/AUTO DIFF WBC: CPT | Performed by: EMERGENCY MEDICINE

## 2025-06-02 PROCEDURE — 25010000002 KETOROLAC TROMETHAMINE PER 15 MG: Performed by: EMERGENCY MEDICINE

## 2025-06-02 PROCEDURE — 25010000002 ONDANSETRON PER 1 MG: Performed by: EMERGENCY MEDICINE

## 2025-06-02 PROCEDURE — 99214 OFFICE O/P EST MOD 30 MIN: CPT | Performed by: NURSE PRACTITIONER

## 2025-06-02 PROCEDURE — 80053 COMPREHEN METABOLIC PANEL: CPT | Performed by: EMERGENCY MEDICINE

## 2025-06-02 RX ORDER — KETOROLAC TROMETHAMINE 15 MG/ML
15 INJECTION, SOLUTION INTRAMUSCULAR; INTRAVENOUS ONCE
Status: COMPLETED | OUTPATIENT
Start: 2025-06-02 | End: 2025-06-02

## 2025-06-02 RX ORDER — ONDANSETRON 2 MG/ML
4 INJECTION INTRAMUSCULAR; INTRAVENOUS ONCE
Status: COMPLETED | OUTPATIENT
Start: 2025-06-02 | End: 2025-06-02

## 2025-06-02 RX ORDER — SODIUM CHLORIDE 0.9 % (FLUSH) 0.9 %
10 SYRINGE (ML) INJECTION AS NEEDED
Status: DISCONTINUED | OUTPATIENT
Start: 2025-06-02 | End: 2025-06-02 | Stop reason: HOSPADM

## 2025-06-02 RX ADMIN — SODIUM CHLORIDE 1000 ML: 9 INJECTION, SOLUTION INTRAVENOUS at 17:17

## 2025-06-02 RX ADMIN — ONDANSETRON 4 MG: 2 INJECTION, SOLUTION INTRAMUSCULAR; INTRAVENOUS at 17:17

## 2025-06-02 RX ADMIN — KETOROLAC TROMETHAMINE 15 MG: 15 INJECTION, SOLUTION INTRAMUSCULAR; INTRAVENOUS at 17:17
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
## 2025-06-02 NOTE — PROGRESS NOTES
Chief Complaint   Patient presents with    Heartburn       HPI    Wilber Hopkins is a  25 y.o. male here for a follow up visit for GERD and constipation.    This is a former patient of Dr. Aly's that is new to me today.    Past medical history of PTSD, OCD, panic attacks, anxiety/depression along with GERD and irritable bowel syndrome.    On visit today he reports doing much improved from a GI standpoint.  He has markedly decreased the amount of carbonated beverages he drinks each day.  Denies abdominal pain, nausea, vomiting, dysphagia or odynophagia.  He takes Dexilant as needed on rare occasion when heartburn is an issue.  His appetite has been good.  His weight has been stable.  He takes fiber supplement daily along with high-fiber diet and is without lower GI complaints.  He uses Levsin as needed for abdominal cramps with excellent results.    Endoscopic history reviewed:    UPPER GI ENDOSCOPY (08/14/2023 14:37) - No gross lesions in the esophagus.  Erythematous mucosa in the stomach.  Normal duodenum.  Pathology was benign.    COLONOSCOPY (08/14/2023 14:36) - Hemorrhoids otherwise normal.  Pathology was benign.    Past Medical History:   Diagnosis Date    Anxiety     Depression     Gastritis     GERD (gastroesophageal reflux disease)     IBS (irritable bowel syndrome)     Nausea and vomiting 07/08/2020    OCD (obsessive compulsive disorder)     Panic attack     Proctitis     PTSD (post-traumatic stress disorder)        Past Surgical History:   Procedure Laterality Date    COLONOSCOPY N/A 07/18/2020    Procedure: COLONOSCOPY to cecum and t.i. with biopsies;  Surgeon: Tong Aly MD;  Location: [facility] ENDOSCOPY;  Service: Gastroenterology;  Laterality: N/A;  pre-  abd pain, n/v, diarrhea  post- normal    COLONOSCOPY N/A 08/14/2023    Procedure: COLONOSCOPY to cecum and ti;  Surgeon: Tong Aly MD;  Location: [facility] ENDOSCOPY;  Service: Gastroenterology;  Laterality: N/A;  PRE: abn imaging,  constipation/diarrhea  POST: hemorrhoids    ENDOSCOPY N/A 07/18/2020    Procedure: ESOPHAGOGASTRODUODENOSCOPY with biopsies;  Surgeon: Tong Aly MD;  Location: [facility] ENDOSCOPY;  Service: Gastroenterology;  Laterality: N/A;  pre- abd pain, n/v, diarrhea  post- gastritis    ENDOSCOPY N/A 08/14/2023    Procedure: ESOPHAGOGASTRODUODENOSCOPY with cold bxs;  Surgeon: Tong Aly MD;  Location: [facility] ENDOSCOPY;  Service: Gastroenterology;  Laterality: N/A;  PRE: dyspepsia, abn imaging  POST: gastritis       Scheduled Meds:     Continuous Infusions: No current facility-administered medications for this visit.      PRN Meds:     Allergies   Allergen Reactions    Amoxicillin GI Intolerance    Penicillins GI Intolerance       Social History     Socioeconomic History    Marital status: Single   Tobacco Use    Smoking status: Never    Smokeless tobacco: Never   Vaping Use    Vaping status: Never Used   Substance and Sexual Activity    Alcohol use: No    Drug use: No    Sexual activity: Defer       Family History   Problem Relation Age of Onset    Anxiety disorder Mother     Mental illness Father         Brain Damage causing mental issues    Prostate cancer Neg Hx     Colon cancer Neg Hx     Breast cancer Neg Hx      Vitals:    06/02/25 0827   BP: 125/77   Pulse: 72   Temp: 97.3 °F (36.3 °C)       Physical Exam  Constitutional:       Appearance: He is well-developed.   Abdominal:      General: Bowel sounds are normal. There is no distension.      Palpations: Abdomen is soft. There is no mass.      Tenderness: There is no abdominal tenderness. There is no guarding.      Hernia: No hernia is present.   Skin:     General: Skin is warm and dry.      Capillary Refill: Capillary refill takes less than 2 seconds.   Neurological:      Mental Status: He is alert and oriented to person, place, and time.   Psychiatric:         Behavior: Behavior normal.     Assessment    Diagnoses and all orders for this visit:    1.  Gastroesophageal reflux disease, unspecified whether esophagitis present (Primary)    2. Irritable bowel syndrome with constipation    Plan    Continue antireflux dietary measures in combination with high-fiber diet and high-fiber supplement  Continue as needed Dexilant and as needed Levbid  RTC 1 year or sooner if needed         LOLIS Olivas  [facility]  [address] [place] [address]  Office: [phone]    I spent 30 minutes caring for Wilber on this date of service. This time includes time spent by me in the following activities: preparing for the visit, reviewing tests, obtaining and/or reviewing a separately obtained history, performing a medically appropriate examination and/or evaluation , counseling and educating the patient/family/caregiver, ordering medications, tests, or procedures, documenting information in the medical record, independently interpreting results and communicating that information with the patient/family/caregiver and care coordination.

## 2025-06-02 NOTE — ED PROVIDER NOTES
EMERGENCY DEPARTMENT ENCOUNTER    Room Number:  HE2/J  PCP: Charito Avila III [id number]  Historian: Patient      HPI:  Chief Complaint: Syncope  A complete HPI/ROS/PMH/PSH/SH/FH are unobtainable due to: None  Context: Wilber Hopkins is a 25 y.o. male who presents to the ED c/o sudden onset of a syncopal episode that occurred today just prior to ED arrival.  He states that he was outside when he began developing headache followed by abdominal discomfort as well as nausea and vomiting.  He states that he has had the discomfort and nausea/vomiting previously with anxiety events but does not think he is ever passed out before.  Currently, he states that he has headache was previously at least moderate intensity and now quite a bit better.  He denies chest pain, back pain, fever/chills, extremity pain, or known sick contacts.            PAST MEDICAL HISTORY  Active Ambulatory Problems     Diagnosis Date Noted    Diarrhea 07/08/2020    Constipation 07/08/2020    Nausea and vomiting 07/08/2020    Gastroesophageal reflux disease 07/08/2020    Paresthesia 03/31/2022    Acute gastritis 07/03/2020    Other specified anxiety disorders 07/03/2020    Irritable bowel syndrome with diarrhea 01/24/2023    Abnormal CT of the abdomen 07/17/2023     Resolved Ambulatory Problems     Diagnosis Date Noted    Pain of upper abdomen 07/08/2020     Past Medical History:   Diagnosis Date    Anxiety     Depression     Gastritis     GERD (gastroesophageal reflux disease)     IBS (irritable bowel syndrome)     OCD (obsessive compulsive disorder)     Panic attack     Proctitis     PTSD (post-traumatic stress disorder)          PAST SURGICAL HISTORY  Past Surgical History:   Procedure Laterality Date    COLONOSCOPY N/A 07/18/2020    Procedure: COLONOSCOPY to cecum and t.i. with biopsies;  Surgeon: Tong Aly MD;  Location: [facility] ENDOSCOPY;  Service: Gastroenterology;  Laterality: N/A;  pre-  abd pain, n/v, diarrhea  post- normal     COLONOSCOPY N/A 08/14/2023    Procedure: COLONOSCOPY to cecum and ti;  Surgeon: Tong Aly MD;  Location: [facility] ENDOSCOPY;  Service: Gastroenterology;  Laterality: N/A;  PRE: abn imaging, constipation/diarrhea  POST: hemorrhoids    ENDOSCOPY N/A 07/18/2020    Procedure: ESOPHAGOGASTRODUODENOSCOPY with biopsies;  Surgeon: Tong Aly MD;  Location: [facility] ENDOSCOPY;  Service: Gastroenterology;  Laterality: N/A;  pre- abd pain, n/v, diarrhea  post- gastritis    ENDOSCOPY N/A 08/14/2023    Procedure: ESOPHAGOGASTRODUODENOSCOPY with cold bxs;  Surgeon: Tong Aly MD;  Location: [facility] ENDOSCOPY;  Service: Gastroenterology;  Laterality: N/A;  PRE: dyspepsia, abn imaging  POST: gastritis         FAMILY HISTORY  Family History   Problem Relation Age of Onset    Anxiety disorder Mother     Mental illness Father         Brain Damage causing mental issues    Prostate cancer Neg Hx     Colon cancer Neg Hx     Breast cancer Neg Hx          SOCIAL HISTORY  Social History     Socioeconomic History    Marital status: Single   Tobacco Use    Smoking status: Never    Smokeless tobacco: Never   Vaping Use    Vaping status: Never Used   Substance and Sexual Activity    Alcohol use: No    Drug use: No    Sexual activity: Defer         ALLERGIES  Amoxicillin and Penicillins        REVIEW OF SYSTEMS  Review of Systems   Constitutional:  Negative for activity change, appetite change and fever.   HENT:  Negative for congestion and sore throat.    Eyes: Negative.    Respiratory:  Negative for cough and shortness of breath.    Cardiovascular:  Negative for chest pain and leg swelling.   Gastrointestinal:  Positive for abdominal pain, nausea and vomiting. Negative for diarrhea.   Endocrine: Negative.    Genitourinary:  Negative for decreased urine volume and dysuria.   Musculoskeletal:  Negative for neck pain.   Skin:  Negative for rash and wound.   Allergic/Immunologic: Negative.    Neurological:  Positive for syncope and headaches.  Negative for weakness and numbness.   Hematological: Negative.    Psychiatric/Behavioral: Negative.     All other systems reviewed and are negative.         PHYSICAL EXAM  ED Triage Vitals [06/02/25 1616]   Temp Heart Rate Resp BP SpO2   97.7 °F (36.5 °C) 110 20 119/74 100 %      Temp src Heart Rate Source Patient Position BP Location FiO2 (%)   Oral Monitor Sitting Right arm --       Physical Exam  Constitutional:       General: He is not in acute distress.     Appearance: Normal appearance. He is not ill-appearing or toxic-appearing.   HENT:      Head: Normocephalic and atraumatic.   Eyes:      Extraocular Movements: Extraocular movements intact.      Pupils: Pupils are equal, round, and reactive to light.   Cardiovascular:      Rate and Rhythm: Normal rate and regular rhythm.      Heart sounds: No murmur heard.     No friction rub. No gallop.   Pulmonary:      Effort: Pulmonary effort is normal.      Breath sounds: Normal breath sounds.   Abdominal:      General: Abdomen is flat. There is no distension.      Palpations: Abdomen is soft.      Tenderness: There is no abdominal tenderness.   Musculoskeletal:         General: No swelling or tenderness. Normal range of motion.      Cervical back: Normal range of motion and neck supple.   Skin:     General: Skin is warm and dry.   Neurological:      General: No focal deficit present.      Mental Status: He is alert and oriented to person, place, and time.      Sensory: No sensory deficit.      Motor: No weakness.   Psychiatric:         Mood and Affect: Mood normal.         Behavior: Behavior normal.           Vital signs and nursing notes reviewed.          LAB RESULTS  Recent Results (from the past 24 hours)   Comprehensive Metabolic Panel    Collection Time: 06/02/25  4:42 PM    Specimen: Blood   Result Value Ref Range    Glucose 98 65 - 99 mg/dL    BUN 18.0 6.0 - 20.0 mg/dL    Creatinine 1.17 0.76 - 1.27 mg/dL    Sodium 139 136 - 145 mmol/L    Potassium 3.4 (L) 3.5  - 5.2 mmol/L    Chloride 105 98 - 107 mmol/L    CO2 19.0 (L) 22.0 - 29.0 mmol/L    Calcium 10.2 8.6 - 10.5 mg/dL    Total Protein 7.2 6.0 - 8.5 g/dL    Albumin 4.7 3.5 - 5.2 g/dL    ALT (SGPT) 18 1 - 41 U/L    AST (SGOT) 22 1 - 40 U/L    Alkaline Phosphatase 67 39 - 117 U/L    Total Bilirubin 0.4 0.0 - 1.2 mg/dL    Globulin 2.5 gm/dL    A/G Ratio 1.9 g/dL    BUN/Creatinine Ratio 15.4 7.0 - 25.0    Anion Gap 15.0 5.0 - 15.0 mmol/L    eGFR 89.3 >60.0 mL/min/1.73   Magnesium    Collection Time: 06/02/25  4:42 PM    Specimen: Blood   Result Value Ref Range    Magnesium 2.9 (H) 1.6 - 2.6 mg/dL   Green Top (Gel)    Collection Time: 06/02/25  4:42 PM   Result Value Ref Range    Extra Tube Hold for add-ons.    Lavender Top    Collection Time: 06/02/25  4:42 PM   Result Value Ref Range    Extra Tube hold for add-on    Gold Top - SST    Collection Time: 06/02/25  4:42 PM   Result Value Ref Range    Extra Tube Hold for add-ons.    Light Blue Top    Collection Time: 06/02/25  4:42 PM   Result Value Ref Range    Extra Tube Hold for add-ons.    CBC Auto Differential    Collection Time: 06/02/25  4:42 PM    Specimen: Blood   Result Value Ref Range    WBC 11.01 (H) 3.40 - 10.80 10*3/mm3    RBC 4.94 4.14 - 5.80 10*6/mm3    Hemoglobin 13.4 13.0 - 17.7 g/dL    Hematocrit 40.4 37.5 - 51.0 %    MCV 81.8 79.0 - 97.0 fL    MCH 27.1 26.6 - 33.0 pg    MCHC 33.2 31.5 - 35.7 g/dL    RDW 15.7 (H) 12.3 - 15.4 %    RDW-SD 46.2 37.0 - 54.0 fl    MPV 11.9 6.0 - 12.0 fL    Platelets 193 140 - 450 10*3/mm3    Neutrophil % 85.5 (H) 42.7 - 76.0 %    Lymphocyte % 7.5 (L) 19.6 - 45.3 %    Monocyte % 6.1 5.0 - 12.0 %    Eosinophil % 0.1 (L) 0.3 - 6.2 %    Basophil % 0.3 0.0 - 1.5 %    Immature Grans % 0.5 0.0 - 0.5 %    Neutrophils, Absolute 9.42 (H) 1.70 - 7.00 10*3/mm3    Lymphocytes, Absolute 0.83 0.70 - 3.10 10*3/mm3    Monocytes, Absolute 0.67 0.10 - 0.90 10*3/mm3    Eosinophils, Absolute 0.01 0.00 - 0.40 10*3/mm3    Basophils, Absolute 0.03 0.00 -  0.20 10*3/mm3    Immature Grans, Absolute 0.05 0.00 - 0.05 10*3/mm3    nRBC 0.0 0.0 - 0.2 /100 WBC   ECG 12 Lead Syncope    Collection Time: 06/02/25  4:50 PM   Result Value Ref Range    QT Interval 349 ms    QTC Interval 421 ms       Ordered the above labs and reviewed the results.        RADIOLOGY  No Radiology Exams Resulted Within Past 24 Hours    Ordered the above noted radiological studies. Reviewed by me in PACS.            PROCEDURES  Procedures    EKG independently interpreted by myself as follows:    EKG          EKG time: 1650  Rhythm/Rate: NSR, 87  P waves and [place]: nml  QRS, axis: nml, nml   ST and T waves: nml     Interpreted Contemporaneously by me, independently viewed  unchanged compared to prior 8/15/24            MEDICATIONS GIVEN IN ER  Medications   sodium chloride 0.9 % flush 10 mL (has no administration in time range)   sodium chloride 0.9 % bolus 1,000 mL (0 mL Intravenous Stopped 6/2/25 1813)   ondansetron (ZOFRAN) injection 4 mg (4 mg Intravenous Given 6/2/25 1717)   ketorolac (TORADOL) injection 15 mg (15 mg Intravenous Given 6/2/25 1717)                   MEDICAL DECISION MAKING, PROGRESS, and CONSULTS    All labs have been independently reviewed by me.  All radiology studies have been reviewed by me and I have also reviewed the radiology report.   EKGs independently viewed and interpreted by me.  Discussion below represents my analysis of pertinent findings related to patient's condition, differential diagnosis, treatment plan and final disposition.      Additional sources:  - Discussed/ obtained information from independent historians: History obtained from the patient himself at bedside.    - External (non-ED) record review: Upon medical records review, the patient was last seen and evaluated in the outpatient office of gastroenterology earlier today, 6/2/2025, for evaluation of his known gastroesophageal reflux disease.    - Chronic or social conditions impacting care: Chronic  anxiety    - Shared decision making: Discharge decision based on shared conversations between myself as well as the patient and patient's family at bedside.      Orders placed during this visit:  Orders Placed This Encounter   Procedures    [place] Draw    Comprehensive Metabolic Panel    Magnesium    CBC Auto Differential    NPO Diet NPO Type: Strict NPO    Undress & Gown    Continuous Pulse Oximetry    Vital Signs    Orthostatic Blood Pressure    Oxygen Therapy- Nasal Cannula; Titrate 1-6 LPM Per SpO2; 90 - 95%    POC Glucose Once    ECG 12 Lead Syncope    Insert Peripheral IV    CBC & Differential    Green Top (Gel)    Lavender Top    Gold Top - SST    Light Blue Top           Differential diagnosis includes but is not limited to:    Vasovagal syncope, cardiogenic syncope, long QT syndrome, dehydration, acute anemia, or severe electrolyte disturbance      Independent interpretation of labs, radiology studies, and discussions with consultants:    Lab values independently interpreted by myself with my interpretation showing a normal CBC as well as CMP      ED Course as of 06/02/25 1843 Mon Jun 02, 2025 1842 On reevaluation, the patient reports marked improvement in symptoms today.  He states that his headache, nausea/vomiting, as well as abdominal discomfort have all significantly improved.  His symptoms could certainly be more explained by some anxiety as well as some potential dehydration.  I informed the patient and the family that the workup in the ED is unremarkable and he will be stable for discharge.  They agree with that plan and all questions answered. [BM]      ED Course User Index  [BM] Jake Zelaya MD           DIAGNOSIS  Final diagnoses:   Syncope and collapse   Acute nonintractable headache, unspecified headache type   Nausea and vomiting, unspecified vomiting type         DISPOSITION  DISCHARGE    Patient discharged in stable condition.    Reviewed implications of results, diagnosis,  meds, responsibility to follow up, warning signs and symptoms of possible worsening, potential complications and reasons to return to ER.    Patient/Family voiced understanding of above instructions.    Discussed plan for discharge, as there is no emergent indication for admission. Patient referred to primary care provider for BP management due to today's BP. Pt/family is agreeable and understands need for follow up and repeat testing.  Pt is aware that discharge does not mean that nothing is wrong but it indicates no emergency is present that requires admission and they must continue care with follow-up as given below or physician of their choice.     FOLLOW-UP  No follow-up provider specified.       Medication List      No changes were made to your prescriptions during this visit.                   Latest Documented Vital Signs:  As of 18:43 EDT  BP- 119/74 HR- 110 Temp- 97.7 °F (36.5 °C) (Oral) O2 sat- 100%              --    Please note that portions of this were completed with a voice recognition program.       Note Disclaimer: At [facility], we believe that sharing information builds trust and better relationships. You are receiving this note because you are receiving care at [facility] or recently visited. It is possible you will see health information before a provider has talked with you about it. This kind of information can be easy to misunderstand. To help you fully understand what it means for your health, we urge you to discuss this note with your provider.             Jake Zelaya MD  06/02/25 8879

## 2025-06-06 ENCOUNTER — OFFICE VISIT (OUTPATIENT)
Dept: INTERNAL MEDICINE | Facility: CLINIC | Age: 25
End: 2025-06-06
Payer: COMMERCIAL

## 2025-06-06 VITALS
WEIGHT: 153.6 LBS | SYSTOLIC BLOOD PRESSURE: 118 MMHG | HEIGHT: 61 IN | DIASTOLIC BLOOD PRESSURE: 62 MMHG | OXYGEN SATURATION: 100 % | HEART RATE: 66 BPM | BODY MASS INDEX: 29 KG/M2

## 2025-06-06 DIAGNOSIS — R56.9 SEIZURE: Primary | ICD-10-CM

## 2025-06-06 DIAGNOSIS — F41.8 OTHER SPECIFIED ANXIETY DISORDERS: Chronic | ICD-10-CM

## 2025-06-06 DIAGNOSIS — K58.0 IRRITABLE BOWEL SYNDROME WITH DIARRHEA: Chronic | ICD-10-CM

## 2025-06-06 LAB
BUN SERPL-MCNC: 18 MG/DL (ref 6–20)
BUN/CREAT SERPL: 11.5 (ref 7–25)
CALCIUM SERPL-MCNC: 9.8 MG/DL (ref 8.6–10.5)
CHLORIDE SERPL-SCNC: 106 MMOL/L (ref 98–107)
CO2 SERPL-SCNC: 28.6 MMOL/L (ref 22–29)
CREAT SERPL-MCNC: 1.57 MG/DL (ref 0.76–1.27)
EGFRCR SERPLBLD CKD-EPI 2021: 62.7 ML/MIN/1.73
ERYTHROCYTE [DISTWIDTH] IN BLOOD BY AUTOMATED COUNT: 15.6 % (ref 12.3–15.4)
GLUCOSE SERPL-MCNC: 97 MG/DL (ref 65–99)
HCT VFR BLD AUTO: 40 % (ref 37.5–51)
HGB BLD-MCNC: 12.7 G/DL (ref 13–17.7)
MCH RBC QN AUTO: 27.7 PG (ref 26.6–33)
MCHC RBC AUTO-ENTMCNC: 31.8 G/DL (ref 31.5–35.7)
MCV RBC AUTO: 87.1 FL (ref 79–97)
PLATELET # BLD AUTO: 156 10*3/MM3 (ref 140–450)
POTASSIUM SERPL-SCNC: 4.1 MMOL/L (ref 3.5–5.2)
RBC # BLD AUTO: 4.59 10*6/MM3 (ref 4.14–5.8)
SODIUM SERPL-SCNC: 145 MMOL/L (ref 136–145)
TSH SERPL DL<=0.005 MIU/L-ACNC: 2.49 UIU/ML (ref 0.27–4.2)
WBC # BLD AUTO: 4.55 10*3/MM3 (ref 3.4–10.8)

## 2025-06-06 PROCEDURE — 99214 OFFICE O/P EST MOD 30 MIN: CPT | Performed by: NURSE PRACTITIONER

## 2025-06-06 NOTE — PROGRESS NOTES
"        Chief Complaint  Hospital Follow Up Visit (ER follow up-06/02. ) and Fatigue (Started on Tuesday )     Subjective:      History of Present Illness {CC  Problem List  Visit  Diagnosis   Encounters  Notes  Medications  Labs  Result Review Imaging  Media :23}     Wilber Hopkins presents to [facility] for:      Hospital follow up   ER via EMS on 6/2/25: per records: \"pt was outside for a while having a water gun fight, came in and had several syncopal episodes. Pt reports nausea and vomiting since. Pt also has hx of anxiety attacks, used to come monthly but now he's been having one every other week. \"    In ER: EKG: NSR   Felt related to anxiety / dehydration     He had been seen by GI earlier that day for GERD/ IBS: advised to continue dexilant, levbid (states had not taken) and high fiber diet.     Note from witness: \"eyes rolled back in head, drooled, foggy, foaming at the mouth.  In and out for 30 mins\".  Another witness \"could not speak, seized for 5 mins\"    States father was dx with seizure disorder.     He had taken: wellbutrin.  He takes hydroxyzine once at night.   Last took klonopin Thursday of the prior week.   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   Continues to live with grandparents.             I have reviewed patient's medical history, any new submitted information provided by patient or medical assistant and updated medical record.      Objective:      Physical Exam  Vitals reviewed.   Constitutional:       Appearance: Normal appearance. He is well-developed.   Eyes:      Conjunctiva/sclera: Conjunctivae normal.   Neck:      Thyroid: No thyromegaly.   Cardiovascular:      Rate and Rhythm: Normal rate and regular rhythm.      Pulses: Normal pulses.      Heart sounds: Normal heart sounds.   Pulmonary:      Effort: Pulmonary effort is normal.      Breath sounds: Normal breath sounds. " "     Comments: E/U   Musculoskeletal:         General: Normal range of motion.   Lymphadenopathy:      Cervical: No cervical adenopathy.   Skin:     General: Skin is warm and dry.      Capillary Refill: Capillary refill takes 2 to 3 seconds.   Neurological:      General: No focal deficit present.      Mental Status: He is alert and oriented to person, place, and time.      Motor: No weakness.   Psychiatric:         Mood and Affect: Mood normal.         Behavior: Behavior normal. Behavior is cooperative.         Thought Content: Thought content normal.         Judgment: Judgment normal.        Result Review  Data Reviewed:{ Labs  Result Review  Imaging  Med Tab  Media :23}     The following data was reviewed by: Charito Avila III, NP-C on 06/06/2025  Common labs          7/24/2024    15:49 8/15/2024    14:01 6/2/2025    16:42   Common Labs   Glucose 97  106  98    BUN 15  21  18.0    Creatinine 0.96  0.90  1.17    Sodium 141  138  139    Potassium 4.3  3.8  3.4    Chloride 102  102  105    Calcium 9.4  9.8  10.2    Albumin 4.7  4.9  4.7    Total Bilirubin 0.2  0.5  0.4    Alkaline Phosphatase 86  83  67    AST (SGOT) 17  16  22    ALT (SGPT) 17  17  18    WBC 4.1  6.66  11.01    Hemoglobin 12.7  13.3  13.4    Hematocrit 41.1  41.3  40.4    Platelets 181  220  193    Hemoglobin A1C 5.8               Vital Signs:   /62 (BP Location: Left arm, Patient Position: Sitting, Cuff Size: Adult)   Pulse 66   Ht 154.9 cm (61\")   Wt 69.7 kg (153 lb 9.6 oz)   SpO2 100%   BMI 29.02 kg/m²   Estimated body mass index is 29.02 kg/m² as calculated from the following:    Height as of this encounter: 154.9 cm (61\").    Weight as of this encounter: 69.7 kg (153 lb 9.6 oz).        Requested Prescriptions      No prescriptions requested or ordered in this encounter       Routine medications provided by this office will also be refilled via pharmacy request.       Current Outpatient Medications:     buPROPion XL " (WELLBUTRIN XL) 150 MG 24 hr tablet, Take 1 tablet by mouth Daily., Disp: , Rfl:     clonazePAM (KlonoPIN) 0.5 MG tablet, TAKE 1/2 TO 1 (ONE-HALF TO ONE) TABLET BY MOUTH ONCE DAILY AS NEEDED FOR SEVERE ANXIETY/PANIC, Disp: , Rfl:     dexlansoprazole (DEXILANT) 60 MG capsule, Take 1 capsule by mouth Daily., Disp: 90 capsule, Rfl: 1    hydrOXYzine pamoate (VISTARIL) 25 MG capsule, TAKE 1 TO 2 CAPSULES BY MOUTH ONCE DAILY AS NEEDED FOR ANXIETY, Disp: , Rfl:     hyoscyamine (Levbid) 0.375 MG 12 hr tablet, Take 1 tablet by mouth Every 12 (Twelve) Hours As Needed for Cramping., Disp: 60 tablet, Rfl: 11    tiZANidine (ZANAFLEX) 4 MG tablet, Take 1 tablet by mouth Every 8 (Eight) Hours As Needed (tension headache)., Disp: 60 tablet, Rfl: 1     Assessment and Plan:      Assessment and Plan {CC Problem List  Visit Diagnosis  ROS  Review (Popup)  Health Maintenance  Quality  BestPractice  Medications  SmartSets  SnapShot Encounters  Media :23}     Diagnoses and all orders for this visit:    1. Questionable Seizure (Primary)  -     TSH Rfx On Abnormal To Free T4  -     Basic Metabolic Panel  -     CBC (No Diff)  -     Ambulatory Referral to Neurology    2. Irritable bowel syndrome with diarrhea  Assessment & Plan:  Follow up with GI        3. Other specified anxiety disorders  Assessment & Plan:  Psychological condition is stable.     Continues wellbutrin.          Advised good hydration when outside with residents.   He has not CV complaints - stays active with sports.   Follow up routinely with .          No orders of the defined types were placed in this encounter.            Follow Up {Instructions Charge Capture  Follow-up Communications :23}     Return if symptoms worsen or fail to improve, for Next scheduled follow up.      Patient was given instructions and counseling regarding his condition or for health maintenance advice. Please see specific information pulled into the AVS if appropriate.    Dragon  disclaimer:   Much of this encounter note is an electronic transcription/translation of spoken language to printed text. The electronic translation of spoken language may permit erroneous, or at times, nonsensical words or phrases to be inadvertently transcribed; Although I have reviewed the note for such errors, some may still exist.     Additional Patient Counseling:       There are no Patient Instructions on file for this visit.

## 2025-07-14 NOTE — DISCHARGE SUMMARY
ED OBSERVATION PROGRESS/DISCHARGE SUMMARY    Date of Admission: 3/31/2022   LOS: 0 days   PCP: Abdullahi Carballo MD    Final Diagnosis lower extremity numbness and weakness    Subjective    Patient reports that symptoms have resolved since his Ativan administration prior to his MRI.  He denies any headache or visual changes.  Denies chest pain and shortness of breath.  Denies fevers and chills.  He states that he has very severe separation anxiety and does not feel comfortable staying away from home.  He states that he does not follow with a psychologist or psychiatrist outpatient    Hospital Outcome:   22-year-old male with a history of GERD, anxiety, PTSD, and panic attacks who presents to [facility] ER with bilateral lower extremity numbness since last night.  Patient had a negative head CT and negative CT abdomen and pelvis on 3/30/2022.  ER evaluation included unremarkable laboratory work-up.  Bilateral lower extremity Dopplers were performed that were negative for findings of DVT.  Neurology was consulted in the ER, saw and evaluated the patient recommending an MRI of the C and T-spine with further laboratory work-up including TSH, B12, folate, ESR and CRP.  MRI of the C and T-spine were unremarkable and further laboratory evaluation were all within normal limits.  Spoke with neurology, after his work-up resulted negative and with patient's desire to go home [facility], who stated that they felt patient can be discharged at this time with outpatient follow-up with symptoms persist.  I discussed the results and plan with the patient who endorses understanding is in agreement.    ROS:  General: no fevers, chills  Respiratory: no cough, dyspnea  Cardiovascular: no chest pain, palpitations  Abdomen: No abdominal pain, nausea, vomiting, or diarrhea  Neurologic: No focal weakness    Objective   Physical Exam:  I have reviewed the vital signs.  Temp:  [96.8 °F (36 °C)-98.3 °F (36.8 °C)] 98.1 °F (36.7  °C)  Heart Rate:  [81-96] 81  Resp:  [16-18] 18  BP: (111-144)/(70-95) 124/82  General Appearance:  22-year-old male, well-nourished, no acute distress on room air  Head:    Normocephalic, atraumatic  Eyes:    Sclerae anicteric  Neck:   Supple, no mass  Lungs: Clear to auscultation bilaterally, respirations unlabored  Heart: Regular rate and rhythm, S1 and S2 normal, no murmur, rub or gallop  Abdomen:  Soft, non-tender, bowel sounds active, nondistended  Extremities: No clubbing, cyanosis, or edema to lower extremities  Pulses:  2+ and symmetric in distal lower extremities  Skin: No rashes   Neurologic: Oriented x3, Normal strength to extremities    Results Review:    I have reviewed the labs, radiology results and diagnostic studies.    Results from last 7 days   Lab Units 03/31/22  1506   WBC 10*3/mm3 9.38   HEMOGLOBIN g/dL 17.6   HEMATOCRIT % 48.2   PLATELETS 10*3/mm3 213     Results from last 7 days   Lab Units 03/31/22  1506 03/30/22  1613   SODIUM mmol/L 137 138   POTASSIUM mmol/L 3.9 4.0   CHLORIDE mmol/L 102 103   CO2 mmol/L 23.0 27.0   BUN mg/dL 14 15   CREATININE mg/dL 1.13 0.84   CALCIUM mg/dL 9.8 9.3   BILIRUBIN mg/dL  --  0.7   ALK PHOS U/L  --  97   ALT (SGPT) U/L  --  13   AST (SGOT) U/L  --  15   GLUCOSE mg/dL 91 88     Imaging Results (Last 24 Hours)     Procedure Component Value Units Date/Time    MRI Cervical Spine With & Without Contrast [[id number]] Collected: 03/31/22 1801     Updated: 03/31/22 1812    Narrative:      MRI CERVICAL SPINE W WO CONTRAST-, MRI THORACIC SPINE W WO CONTRAST-     CLINICAL HISTORY: 22-year-old male with lower extremity weakness and  numbness. Diminished reflexes and arms and legs.     TECHNIQUE: Sagittal T1, T2 and gradient echo images of the cervical  spine were obtained. Sagittal T1, T2 and proton-density-weighted images  of the thoracic spine were acquired. Sagittal and axial T1 and  T2-weighted images were also acquired through both areas, including  postcontrast  T1-weighted images.     COMPARISON: None     FINDINGS: There is normal alignment. All of the cervical and thoracic  vertebral bodies are normal in height. There is no significant disc  bulging or focal disc herniation or spinal canal or foraminal stenosis  at any level within the cervical spine or thoracic spine. The spinal  cord shows normal signal intensity. In particular, there is no evidence  of transverse myelitis. There is no abnormal enhancement within the  subarachnoid space or bone marrow.       Impression:      Normal MRI imaging of the cervical spine and thoracic spine  with and without contrast.     This report was finalized on 3/31/2022 6:09 PM by Dr. Darrick Branham M.D.       MRI Thoracic Spine With & Without Contrast [[id number]] Collected: 03/31/22 1801     Updated: 03/31/22 1812    Narrative:      MRI CERVICAL SPINE W WO CONTRAST-, MRI THORACIC SPINE W WO CONTRAST-     CLINICAL HISTORY: 22-year-old male with lower extremity weakness and  numbness. Diminished reflexes and arms and legs.     TECHNIQUE: Sagittal T1, T2 and gradient echo images of the cervical  spine were obtained. Sagittal T1, T2 and proton-density-weighted images  of the thoracic spine were acquired. Sagittal and axial T1 and  T2-weighted images were also acquired through both areas, including  postcontrast T1-weighted images.     COMPARISON: None     FINDINGS: There is normal alignment. All of the cervical and thoracic  vertebral bodies are normal in height. There is no significant disc  bulging or focal disc herniation or spinal canal or foraminal stenosis  at any level within the cervical spine or thoracic spine. The spinal  cord shows normal signal intensity. In particular, there is no evidence  of transverse myelitis. There is no abnormal enhancement within the  subarachnoid space or bone marrow.       Impression:      Normal MRI imaging of the cervical spine and thoracic spine  with and without contrast.     This report was  finalized on 3/31/2022 6:09 PM by Dr. Darrick Branham M.D.             I have reviewed the medications.  ---------------------------------------------------------------------------------------------  Assessment/Plan   Assessment/Problem List    Paresthesia      Plan:    Lower extremity numbness and weakness  -Patient reports continued resolution of symptoms since Ativan administration  -CT head 3/30/2022 negative  - MRI of cervical and thoracic normal  -Dr. Yeboah with neurology saw and evaluated the patient recommending work-up for William Barré including MRIs as well as further laboratory evaluation  -TSH, ESR, CRP, magnesium, B12 and folate levels all within normal limits  -Lateral lower extremity ultrasounds negative for DVT  -I spoke with Dr. Yeboah who states it is fine to discharge this patient from a neurology standpoint have him follow-up outpatient should symptoms return.    Anxiety/panic attacks/PTSD  -Continue hydroxyzine 25 mg as needed at home  -Patient had improvement of his symptoms after receiving 1 mg IV Ativan prior to his MRI, he asked if that was the medication that he could have at home for when he has panic attacks or increased anxiety,  I spoke further with him that with his severe anxiety and panic attacks he should would be best served to establish rapport with a psychiatrist and discharged him with information for a referral.    GERD  -Continue home medication     Disposition: Home    Follow-up after Discharge: Neurology and psychiatry as needed, PCP    This note will serve as a discharge summary.    I wore an face mask, eye protection, and gloves during this patient encounter. Patient also wearing a surgical mask. Hand hygeine performed before and after seeing the patient.      Sarah Constantino PA-C 03/31/22 21:58 EDT             Yes

## (undated) DEVICE — BITEBLOCK OMNI BLOC

## (undated) DEVICE — KT ORCA ORCAPOD DISP STRL

## (undated) DEVICE — ADAPT CLN BIOGUARD AIR/H2O DISP

## (undated) DEVICE — LN SMPL CO2 SHTRM SD STREAM W/M LUER

## (undated) DEVICE — SENSR O2 OXIMAX FNGR A/ 18IN NONSTR

## (undated) DEVICE — THE TORRENT IRRIGATION SCOPE CONNECTOR IS USED WITH THE TORRENT IRRIGATION TUBING TO PROVIDE IRRIGATION FLUIDS SUCH AS STERILE WATER DURING GASTROINTESTINAL ENDOSCOPIC PROCEDURES WHEN USED IN CONJUNCTION WITH AN IRRIGATION PUMP (OR ELECTROSURGICAL UNIT).: Brand: TORRENT

## (undated) DEVICE — TUBING, SUCTION, 1/4" X 10', STRAIGHT: Brand: MEDLINE

## (undated) DEVICE — SINGLE-USE BIOPSY FORCEPS: Brand: RADIAL JAW 4

## (undated) DEVICE — CANN O2 ETCO2 FITS ALL CONN CO2 SMPL A/ 7IN DISP LF